# Patient Record
Sex: FEMALE | Race: WHITE | NOT HISPANIC OR LATINO | Employment: FULL TIME | URBAN - METROPOLITAN AREA
[De-identification: names, ages, dates, MRNs, and addresses within clinical notes are randomized per-mention and may not be internally consistent; named-entity substitution may affect disease eponyms.]

---

## 2024-01-19 ENCOUNTER — TELEPHONE (OUTPATIENT)
Dept: NEUROSURGERY | Facility: CLINIC | Age: 54
End: 2024-01-19
Payer: COMMERCIAL

## 2024-01-25 ENCOUNTER — TELEPHONE (OUTPATIENT)
Dept: SPINE | Facility: CLINIC | Age: 54
End: 2024-01-25
Payer: COMMERCIAL

## 2024-01-26 ENCOUNTER — TELEPHONE (OUTPATIENT)
Dept: NEUROSURGERY | Facility: CLINIC | Age: 54
End: 2024-01-26
Payer: COMMERCIAL

## 2024-01-26 ENCOUNTER — PATIENT MESSAGE (OUTPATIENT)
Dept: NEUROSURGERY | Facility: CLINIC | Age: 54
End: 2024-01-26
Payer: COMMERCIAL

## 2024-01-26 DIAGNOSIS — M54.9 DORSALGIA: Primary | ICD-10-CM

## 2024-01-26 RX ORDER — ALPRAZOLAM 0.5 MG/1
0.5 TABLET ORAL 2 TIMES DAILY PRN
COMMUNITY

## 2024-01-26 RX ORDER — ESCITALOPRAM OXALATE 20 MG/1
20 TABLET ORAL DAILY
COMMUNITY

## 2024-01-26 RX ORDER — IBUPROFEN 800 MG/1
800 TABLET ORAL EVERY 8 HOURS
Status: ON HOLD | COMMUNITY
Start: 2023-11-01 | End: 2024-03-22 | Stop reason: HOSPADM

## 2024-01-26 RX ORDER — OXYCODONE HYDROCHLORIDE 5 MG/1
5 TABLET ORAL EVERY 4 HOURS PRN
Status: ON HOLD | COMMUNITY
Start: 2023-11-01 | End: 2024-03-22 | Stop reason: HOSPADM

## 2024-01-26 RX ORDER — AMLODIPINE BESYLATE 2.5 MG/1
TABLET ORAL
COMMUNITY
Start: 2024-01-08

## 2024-01-26 RX ORDER — NALOXONE HYDROCHLORIDE 4 MG/.1ML
4 SPRAY NASAL DAILY PRN
COMMUNITY
Start: 2023-08-15 | End: 2024-08-14

## 2024-01-26 RX ORDER — GABAPENTIN 400 MG/1
400 CAPSULE ORAL 3 TIMES DAILY
COMMUNITY

## 2024-01-26 RX ORDER — ACETAMINOPHEN 500 MG
1000 TABLET ORAL EVERY 8 HOURS PRN
COMMUNITY

## 2024-01-26 RX ORDER — TIRZEPATIDE 2.5 MG/.5ML
2.5 INJECTION, SOLUTION SUBCUTANEOUS WEEKLY
COMMUNITY
Start: 2024-01-12

## 2024-01-26 NOTE — TELEPHONE ENCOUNTER
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 : Yes   Are you on workers comp?No   Are you involved in any ligation at this time? No   Do you have HSA insurance? No                Have support to help with care and appointments: Yes   Travel Caregiver:          What is your chief complaint? lower back and pain down right front  left leg pain- down front all the way down to toes- toes feel like "burning"  right leg- pain in front- down past knee    up walking and moving around- will have sharp pain "shoot down back of both legs" to calf        How long has it been going on? 2020    Have you had pain like this before?No not even when I had original back surgeon    Have you sought treatment for this condition in the past?Yes   If yes, what treatments did you then?  If yes, when did those stop working?    Where is the pain the worst?     Does the pain radiate? down both legs    Where else do you hurt?  headaches    Is the pain constant or does it go away to a 0/10 at times even if the pain comes right back? 5-6- depends on what I'm doing and position    What makes the pain worse? standing or walking    What makes the pain better or decreases the pain (which position is least uncomfortable)? sitting in recliner or lean forward and arch spine    Any other symptoms? numbness and tingling to legs down to feet  weakness to both legs- least 6 months to a year    Any bowel or bladder incontinence? (baseline- constipation)- urgency to urinate- 6-8 months- gotten worse (wear pad daily)  Or changes? yes    What treatments have you tried for your current pain, and did they help?   Physical therapy? yes in 2021- didn't do any good- was irritating-    Chiropractor?   Injections? What kinds - NOT for spine   (has had injections in knees and hips)   Prior back surgery? 2008 lumbar " "fusion    Medication? muscle relaxers, gabapentin       BMI:   Ht: 5'4"  Weight:224 pounds    AIC:    Social Hx:    Tobacco Use: Medium Risk (1/26/2024)    Patient History     Smoking Tobacco Use: Former     Smokeless Tobacco Use: Unknown     Passive Exposure: Not on file     quit smoking on 1/24/24                 Allergies:   Review of patient's allergies indicates:   Allergen Reactions    Adhesive     Adhesive tape-silicones          Medication list:   Current Outpatient Medications on File Prior to Visit   Medication Sig Dispense Refill    acetaminophen (TYLENOL) 500 MG tablet Take 1,000 mg by mouth every 8 (eight) hours as needed for Pain.      ALPRAZolam (XANAX) 0.5 MG tablet Take 0.5 mg by mouth 2 (two) times daily as needed.      amLODIPine (NORVASC) 2.5 MG tablet TAKE ONE TABLET BY MOUTH DAILY - MAY TAKE 2 TABLETS IF BLOOD PRESSURE IS OVER 160      EScitalopram oxalate (LEXAPRO) 20 MG tablet Take 20 mg by mouth.      gabapentin (NEURONTIN) 400 MG capsule Take 400 mg by mouth 3 (three) times daily.      ibuprofen (ADVIL,MOTRIN) 800 MG tablet Take 800 mg by mouth every 8 (eight) hours.      naloxone (NARCAN) 4 mg/actuation Spry 4 mg by Nasal route daily as needed.      oxyCODONE (ROXICODONE) 5 MG immediate release tablet Take 5 mg by mouth every 4 (four) hours as needed. only when in severe pain      tirzepatide (MOUNJARO) 2.5 mg/0.5 mL PnIj Inject 2.5 mg into the skin once a week.       No current facility-administered medications on file prior to visit.         Covid-19:   - vaccine: yes   - diagnosed with: yes      Health Hx:  Past Medical History:   Diagnosis Date    Headache     Hydrocephalus     Neuropathy     Unspecified osteoarthritis, unspecified site          Surgical Hx:   Past Surgical History:   Procedure Laterality Date    APPENDECTOMY      LAMINECTOMY AND MICRODISCECTOMY LUMBAR SPINE  2021    LUMBAR FUSION  2008    TOTAL HIP ARTHROPLASTY Left 10/31/2023    TOTAL KNEE ARTHROPLASTY Bilateral     " VENTRICULOPERITONEAL SHUNT           Can you take one flight of stairs: yes     RCRI:   - Coronary Artery Disease: no   - Congestive Heart Failure: no   - Cerebrovascular Disease: no   - Diabetes Mellitus on insulin: no        STOPBANG   - S  snore loudly yes   - T  tired during the day yes   - O  stop breathing in sleep no   - P  BP/HTN yes   - B  BMI    - A  over 50 Yes   - N  neck size   - G  male gender No    Images Received:  MRI Yes    Type:  Xray No   Type:  EMG  No       Surgery recommended: Yes with Dr. Nicky Hubbard L3-4 lami resection of R. L3-4 facet cyst and extension fusion to L3    PCP Information:   Flavia Redd NP  ph [phone]  fax [phone]      Spoke with pt, reviewed history, discussed timeframe and expectation regarding the [facility] program, discussed logging in to the [facility] portal and to expect link to complete online seminar, confirmed PCP. Explained I would be sending imaging, chart review and spine screen assessment to spine surgeon, then to Dr. Juan to see if any additional orders are needed other than CXR and non bundled labs and I would reach back out when this information if obtained.  Explained that initial trip is an evaluation visit and if surgery needed and determined a surgery date would be determined with surgeon and patient. From there, I would send preop orders to PCP for them to complete at home.       Discussed surgery requiremernts of BMi less than 40, AIC less than 8 and smoking cessation 6 weeks prior to surgery with a required cotine test 2 weeks prior to surgery. Patient verbalized understanding.      Patient verbalized understanding of the above and instructed to reach out with any questions or concerns. Direct phone # given and explained the use of patient portal communication.    discussed initial evaluation dates- patient would like 2/18-2/21/24. Will submit POC to [employer].    Ashley Araiza, RN, CMSRN  RN Navigator  [facility]   [phone]  Fax [phone]

## 2024-02-16 NOTE — PROGRESS NOTES
Subjective:     Patient ID: Ina Blair is a 53 y.o. female.    Chief Complaint: Low-back Pain    Ms Blair is a 52 yo female [facility] patient here for evaluation of low back pain.  She has had the pain for the past couple of years.  She has pain across the back and down the backs of both legs.  She cannot pick which one is worse.  The pain is constant.  The pain is worse with walking and standing.  The pain is better sitting reclined.  She has had 3 back surgeries, 2008, 2020, and 2021.  The pain improved for period after fusion in 2008, and less after surgery 2020, and 2021.  The pain is all words.  The pain is  down the buttock and down the back of the leg to the knee.  She feels like left toes burn.  She has pain in front of thighs with sitting, but most of the pain is down the back of the leg.  Pain is 6/10 now, worst 10/10 toward the end of the day and moving a lot at home, best 1/10 with tylenol and lying down or reclining.  She has tried PT in the past in 2021 and feels like it made it worse.  She has not had any injections, because they told her she would not be able to get surgery.  She then had surgery scheduled and came here.  She has been going to chiropractor and massage with some relief.      MRI lumbar 10/2023  Tubes, catheters and devices: The patient is post L4  laminectomy, discectomy with interbody spacer device at  L4-L5 and posterior fixation via bilateral pedicle screws  with ipsilateral screws joined by vertical bars. Surgical  clips are noted in the posterior subcutaneous tissues.  Indwelling metallic surgical hardware causes local field  distortion and susceptibility artifacts.  Bones/joints: Lumbar spondylosis is noted with disc  bulging, facet arthropathy and ligamentous thickening,  worst at L3-L4.  Spinal cord: Visualized cord, conus medullaris and cauda  equina are unremarkable without compression.  L1-L2: No significant disc bulge or herniation. No severe  spinal canal  stenosis. No significant neural foraminal  narrowing.  L2-L3: At L2-L3 there is a broad-based disc bulge with  facet arthropathy and ligamentous thickening causing mild  canal narrowing and moderate neural foraminal stenosis.  L3-L4: At L3-L4 there is a 3 mm grade 1 anterior  spondylolisthesis and broad-based disc bulge with facet  arthropathy and ligamentous thickening and a right 12 x 8 x  6 mm cyst or collection in the right side of the canal  likely representing a synovial cyst. Findings conspire to  cause severe canal narrowing with near closure of the  thecal sac pinching the nerve roots. There is severe neural  foraminal narrowing.  L4-L5: At L4-L5 there is no significant canal narrowing and  moderate right neural foraminal stenosis. The left neural  foramen is obscured.  L5-51: At L5-S1 there is no significant canal narrowing and  mild neural foraminal stenosis.  X-ray hip 8/2023  No fracture or dislocation. Normal alignment. Significant osteoarthritis of the left hip. Mild osteoarthritis of the right hip. Partially visualized lumbar fusion hardware. Soft tissues are unremarkable.      Impression:    Osteoarthritis of the hips, left greater than right.     X-ray lumbar 2/19/2023  Prior posterior and interbody fusion L4-5.  Hardware is intact.     Lumbar vertebral body heights are maintained.     Disc space narrowing and endplate changes L3-4.  Facet arthropathy at this level.     Grade 1 anterolisthesis L3-4 with no significant change with flexion or extension.     Impression:     Degenerative change L3-4 with grade 1 anterolisthesis.        Past Medical History:  No date: Headache  No date: Hydrocephalus  No date: Neuropathy  No date: Unspecified osteoarthritis, unspecified site    Past Surgical History:  No date: APPENDECTOMY  2021: LAMINECTOMY AND MICRODISCECTOMY LUMBAR SPINE  2008: LUMBAR FUSION  10/31/2023: TOTAL HIP ARTHROPLASTY; Left  No date: TOTAL KNEE ARTHROPLASTY; Bilateral  No date:  VENTRICULOPERITONEAL SHUNT    No family history on file.      Social History    Socioeconomic History      Marital status:     Tobacco Use      Smoking status: Former        Types: Cigarettes      Current Outpatient Medications:  acetaminophen (TYLENOL) 500 MG tablet, Take 1,000 mg by mouth every 8 (eight) hours as needed for Pain., Disp: , Rfl:   ALPRAZolam (XANAX) 0.5 MG tablet, Take 0.5 mg by mouth 2 (two) times daily as needed., Disp: , Rfl:   amLODIPine (NORVASC) 2.5 MG tablet, TAKE ONE TABLET BY MOUTH DAILY - MAY TAKE 2 TABLETS IF BLOOD PRESSURE IS OVER 160, Disp: , Rfl:   EScitalopram oxalate (LEXAPRO) 20 MG tablet, Take 20 mg by mouth., Disp: , Rfl:   gabapentin (NEURONTIN) 400 MG capsule, Take 400 mg by mouth 3 (three) times daily., Disp: , Rfl:   ibuprofen (ADVIL,MOTRIN) 800 MG tablet, Take 800 mg by mouth every 8 (eight) hours., Disp: , Rfl:   losartan-hydrochlorothiazide 50-12.5 mg (HYZAAR) 50-12.5 mg per tablet, Take 1 tablet by mouth once daily., Disp: , Rfl:   naloxone (NARCAN) 4 mg/actuation Spry, 4 mg by Nasal route daily as needed., Disp: , Rfl:   oxyCODONE (ROXICODONE) 5 MG immediate release tablet, Take 5 mg by mouth every 4 (four) hours as needed. only when in severe pain, Disp: , Rfl:   tirzepatide (MOUNJARO) 2.5 mg/0.5 mL PnIj, Inject 2.5 mg into the skin once a week., Disp: , Rfl:     No current facility-administered medications for this visit.      Review of patient's allergies indicates:   -- Adhesive    -- Adhesive tape-silicones         Review of Systems   Constitutional: Negative for weight gain and weight loss.   Cardiovascular:  Positive for chest pain.   Respiratory:  Negative for shortness of breath.    Musculoskeletal:  Positive for back pain and neck pain. Negative for joint pain and joint swelling.   Gastrointestinal:  Negative for abdominal pain, bowel incontinence, nausea and vomiting.   Genitourinary:  Negative for bladder incontinence.   Neurological:  Positive for  headaches, numbness (front of thighs) and paresthesias (hands).        Objective:     General: Ina is well-developed, well-nourished, appears stated age, in no acute distress, alert and oriented to time, place and person.     General    Vitals reviewed.  Constitutional: She is oriented to person, place, and time. She appears well-developed and well-nourished.   HENT:   Head: Normocephalic and atraumatic.   Pulmonary/Chest: Effort normal.   Neurological: She is alert and oriented to person, place, and time.   Psychiatric: She has a normal mood and affect. Her behavior is normal. Judgment and thought content normal.     General Musculoskeletal Exam   Gait: abnormal     Right Ankle/Foot Exam     Tests   Heel Walk: able to perform  Tiptoe Walk: able to perform    Left Ankle/Foot Exam     Tests   Heel Walk: able to perform  Tiptoe Walk: able to perform  Back (L-Spine & T-Spine) / Neck (C-Spine) Exam     Tenderness Right paramedian tenderness of the Upper L-Spine. Left paramedian tenderness of the Upper L-Spine.     Back (L-Spine & T-Spine) Range of Motion   Extension:  10   Flexion:  80   Lateral bend right:  20   Lateral bend left:  20   Rotation right:  30   Rotation left:  30     Spinal Sensation   Right Side Sensation  C-Spine Level: normal   L-Spine Level: normal  S-Spine Level: normal  Left Side Sensation  C-Spine Level: normal  L-Spine Level: normal  S-Spine Level: normal    Back (L-Spine & T-Spine) Tests   Right Side Tests  Straight leg raise:        Sitting SLR: > 70 degrees    Left Side Tests  Straight leg raise:       Sitting SLR: > 70 degrees      Other   She has no scoliosis .  Spinal Kyphosis:  Absent      Muscle Strength   Right Upper Extremity   Biceps: 5/5   Deltoid:  5/5  Triceps:  5/5  Wrist extension: 5/5   Finger Flexors:  5/5  Left Upper Extremity  Biceps: 5/5   Deltoid:  5/5  Triceps:  5/5  Wrist extension: 5/5   Finger Flexors:  5/5  Right Lower Extremity   Hip Flexion: 5/5   Quadriceps:  5/5    Anterior tibial:  5/5   EHL:  5/5  Left Lower Extremity   Hip Flexion: 5/5   Quadriceps:  5/5   Anterior tibial:  5/5   EHL:  5/5    Reflexes     Left Side  Biceps:  2+  Triceps:  2+  Brachioradialis:  2+  Achilles:  2+  Left Manning's Sign:  Absent  Babinski Sign:  absent  Quadriceps:  2+    Right Side   Biceps:  2+  Triceps:  2+  Brachioradialis:  2+  Achilles:  3+  Right Manning's Sign:  present  Babinski Sign:  present  Ankle Clonus:  present  Quadriceps:  3+    Vascular Exam     Right Pulses        Carotid:                  2+    Left Pulses        Carotid:                  2+          Assessment:     1. Osteoarthritis of spine with radiculopathy, lumbar region         Plan:     Orders Placed This Encounter    Ambulatory referral/consult to [facility]     We discussed back pain and the nature of back pain.  We discussed that it is not one thing that causes the pain but an accumulation of multiple things that we do.  She does have a synovial cyst, but her pain is bilateral and not right side dominant.  It does seem more facet mediated.  The pain does not go past the knees.  MRI lumbar was reviewed  She does have manning and increased reflexes on right and clonus.  We discussed might need to look at neck. X-ray of cervical spine was reviewed.  She does have history of  shunt  We discussed posture sitting and the importance of trying to sit better.  We discussed posture sitting.  She feels best reclined.  This might be more due to hips  We discussed the benefits of therapy and exercise and continuing to move.  We discussed PT for back and core strengthening, and treat your own back by Rex Mayer  Healthy back pattern 2        Follow-up: No follow-ups on file. If there are any questions prior to this, the patient was instructed to contact the office.

## 2024-02-19 ENCOUNTER — HOSPITAL ENCOUNTER (OUTPATIENT)
Dept: RADIOLOGY | Facility: OTHER | Age: 54
Discharge: HOME | End: 2024-02-19
Attending: NEUROLOGICAL SURGERY
Payer: COMMERCIAL

## 2024-02-19 ENCOUNTER — TELEPHONE (OUTPATIENT)
Dept: SPINE | Facility: CLINIC | Age: 54
End: 2024-02-19

## 2024-02-19 ENCOUNTER — TELEPHONE (OUTPATIENT)
Dept: INTERNAL MEDICINE | Facility: CLINIC | Age: 54
End: 2024-02-19
Payer: COMMERCIAL

## 2024-02-19 ENCOUNTER — HOSPITAL ENCOUNTER (OUTPATIENT)
Dept: RADIOLOGY | Facility: OTHER | Age: 54
Discharge: HOME | End: 2024-02-19
Attending: STUDENT IN AN ORGANIZED HEALTH CARE EDUCATION/TRAINING PROGRAM
Payer: COMMERCIAL

## 2024-02-19 ENCOUNTER — OFFICE VISIT (OUTPATIENT)
Dept: SPINE | Facility: CLINIC | Age: 54
End: 2024-02-19
Payer: COMMERCIAL

## 2024-02-19 ENCOUNTER — OFFICE VISIT (OUTPATIENT)
Dept: SPINE | Facility: CLINIC | Age: 54
End: 2024-02-19
Attending: PHYSICAL MEDICINE & REHABILITATION
Payer: COMMERCIAL

## 2024-02-19 VITALS
DIASTOLIC BLOOD PRESSURE: 60 MMHG | WEIGHT: 224 LBS | HEART RATE: 60 BPM | TEMPERATURE: 98 F | WEIGHT: 224 LBS | RESPIRATION RATE: 20 BRPM | BODY MASS INDEX: 38.24 KG/M2 | DIASTOLIC BLOOD PRESSURE: 60 MMHG | SYSTOLIC BLOOD PRESSURE: 111 MMHG | DIASTOLIC BLOOD PRESSURE: 60 MMHG | BODY MASS INDEX: 38.24 KG/M2 | HEIGHT: 64 IN | SYSTOLIC BLOOD PRESSURE: 111 MMHG | SYSTOLIC BLOOD PRESSURE: 111 MMHG | HEART RATE: 60 BPM | HEIGHT: 64 IN

## 2024-02-19 DIAGNOSIS — M43.26 FUSION OF SPINE, LUMBAR REGION: ICD-10-CM

## 2024-02-19 DIAGNOSIS — Q01.9 HYDROCEPHALOCELE: ICD-10-CM

## 2024-02-19 DIAGNOSIS — M54.9 DORSALGIA: ICD-10-CM

## 2024-02-19 DIAGNOSIS — M54.16 LUMBAR RADICULOPATHY: Primary | ICD-10-CM

## 2024-02-19 DIAGNOSIS — G95.9 CERVICAL MYELOPATHY: ICD-10-CM

## 2024-02-19 DIAGNOSIS — M54.16 LUMBAR RADICULOPATHY: ICD-10-CM

## 2024-02-19 DIAGNOSIS — G95.9 CERVICAL MYELOPATHY: Primary | ICD-10-CM

## 2024-02-19 DIAGNOSIS — M47.26 OSTEOARTHRITIS OF SPINE WITH RADICULOPATHY, LUMBAR REGION: Primary | ICD-10-CM

## 2024-02-19 PROBLEM — I10 PRIMARY HYPERTENSION: Status: ACTIVE | Noted: 2023-10-26

## 2024-02-19 PROBLEM — M17.12 OSTEOARTHRITIS OF LEFT KNEE: Status: ACTIVE | Noted: 2024-02-12

## 2024-02-19 PROBLEM — M16.12 OSTEOARTHRITIS OF LEFT HIP: Status: ACTIVE | Noted: 2020-09-04

## 2024-02-19 PROCEDURE — 72141 MRI NECK SPINE W/O DYE: CPT | Mod: 26,COE,, | Performed by: RADIOLOGY

## 2024-02-19 PROCEDURE — 72082 X-RAY EXAM ENTIRE SPI 2/3 VW: CPT | Mod: 26,COE,, | Performed by: RADIOLOGY

## 2024-02-19 PROCEDURE — 99999 PR PBB SHADOW E&M-EST. PATIENT-LVL III: CPT | Mod: PBBFAC,COE,, | Performed by: PHYSICAL MEDICINE & REHABILITATION

## 2024-02-19 PROCEDURE — 99499 UNLISTED E&M SERVICE: CPT | Mod: S$GLB,COE,, | Performed by: ANESTHESIOLOGY

## 2024-02-19 PROCEDURE — 70260 X-RAY EXAM OF SKULL: CPT | Mod: 26,COE,, | Performed by: RADIOLOGY

## 2024-02-19 PROCEDURE — 72141 MRI NECK SPINE W/O DYE: CPT | Mod: TC

## 2024-02-19 PROCEDURE — 72114 X-RAY EXAM L-S SPINE BENDING: CPT | Mod: TC,FY

## 2024-02-19 PROCEDURE — 72050 X-RAY EXAM NECK SPINE 4/5VWS: CPT | Mod: TC,FY

## 2024-02-19 PROCEDURE — 70260 X-RAY EXAM OF SKULL: CPT | Mod: TC,FY

## 2024-02-19 PROCEDURE — 99203 OFFICE O/P NEW LOW 30 MIN: CPT | Mod: S$GLB,COE,, | Performed by: NEUROLOGICAL SURGERY

## 2024-02-19 PROCEDURE — 99999 PR PBB SHADOW E&M-EST. PATIENT-LVL IV: CPT | Mod: PBBFAC,COE,, | Performed by: NEUROLOGICAL SURGERY

## 2024-02-19 PROCEDURE — 72082 X-RAY EXAM ENTIRE SPI 2/3 VW: CPT | Mod: TC,FY

## 2024-02-19 PROCEDURE — 72050 X-RAY EXAM NECK SPINE 4/5VWS: CPT | Mod: 26,COE,, | Performed by: RADIOLOGY

## 2024-02-19 PROCEDURE — 72131 CT LUMBAR SPINE W/O DYE: CPT | Mod: 26,COE,, | Performed by: RADIOLOGY

## 2024-02-19 PROCEDURE — 99204 OFFICE O/P NEW MOD 45 MIN: CPT | Mod: S$GLB,COE,, | Performed by: PHYSICAL MEDICINE & REHABILITATION

## 2024-02-19 PROCEDURE — 72114 X-RAY EXAM L-S SPINE BENDING: CPT | Mod: 26,COE,, | Performed by: RADIOLOGY

## 2024-02-19 PROCEDURE — 72131 CT LUMBAR SPINE W/O DYE: CPT | Mod: TC

## 2024-02-19 RX ORDER — LOSARTAN POTASSIUM AND HYDROCHLOROTHIAZIDE 12.5; 5 MG/1; MG/1
1 TABLET ORAL DAILY
COMMUNITY

## 2024-02-19 NOTE — PROGRESS NOTES
Dear Quentin Kimble MD,    Thank you for referring this patient to my clinic. The full details of my evaluation will also be forthcoming to you by letter.    CHIEF COMPLAINT:  Low back pain      HPI:  Ina Blair is a 53 y.o.  female with history of hydrocephalus status post shunt and prior L4-5 TLIF who presented with low back pain as well as bilateral radicular thigh pain which progressively getting worse.  Back pain is aggravated with standing sitting or walking.  Pain got better when she sat down and rested.  She can not walk more than 1 block without pain.  Leaning forward help with pain.  Pain radiates to the anterior aspect of both thighs.  At the knees.  She also reports some pain that radiates to the buttock to the posterior aspect of her thighs especially when she walks for long distances.  She reports some urinary incontinence.  Upon further questioning she also reports some difficulty ambulating lately with unsteady gait as well as she has been dropping some objects.  She failed conservative management including multimodal pain medications, injections, and physical therapy.      Review of patient's allergies indicates:   Allergen Reactions    Adhesive     Adhesive tape-silicones        Past Medical History:   Diagnosis Date    Headache     Hydrocephalus     Neuropathy     Unspecified osteoarthritis, unspecified site      Past Surgical History:   Procedure Laterality Date    APPENDECTOMY      LAMINECTOMY AND MICRODISCECTOMY LUMBAR SPINE  2021    LUMBAR FUSION  2008    TOTAL HIP ARTHROPLASTY Left 10/31/2023    TOTAL KNEE ARTHROPLASTY Bilateral     VENTRICULOPERITONEAL SHUNT       No family history on file.  Social History     Tobacco Use    Smoking status: Former     Types: Cigarettes        Review of Systems    OBJECTIVE:     Vital Signs:  Pulse: 60 (02/19/24 0940)  BP: 111/60 (02/19/24 0940)    Physical Exam:    Vital signs: All nursing notes and vital signs reviewed -- afebrile, vital  signs stable.  Constitutional: Patient sitting comfortably in chair. Appears well developed and well nourished.  Skin: Exposed areas are intact without abnormal markings, rashes or other lesions.  HEENT: Normocephalic. Normal conjunctivae.  Cardiovascular: Normal rate and regular rhythm.  Respiratory: Chest wall rises and falls symmetrically, without signs of respiratory distress.  Abdomen: Soft and non-tender.  Extremities: Warm and without edema. Calves supple, non-tender.  Psych/Behavior: Normal affect.    Neurological:    Mental status: Alert and oriented. Conversational and appropriate.       Cranial Nerves: VFF to confrontation. PERRL. EOMI without nystagmus. Facial STLT normal and symmetric. Strong, symmetric muscles of mastication. Facial strength full and symmetric. Hearing equal bilaterally to finger rub. Palate and uvula rise and fall normally in midline. Shoulder shrug 5/5 strength. Tongue midline.     Motor:    Upper:  Deltoids Triceps Biceps WE WF     R 5/5 5/5 5/5 5/5 4/5 4/5    L 5/5 5/5 5/5 5/5 4/5 4/5      Lower:  HF KE KF DF PF EHL    R 4/5 5/5 5/5 5/5 5/5 5/5    L 4/5 5/5 5/5 5/5 5/5 5/5     Sensory: Intact sensation to light touch in all extremities. Romberg negative.    Reflexes:          DTR: 2+ symmetrically throughout.     Abdominal reflexes: Normal, symmetric.     Manning's:  Positive.     Babinski's: Negative.     Clonus: Negative.    Cerebellar: Finger-to-nose and rapid alternating movements normal. Gait stable, fluid.    Spine:    Posture:  Unable to perform tandem gait    Bending: Full ROM with forward, back and lateral bending. No rib prominence with forward bend.    Cervical:      ROM: Full with flexion, extension, lateral rotation and ear-to-shoulder bend.      Midline TTP: Negative.     Spurling's test: Negative.     Lhermitte's: Negative.    Thoracic:     Midline TTP: Negative    Lumbar:     Midline TTP: Negative     Straight Leg Test: Negative     Crossed Straight Leg Test:  Negative     Sciatic notch tenderness: Negative.    Other:     SI joint TTP: Negative.     Greater trochanter TTP: Negative.     Tenderness with external/internal hip rotation: Negative.    Diagnostic Results:  All imaging was independently reviewed by me.      MRI lumbar spine , dated 10/17/2023  1. L3-4 adjacent level disease with moderate to severe stenosis, right-sided synovial cyst and facet arthropathy with facet and signal changes    CT lumbar spine, dated 01/10/2024:  1. L4-5 hardware in position with good posterolateral and interbody fusion      Flex/Ex X-ray lumbar spine, dated 02/19/2024:  1. Negative for abnormal movement    Scoliosis standing AP and Lateral X-ray, dated 02/19/2024:  1. Sagittally and coronally balanced      ASSESSMENT/PLAN:     Ina Blair is a 53-year-old female with hydrocephalus status post  shunt and lumbar stenosis with lumbar radiculopathy status post L4-5 TLIF who presented with low back pain and bilateral L3 radiculopathy right-sided more than left.  Imaging shows adjacent level disease at L3-4.  Clinically she is found to be myelopathic on exam.  No cervical imaging to review.  She has Codman Hakim set at 100.    The patient understands and agrees with the plan of care. All questions were answered.     1. Cervical MRI for further workup  2. Skull x-ray to assess shunt setting after MRI  3. Under consideration L3-L5 fusion pending C-spine MRI  4. Of cervical MRI concerning for cervical pathology we will address her neck before addressing her lumbar spine  5. Final dispo pending C-spine MRI          Quentin Meek M.D.  Department of Neurosurgery  [facility]      .   [place] [place]

## 2024-02-19 NOTE — TELEPHONE ENCOUNTER
Patient met with Dr. knox, lumbar fusion revision discussed, patient signed consents and surgery date of 3/20/24 chosen for patient. Will obtain cervical MRI, xrays and Lumbar CT as ordered for future planning. Updated POC sent to [facility].

## 2024-02-19 NOTE — PROGRESS NOTES
Follow up note:    C-spine MRI obtained which shows subaxial degenerative disc disease C3 down to C6 with cervical stenosis and myelomalacia.  Shunt x-ray with stable setting.          Plan:  She would benefit from C3-C6 posterior cervical decompression and fusion   We will obtain C-spine flexion-extension x-ray  We will see her again in clinic in 2 weeks after surgery   We will address lumbar spine and under consideration L3-L5 revision extension fusion after she recovered from her cervical spine      Xavier Melo MD  NSGY

## 2024-02-19 NOTE — H&P (VIEW-ONLY)
Dear Quentin Kimble MD,    Thank you for referring this patient to my clinic. The full details of my evaluation will also be forthcoming to you by letter.    CHIEF COMPLAINT:  Low back pain      HPI:  Ina Blair is a 53 y.o.  female with history of hydrocephalus status post shunt and prior L4-5 TLIF who presented with low back pain as well as bilateral radicular thigh pain which progressively getting worse.  Back pain is aggravated with standing sitting or walking.  Pain got better when she sat down and rested.  She can not walk more than 1 block without pain.  Leaning forward help with pain.  Pain radiates to the anterior aspect of both thighs.  At the knees.  She also reports some pain that radiates to the buttock to the posterior aspect of her thighs especially when she walks for long distances.  She reports some urinary incontinence.  Upon further questioning she also reports some difficulty ambulating lately with unsteady gait as well as she has been dropping some objects.  She failed conservative management including multimodal pain medications, injections, and physical therapy.      Review of patient's allergies indicates:   Allergen Reactions    Adhesive     Adhesive tape-silicones        Past Medical History:   Diagnosis Date    Headache     Hydrocephalus     Neuropathy     Unspecified osteoarthritis, unspecified site      Past Surgical History:   Procedure Laterality Date    APPENDECTOMY      LAMINECTOMY AND MICRODISCECTOMY LUMBAR SPINE  2021    LUMBAR FUSION  2008    TOTAL HIP ARTHROPLASTY Left 10/31/2023    TOTAL KNEE ARTHROPLASTY Bilateral     VENTRICULOPERITONEAL SHUNT       No family history on file.  Social History     Tobacco Use    Smoking status: Former     Types: Cigarettes        Review of Systems    OBJECTIVE:     Vital Signs:  Pulse: 60 (02/19/24 0940)  BP: 111/60 (02/19/24 0940)    Physical Exam:    Vital signs: All nursing notes and vital signs reviewed -- afebrile, vital  signs stable.  Constitutional: Patient sitting comfortably in chair. Appears well developed and well nourished.  Skin: Exposed areas are intact without abnormal markings, rashes or other lesions.  HEENT: Normocephalic. Normal conjunctivae.  Cardiovascular: Normal rate and regular rhythm.  Respiratory: Chest wall rises and falls symmetrically, without signs of respiratory distress.  Abdomen: Soft and non-tender.  Extremities: Warm and without edema. Calves supple, non-tender.  Psych/Behavior: Normal affect.    Neurological:    Mental status: Alert and oriented. Conversational and appropriate.       Cranial Nerves: VFF to confrontation. PERRL. EOMI without nystagmus. Facial STLT normal and symmetric. Strong, symmetric muscles of mastication. Facial strength full and symmetric. Hearing equal bilaterally to finger rub. Palate and uvula rise and fall normally in midline. Shoulder shrug 5/5 strength. Tongue midline.     Motor:    Upper:  Deltoids Triceps Biceps WE WF     R 5/5 5/5 5/5 5/5 4/5 4/5    L 5/5 5/5 5/5 5/5 4/5 4/5      Lower:  HF KE KF DF PF EHL    R 4/5 5/5 5/5 5/5 5/5 5/5    L 4/5 5/5 5/5 5/5 5/5 5/5     Sensory: Intact sensation to light touch in all extremities. Romberg negative.    Reflexes:          DTR: 2+ symmetrically throughout.     Abdominal reflexes: Normal, symmetric.     Manning's:  Positive.     Babinski's: Negative.     Clonus: Negative.    Cerebellar: Finger-to-nose and rapid alternating movements normal. Gait stable, fluid.    Spine:    Posture:  Unable to perform tandem gait    Bending: Full ROM with forward, back and lateral bending. No rib prominence with forward bend.    Cervical:      ROM: Full with flexion, extension, lateral rotation and ear-to-shoulder bend.      Midline TTP: Negative.     Spurling's test: Negative.     Lhermitte's: Negative.    Thoracic:     Midline TTP: Negative    Lumbar:     Midline TTP: Negative     Straight Leg Test: Negative     Crossed Straight Leg Test:  Negative     Sciatic notch tenderness: Negative.    Other:     SI joint TTP: Negative.     Greater trochanter TTP: Negative.     Tenderness with external/internal hip rotation: Negative.    Diagnostic Results:  All imaging was independently reviewed by me.      MRI lumbar spine , dated 10/17/2023  1. L3-4 adjacent level disease with moderate to severe stenosis, right-sided synovial cyst and facet arthropathy with facet and signal changes    CT lumbar spine, dated 01/10/2024:  1. L4-5 hardware in position with good posterolateral and interbody fusion      Flex/Ex X-ray lumbar spine, dated 02/19/2024:  1. Negative for abnormal movement    Scoliosis standing AP and Lateral X-ray, dated 02/19/2024:  1. Sagittally and coronally balanced      ASSESSMENT/PLAN:     Ina Blair is a 53-year-old female with hydrocephalus status post  shunt and lumbar stenosis with lumbar radiculopathy status post L4-5 TLIF who presented with low back pain and bilateral L3 radiculopathy right-sided more than left.  Imaging shows adjacent level disease at L3-4.  Clinically she is found to be myelopathic on exam.  No cervical imaging to review.  She has Codman Hakim set at 100.    The patient understands and agrees with the plan of care. All questions were answered.     1. Cervical MRI for further workup  2. Skull x-ray to assess shunt setting after MRI  3. Under consideration L3-L5 fusion pending C-spine MRI  4. Of cervical MRI concerning for cervical pathology we will address her neck before addressing her lumbar spine  5. Final dispo pending C-spine MRI          Quentin Meek M.D.  Department of Neurosurgery  [facility]      .   [place]

## 2024-02-20 ENCOUNTER — HOSPITAL ENCOUNTER (OUTPATIENT)
Dept: CARDIOLOGY | Facility: CLINIC | Age: 54
Discharge: HOME | End: 2024-02-20
Payer: COMMERCIAL

## 2024-02-20 ENCOUNTER — HOSPITAL ENCOUNTER (OUTPATIENT)
Dept: RADIOLOGY | Facility: HOSPITAL | Age: 54
Discharge: HOME | End: 2024-02-20
Attending: STUDENT IN AN ORGANIZED HEALTH CARE EDUCATION/TRAINING PROGRAM
Payer: COMMERCIAL

## 2024-02-20 ENCOUNTER — TELEPHONE (OUTPATIENT)
Dept: NEUROSURGERY | Facility: CLINIC | Age: 54
End: 2024-02-20
Payer: COMMERCIAL

## 2024-02-20 ENCOUNTER — OFFICE VISIT (OUTPATIENT)
Dept: INTERNAL MEDICINE | Facility: CLINIC | Age: 54
End: 2024-02-20
Payer: COMMERCIAL

## 2024-02-20 VITALS
WEIGHT: 224.88 LBS | SYSTOLIC BLOOD PRESSURE: 123 MMHG | HEIGHT: 64 IN | OXYGEN SATURATION: 96 % | BODY MASS INDEX: 38.39 KG/M2 | HEART RATE: 47 BPM | TEMPERATURE: 98 F | DIASTOLIC BLOOD PRESSURE: 69 MMHG

## 2024-02-20 DIAGNOSIS — R00.1 BRADYCARDIA: ICD-10-CM

## 2024-02-20 DIAGNOSIS — I10 PRIMARY HYPERTENSION: ICD-10-CM

## 2024-02-20 DIAGNOSIS — M43.26 FUSION OF SPINE, LUMBAR REGION: ICD-10-CM

## 2024-02-20 DIAGNOSIS — M16.12 OSTEOARTHRITIS OF LEFT HIP, UNSPECIFIED OSTEOARTHRITIS TYPE: ICD-10-CM

## 2024-02-20 DIAGNOSIS — F43.9 STRESS AT HOME: ICD-10-CM

## 2024-02-20 DIAGNOSIS — G95.9 CERVICAL MYELOPATHY: ICD-10-CM

## 2024-02-20 DIAGNOSIS — E04.9 ENLARGED THYROID: ICD-10-CM

## 2024-02-20 DIAGNOSIS — I10 PRIMARY HYPERTENSION: Primary | ICD-10-CM

## 2024-02-20 DIAGNOSIS — E66.9 OBESITY, UNSPECIFIED CLASSIFICATION, UNSPECIFIED OBESITY TYPE, UNSPECIFIED WHETHER SERIOUS COMORBIDITY PRESENT: ICD-10-CM

## 2024-02-20 PROBLEM — Z96.651 PRESENCE OF RIGHT ARTIFICIAL KNEE JOINT: Status: ACTIVE | Noted: 2020-12-30

## 2024-02-20 PROBLEM — M54.50 LOW BACK PAIN: Status: ACTIVE | Noted: 2024-02-20

## 2024-02-20 PROBLEM — Z79.1 LONG TERM (CURRENT) USE OF NON-STEROIDAL ANTI-INFLAMMATORIES (NSAID): Status: ACTIVE | Noted: 2020-06-16

## 2024-02-20 LAB
OHS QRS DURATION: 100 MS
OHS QTC CALCULATION: 441 MS

## 2024-02-20 PROCEDURE — 99999 PR PBB SHADOW E&M-EST. PATIENT-LVL IV: CPT | Mod: PBBFAC,COE,, | Performed by: NURSE PRACTITIONER

## 2024-02-20 PROCEDURE — 99205 OFFICE O/P NEW HI 60 MIN: CPT | Mod: S$GLB,COE,, | Performed by: NURSE PRACTITIONER

## 2024-02-20 PROCEDURE — 72040 X-RAY EXAM NECK SPINE 2-3 VW: CPT | Mod: 26,COE,, | Performed by: RADIOLOGY

## 2024-02-20 PROCEDURE — 93005 ELECTROCARDIOGRAM TRACING: CPT | Mod: S$GLB,COE,, | Performed by: NURSE PRACTITIONER

## 2024-02-20 PROCEDURE — 72040 X-RAY EXAM NECK SPINE 2-3 VW: CPT | Mod: TC

## 2024-02-20 PROCEDURE — 93010 ELECTROCARDIOGRAM REPORT: CPT | Mod: S$GLB,COE,, | Performed by: INTERNAL MEDICINE

## 2024-02-20 RX ORDER — AMOXICILLIN AND CLAVULANATE POTASSIUM 875; 125 MG/1; MG/1
1 TABLET, FILM COATED ORAL
COMMUNITY
Start: 2024-02-12 | End: 2024-02-22

## 2024-02-20 NOTE — OUTPATIENT SUBJECTIVE & OBJECTIVE
Outpatient Subjective & Objective      Chief Complaint: Preoperative evaulation, perioperative medical management, and complication reduction plan.     Functional Capacity:  Able to climb a flight of stairs without CP SOB or Syncope.  Able to meet 4 METs      Anesthesia issues: None    Difficulty mouth opening: n    Steroid use in the last 12 months:  n    Dental Issues:missing    Family anesthesia difficulty: None     Family Hx of Thrombosis  mother with childdbirth    Past Medical History:   Diagnosis Date    Enlarged thyroid 2/20/2024    Headache     Hydrocephalus     Hypertension     Neuropathy     Unspecified osteoarthritis, unspecified site      Past Surgical History:   Procedure Laterality Date    APPENDECTOMY      bladder stretch      CARPAL TUNNEL RELEASE Bilateral     LAMINECTOMY AND MICRODISCECTOMY LUMBAR SPINE  2021    LUMBAR FUSION  2008    TOTAL HIP ARTHROPLASTY Left 10/31/2023    TOTAL KNEE ARTHROPLASTY Bilateral     VENTRICULOPERITONEAL SHUNT         Review of Systems   Constitutional:  Positive for unexpected weight change. Negative for chills, fatigue and fever.   HENT:  Positive for trouble swallowing. Negative for voice change.    Eyes:  Negative for photophobia and visual disturbance.        No acute visual changes   Respiratory:  Negative for apnea, cough, chest tightness, shortness of breath and wheezing.         STOP bang  Score  4    High risk MERA   Cardiovascular:  Negative for chest pain, palpitations and leg swelling.        Low HR   Gastrointestinal:  Positive for constipation. Negative for abdominal distention, abdominal pain, blood in stool, diarrhea, nausea and vomiting.        NO FLD, hepatitis, cirrhosis  No BRB or black tarry stool     Genitourinary:  Negative for difficulty urinating, dysuria, flank pain, frequency, hematuria and urgency.        Stress incontinence   Musculoskeletal:  Positive for back pain. Negative for arthralgias, gait problem, joint swelling, myalgias, neck  "pain and neck stiffness.   Neurological:  Positive for tremors, numbness and headaches. Negative for dizziness, seizures, syncope, weakness and light-headedness.   Psychiatric/Behavioral:  Negative for suicidal ideas.       VITALS  Visit Vitals  /69 (BP Location: Left arm, Patient Position: Sitting)   Pulse (!) 47   Temp 98.1 °F (36.7 °C) (Oral)   Ht 5' 4" (1.626 m)   Wt 102 kg (224 lb 13.9 oz)   SpO2 96%   BMI 38.60 kg/m²          Physical Exam  Constitutional:       General: She is not in acute distress.     Appearance: Normal appearance. She is well-developed. She is not diaphoretic.   HENT:      Head: Normocephalic.      Right Ear: Hearing normal.      Left Ear: Hearing normal.      Nose: Nose normal.      Mouth/Throat:      Lips: Pink.      Mouth: Mucous membranes are moist.      Pharynx: No oropharyngeal exudate.   Eyes:      General: Lids are normal.         Right eye: No discharge.         Left eye: No discharge.      Conjunctiva/sclera: Conjunctivae normal.      Pupils: Pupils are equal, round, and reactive to light.   Neck:      Thyroid: No thyromegaly.      Vascular: No carotid bruit or JVD.      Trachea: Trachea and phonation normal. No tracheal deviation.   Cardiovascular:      Rate and Rhythm: Normal rate and regular rhythm.      Pulses: Normal pulses.           Carotid pulses are 2+ on the right side and 2+ on the left side.       Radial pulses are 2+ on the right side and 2+ on the left side.        Posterior tibial pulses are 2+ on the right side and 2+ on the left side.      Heart sounds: Normal heart sounds. No murmur heard.     No friction rub. No gallop.   Pulmonary:      Effort: Pulmonary effort is normal. No respiratory distress.      Breath sounds: Normal breath sounds. No stridor. No wheezing or rales.      Comments: Clear Anterior and Posterior BBS  Abdominal:      General: Abdomen is protuberant. Bowel sounds are normal. There is no distension.      Palpations: Abdomen is soft.      " Tenderness: There is no abdominal tenderness. There is no guarding.   Musculoskeletal:         General: No tenderness or deformity. Normal range of motion.      Cervical back: Normal range of motion and neck supple. No rigidity.      Right lower leg: No edema.      Left lower leg: No edema.   Lymphadenopathy:      Head:      Right side of head: No submental, submandibular, tonsillar, preauricular, posterior auricular or occipital adenopathy.      Left side of head: No submental, submandibular, tonsillar, preauricular, posterior auricular or occipital adenopathy.      Cervical: No cervical adenopathy.      Right cervical: No superficial cervical adenopathy.     Left cervical: No superficial cervical adenopathy.   Skin:     General: Skin is warm and dry.      Capillary Refill: Capillary refill takes 2 to 3 seconds.      Coloration: Skin is not pale.      Findings: No erythema or rash.   Neurological:      Mental Status: She is alert and oriented to person, place, and time. Mental status is at baseline.      GCS: GCS eye subscore is 4. GCS verbal subscore is 5. GCS motor subscore is 6.      Motor: No abnormal muscle tone.      Coordination: Coordination normal.   Psychiatric:         Attention and Perception: Attention and perception normal.         Mood and Affect: Mood and affect normal.         Speech: Speech normal.         Behavior: Behavior normal. Behavior is cooperative.         Thought Content: Thought content normal.         Cognition and Memory: Cognition and memory normal.         Judgment: Judgment normal.          Significant Labs:  Lab Results   Component Value Date    WBC 7.65 02/20/2024    HGB 14.0 02/20/2024    HCT 41.7 02/20/2024     02/20/2024    ALT 17 02/20/2024    AST 16 02/20/2024     02/20/2024    K 3.8 02/20/2024     02/20/2024    CREATININE 0.7 02/20/2024    BUN 14 02/20/2024    CO2 28 02/20/2024    TSH 0.966 02/20/2024    INR 0.9 02/20/2024    HGBA1C 5.7 (H) 02/20/2024        Diagnostic Studies: No relevant studies.    EKG:   Results for orders placed or performed during the hospital encounter of 02/20/24   EKG 12-lead    Collection Time: 02/20/24  9:46 AM   Result Value Ref Range    QRS Duration 100 ms    [facility] QTC Calculation 441 ms    Narrative    Test Reason : I10,M43.26,M16.12,    Vent. Rate : 045 BPM     Atrial Rate : 045 BPM     P-R Int : 174 ms          QRS Dur : 100 ms      QT Int : 510 ms       P-R-T Axes : 052 026 042 degrees     QTc Int : 441 ms    Marked sinus bradycardia  Abnormal ECG  No previous ECGs available  Confirmed by ROSS SHEARER MD ([id number]) on 2/20/2024 12:29:08 PM    Referred By: CARLOS GOINS           Confirmed By:ROSS SHEARER MD       2D ECHO:  TTE:  No results found for this or any previous visit.    ISA:  No results found for this or any previous visit.     Imaging   Active Cardiac Conditions: None      Revised Cardiac Risk Index   High -Risk Surgery  Intraperitoneal; Intrathoracic; suprainguinal vascular Yes- + 1 No- 0   History of Ischemic Heart Disease   (Hx of MI/positive exercise test/current chest pain due to ischemia/use of nitrate therapy/EKG with pathological Q waves) Yes- + 1 No- 0   History of CHF  (Pulmonary edema/bilateral rales or S3 gallop/PND/CXR showing pulmonary vascular redistribution) Yes- + 1 No- 0   History of CVA   (Prior stroke or TIA) Yes- + 1 No- 0   Pre-operative treatment with insulin Yes- + 1 No- 0   Pre-operative creatinine > 2mg/dl Yes- + 1 No- 0   Total:      Risk Status:  Estimated risk of cardiac complications after non-cardiac surgery using the Revised Cardiac Risk Index for Preoperative risk is 3.9 %      ARISCAT (Canet) risk index: Low: 1.6% risk of post-op pulmonary complications.    American Society of Anesthesiologists Physical Status classification (ASA): 3         No further cardiac workup needed prior to surgery.    Outpatient Subjective & Objective

## 2024-02-20 NOTE — PROGRESS NOTES
[facility]  Progress Note    Patient Name: Ina Blair  MRN: [id number]  Date of Evaluation- 03/15/2024  PCP- No, Primary Doctor        HPI:  This is a 53 y.o. female  who presents today for a preoperative evaluation in preparation for spine surgery- DECOMPRESSION, SPINE, CERVICAL, POSTERIOR APPROACH. Patient is new to me.    The history has been obtained by speaking with the patient and reviewing the electronic medical record and/or outside health information. Significant health conditions for the perioperative period are discussed below in assessment and plan.     Patient reports current health status to be Good.  Denies any new symptoms before surgery.       Subjective/ Objective:     Chief Complaint: Preoperative evaulation, perioperative medical management, and complication reduction plan.     Functional Capacity:  Able to climb a flight of stairs without CP SOB or Syncope.  Able to meet 4 METs      Anesthesia issues: None    Difficulty mouth opening: n    Steroid use in the last 12 months:  n    Dental Issues:missing    Family anesthesia difficulty: None     Family Hx of Thrombosis  mother with childdbirth    Past Medical History:   Diagnosis Date    Enlarged thyroid 2/20/2024    Headache     Hydrocephalus     Hypertension     Neuropathy     Unspecified osteoarthritis, unspecified site      Past Surgical History:   Procedure Laterality Date    APPENDECTOMY      bladder stretch      CARPAL TUNNEL RELEASE Bilateral     LAMINECTOMY AND MICRODISCECTOMY LUMBAR SPINE  2021    LUMBAR FUSION  2008    TOTAL HIP ARTHROPLASTY Left 10/31/2023    TOTAL KNEE ARTHROPLASTY Bilateral     VENTRICULOPERITONEAL SHUNT         Review of Systems   Constitutional:  Positive for unexpected weight change. Negative for chills, fatigue and fever.   HENT:  Positive for trouble swallowing. Negative for voice change.    Eyes:  Negative for photophobia and visual disturbance.        No acute visual changes  "  Respiratory:  Negative for apnea, cough, chest tightness, shortness of breath and wheezing.         STOP bang  Score  4    High risk MERA   Cardiovascular:  Negative for chest pain, palpitations and leg swelling.        Low HR   Gastrointestinal:  Positive for constipation. Negative for abdominal distention, abdominal pain, blood in stool, diarrhea, nausea and vomiting.        NO FLD, hepatitis, cirrhosis  No BRB or black tarry stool     Genitourinary:  Negative for difficulty urinating, dysuria, flank pain, frequency, hematuria and urgency.        Stress incontinence   Musculoskeletal:  Positive for back pain. Negative for arthralgias, gait problem, joint swelling, myalgias, neck pain and neck stiffness.   Neurological:  Positive for tremors, numbness and headaches. Negative for dizziness, seizures, syncope, weakness and light-headedness.   Psychiatric/Behavioral:  Negative for suicidal ideas.       VITALS  Visit Vitals  /69 (BP Location: Left arm, Patient Position: Sitting)   Pulse (!) 47   Temp 98.1 °F (36.7 °C) (Oral)   Ht 5' 4" (1.626 m)   Wt 102 kg (224 lb 13.9 oz)   SpO2 96%   BMI 38.60 kg/m²          Physical Exam  Constitutional:       General: She is not in acute distress.     Appearance: Normal appearance. She is well-developed. She is not diaphoretic.   HENT:      Head: Normocephalic.      Right Ear: Hearing normal.      Left Ear: Hearing normal.      Nose: Nose normal.      Mouth/Throat:      Lips: Pink.      Mouth: Mucous membranes are moist.      Pharynx: No oropharyngeal exudate.   Eyes:      General: Lids are normal.         Right eye: No discharge.         Left eye: No discharge.      Conjunctiva/sclera: Conjunctivae normal.      Pupils: Pupils are equal, round, and reactive to light.   Neck:      Thyroid: No thyromegaly.      Vascular: No carotid bruit or JVD.      Trachea: Trachea and phonation normal. No tracheal deviation.   Cardiovascular:      Rate and Rhythm: Normal rate and regular " rhythm.      Pulses: Normal pulses.           Carotid pulses are 2+ on the right side and 2+ on the left side.       Radial pulses are 2+ on the right side and 2+ on the left side.        Posterior tibial pulses are 2+ on the right side and 2+ on the left side.      Heart sounds: Normal heart sounds. No murmur heard.     No friction rub. No gallop.   Pulmonary:      Effort: Pulmonary effort is normal. No respiratory distress.      Breath sounds: Normal breath sounds. No stridor. No wheezing or rales.      Comments: Clear Anterior and Posterior BBS  Abdominal:      General: Abdomen is protuberant. Bowel sounds are normal. There is no distension.      Palpations: Abdomen is soft.      Tenderness: There is no abdominal tenderness. There is no guarding.   Musculoskeletal:         General: No tenderness or deformity. Normal range of motion.      Cervical back: Normal range of motion and neck supple. No rigidity.      Right lower leg: No edema.      Left lower leg: No edema.   Lymphadenopathy:      Head:      Right side of head: No submental, submandibular, tonsillar, preauricular, posterior auricular or occipital adenopathy.      Left side of head: No submental, submandibular, tonsillar, preauricular, posterior auricular or occipital adenopathy.      Cervical: No cervical adenopathy.      Right cervical: No superficial cervical adenopathy.     Left cervical: No superficial cervical adenopathy.   Skin:     General: Skin is warm and dry.      Capillary Refill: Capillary refill takes 2 to 3 seconds.      Coloration: Skin is not pale.      Findings: No erythema or rash.   Neurological:      Mental Status: She is alert and oriented to person, place, and time. Mental status is at baseline.      GCS: GCS eye subscore is 4. GCS verbal subscore is 5. GCS motor subscore is 6.      Motor: No abnormal muscle tone.      Coordination: Coordination normal.   Psychiatric:         Attention and Perception: Attention and perception  normal.         Mood and Affect: Mood and affect normal.         Speech: Speech normal.         Behavior: Behavior normal. Behavior is cooperative.         Thought Content: Thought content normal.         Cognition and Memory: Cognition and memory normal.         Judgment: Judgment normal.          Significant Labs:  Lab Results   Component Value Date    WBC 7.65 02/20/2024    HGB 14.0 02/20/2024    HCT 41.7 02/20/2024     02/20/2024    ALT 17 02/20/2024    AST 16 02/20/2024     02/20/2024    K 3.8 02/20/2024     02/20/2024    CREATININE 0.7 02/20/2024    BUN 14 02/20/2024    CO2 28 02/20/2024    TSH 0.966 02/20/2024    INR 0.9 02/20/2024    HGBA1C 5.7 (H) 02/20/2024       Diagnostic Studies: No relevant studies.    EKG:   Results for orders placed or performed during the hospital encounter of 02/20/24   EKG 12-lead    Collection Time: 02/20/24  9:46 AM   Result Value Ref Range    QRS Duration 100 ms    [facility] QTC Calculation 441 ms    Narrative    Test Reason : I10,M43.26,M16.12,    Vent. Rate : 045 BPM     Atrial Rate : 045 BPM     P-R Int : 174 ms          QRS Dur : 100 ms      QT Int : 510 ms       P-R-T Axes : 052 026 042 degrees     QTc Int : 441 ms    Marked sinus bradycardia  Abnormal ECG  No previous ECGs available  Confirmed by ROSS SHEARER MD ([id number]) on 2/20/2024 12:29:08 PM    Referred By: CARLOS GOINS           Confirmed By:ROSS SHEARER MD       2D ECHO:  TTE:  No results found for this or any previous visit.    ISA:  No results found for this or any previous visit.     Imaging   Active Cardiac Conditions: None      Revised Cardiac Risk Index   High -Risk Surgery  Intraperitoneal; Intrathoracic; suprainguinal vascular Yes- + 1 No- 0   History of Ischemic Heart Disease   (Hx of MI/positive exercise test/current chest pain due to ischemia/use of nitrate therapy/EKG with pathological Q waves) Yes- + 1 No- 0   History of CHF  (Pulmonary edema/bilateral rales or S3 gallop/PND/CXR showing  pulmonary vascular redistribution) Yes- + 1 No- 0   History of CVA   (Prior stroke or TIA) Yes- + 1 No- 0   Pre-operative treatment with insulin Yes- + 1 No- 0   Pre-operative creatinine > 2mg/dl Yes- + 1 No- 0   Total:      Risk Status:  Estimated risk of cardiac complications after non-cardiac surgery using the Revised Cardiac Risk Index for Preoperative risk is 3.9 %      ARISCAT (Canet) risk index: Low: 1.6% risk of post-op pulmonary complications.    American Society of Anesthesiologists Physical Status classification (ASA): 3         No further cardiac workup needed prior to surgery.        Preoperative cardiac risk assessment-  The patient does not have any active cardiac conditions . Revised cardiac risk index predictors- ---.Functional capacity is more than 4 Mets. She will be undergoing a Spine procedure that carries a Moderate Risk risk     The estimated risk of the rate of adverse cardiac outcomes  3.9    No further cardiac work up is indicated prior to proceeding with the surgery     Orders Placed This Encounter    CBC Auto Differential    Comprehensive Metabolic Panel    Protime-INR    APTT    TSH    Hemoglobin A1C    EKG 12-lead       American Society of Anesthesiologists Physical status classification ( ASA ) class: 3     Postoperative pulmonary complication risk assessment: 1.6     ARISCAT ( Canet) risk index- risk class -  Low, if duration of surgery is under 3 hours, intermediate, if duration of surgery is over 3 hours       Assessment/Plan:     Primary hypertension  Current BP  123/69 today.    Taking:Novasc Losartan   Lifestyle changes to reduce systolic BP:  exercise 30 minutes per day,  5 days per week or 150 minutes weekly; sodium reduction and avoidance of high salt foods such as processed meats, frozen meals and  fast foods.   Keeping a healthy weight/BMI can help with better BP control    BP acceptable for surgery. I recommend monitoring BP during perioperative period as uncontrolled pain  can elevate blood pressure.         Obesity  BMI 38.6    Enlarged thyroid  Enlarged thyroid noted incidentally on scan of neck.   TSH- nl.  Pt will need to f/u with thyroid US at home- results in media      Bradycardia  Holter Monitor performed  Average HR 59- see results below            Narrative  Performed by: BRIAN  Test Reason : I10,M43.26,M16.12,     Vent. Rate : 045 BPM     Atrial Rate : 045 BPM      P-R Int : 174 ms          QRS Dur : 100 ms       QT Int : 510 ms       P-R-T Axes : 052 026 042 degrees      QTc Int : 441 ms     Marked sinus bradycardia   Abnormal ECG   No previous ECGs available   Confirmed by ROSS SHEARER MD ([id number]) on 2/20/2024 12:29:08 PM     Referred By: CARLOS GOINS           Confirmed By:ROSS SHEARER MD      Specimen Collected: 02/20/24 09:46 CST Last Resulted: 02/20/24 12:29 CST                    Preventive perioperative care    Thromboembolic prophylaxis:  Her risk factors for thrombosis include morbid obesity, surgical procedure, age, and reduced mobility.I suggest  thromboembolic prophylaxis ( mechanical/pharmacological, weighing the risk benefits of pharmacological agent use considering boyd procedural bleeding )  during the perioperative period.I suggested being active in the post operative period.      Postoperative pulmonary complication prophylaxis-Risk factors for post operative pulmonary complications include ASA class >2- I suggest incentive spirometry use, early ambulation, and pain control so as to avoid diaphragmatic splinting  Brush teeth twice per day, oral rinses, sleep with the head of the bed up 30 degrees     Renal complication prophylaxis-Risk factors for renal complications include age and hypertension . I suggest keeping her well hydrated and avoidance/ minimizing the use of  NSAID's,TARIQ 2 Inhibitors ,IV contrast if possible in the perioperative period.I suggested drinking 2 litre's of water a day      Surgical site Infection Prophylaxis-I  suggest  appropriate antibiotic for Prophylaxis against Surgical site infections Shower with Hibiclens in the night before surgery and the morning of surgery     In view of Spine procedure the patient  is at risk of postoperative urinary retention.  I suggest avoidance / minimizing the of  Benzodiazepines,Anticholinergic medication,antihistamines ( Benadryl) , if possible in the perioperative period. I suggest using the minimum possible use of opioids for the minimum period of time in the perioperative period. Benadryl avoidance suggested      This visit was focused on Preoperative evaluation, Perioperative Medical management, complication reduction plans. I suggest that the patient follows up with primary care or relevant sub specialists for ongoing health care.    I appreciate the opportunity to be involved in this patients care. Please feel free to contact me if there were any questions about this consultation.    Patient is optimized    I spent a total of 66 minutes on the day of the visit.This includes face to face time and non-face to face time preparing to see the patient (eg, review of tests), obtaining and/or reviewing separately obtained history, documenting clinical information in the electronic or other health record, independently interpreting results and communicating results to the patient/family/caregiver, or care coordinator.     Brian Brennan NP  Perioperative Medicine  [facility]   Pager [phone]

## 2024-02-20 NOTE — ASSESSMENT & PLAN NOTE
Holter Monitor performed  Average HR 59- see results below            Narrative  Performed by: GEMUSE  Test Reason : I10,M43.26,M16.12,     Vent. Rate : 045 BPM     Atrial Rate : 045 BPM      P-R Int : 174 ms          QRS Dur : 100 ms       QT Int : 510 ms       P-R-T Axes : 052 026 042 degrees      QTc Int : 441 ms     Marked sinus bradycardia   Abnormal ECG   No previous ECGs available   Confirmed by ROSS SHEARER MD ([id number]) on 2/20/2024 12:29:08 PM     Referred By: CARLOS GOINS           Confirmed By:ROSS SHEARER MD      Specimen Collected: 02/20/24 09:46 CST Last Resulted: 02/20/24 12:29 CST

## 2024-02-20 NOTE — Clinical Note
Ms. Blair needs the following items resolved/investigated prior to surgery:  F/U at home for severe bradycardia unknown origin Enlarged thyroid found of MRI- Thyroid US suggested by radiologist TSH was checked and was normal  Thank you, Brian

## 2024-02-20 NOTE — ASSESSMENT & PLAN NOTE
Current BP  123/69 today.    Taking:Novasc Losartan   Lifestyle changes to reduce systolic BP:  exercise 30 minutes per day,  5 days per week or 150 minutes weekly; sodium reduction and avoidance of high salt foods such as processed meats, frozen meals and  fast foods.   Keeping a healthy weight/BMI can help with better BP control    BP acceptable for surgery. I recommend monitoring BP during perioperative period as uncontrolled pain can elevate blood pressure.

## 2024-02-20 NOTE — HPI
This is a 53 y.o. female  who presents today for a preoperative evaluation in preparation for spine surgery- DECOMPRESSION, SPINE, CERVICAL, POSTERIOR APPROACH. Patient is new to me.    The history has been obtained by speaking with the patient and reviewing the electronic medical record and/or outside health information. Significant health conditions for the perioperative period are discussed below in assessment and plan.     Patient reports current health status to be Good.  Denies any new symptoms before surgery.

## 2024-02-20 NOTE — DISCHARGE INSTRUCTIONS
Your surgery has been scheduled for:______3/20/24____________________________________    You should report to:  ____[facility], located on the [place] side of the first floor of the           [facility] ([phone])  _X___[facility], located on the [facility] side of the            Second floor of the [facility] [phone])  ____3rd Floor [facility] located on the [facility] side of the [facility] [phone]  ____[facility]: located at [address] [facility] [place] [address]. [address].     Please Note   Tell your doctor if you take Aspirin, products containing Aspirin, herbal medications  or blood thinners, such as Coumadin, Ticlid, or Plavix.  (Consult your provider regarding holding or stopping before surgery).  Arrange for someone to drive you home following surgery.  You will not be allowed to leave the surgical facility alone or drive yourself home following sedation and anesthesia.    Before Surgery  Stop taking all herbal medications, vitamins, and supplements 7 days prior to surgery  No Motrin/Advil (Ibuprofen) 7 days before surgery  No Aleve (Naproxen) 7 days before surgery  Stop Taking Asprin, products containing Aspirin __7___days before surgery   No Goody's/BC Powder 7 days before surgery  Refrain from drinking alcoholic beverages for 24 hours before and after surgery  Stop or limit smoking at least 24 hours prior to surgery  You may take Tylenol for pain    Night before Surgery  Do not eat or drink after midnight  Take a shower or bath (shower is recommended).  Bathe with Hibiclens soap or an antibacterial soap from the neck down.  If not supplied by your surgeon, hibiclens soap will need to be purchased over the counter in pharmacy.  Rinse soap off thoroughly.  Shampoo your hair with your regular shampoo    The Day of Surgery  Take another bath or shower with hibiclens  or any antibacterial soap, to reduce the chance of infection.  Take heart and blood pressure medications with a small sip of water, as advised by the perioperative team.  Do not take fluid pills  You may brush your teeth and rinse your mouth, but do not swallow any additional water.   Do not apply perfumes, powder, body lotions or deodorant on the day of surgery.  Nail polish should be removed.  Do not wear makeup or moisturizer  Wear comfortable clothes, such as a button front shirt and loose fitting pants.  Leave all jewelry, including body piercings, and valuables at home.    Bring any devices you will neeed after surgery such as crutches or canes.  If you have sleep apnea, please bring your CPAP machine  In the event that your physical condition changes including the onset of a cold or respiratory illness, or if you have to delay or cancel your surgery, please notify your surgeon.

## 2024-02-20 NOTE — Clinical Note
Good day, I am following up on Ms. Solares's Cardiology consult for bradycardia?  Dr. Leopold from anesthesia was asking about the follow up and I can't find any information in media.  Thank you, Brian

## 2024-02-20 NOTE — ASSESSMENT & PLAN NOTE
Enlarged thyroid noted incidentally on scan of neck.   TSH- nl.  Pt will need to f/u with thyroid US at home- results in media

## 2024-02-22 ENCOUNTER — TELEPHONE (OUTPATIENT)
Dept: NEUROSURGERY | Facility: CLINIC | Age: 54
End: 2024-02-22
Payer: COMMERCIAL

## 2024-02-22 NOTE — TELEPHONE ENCOUNTER
[facility] patient spoke with Dr. Meek to discuss findings of MRI. Posterior cervical fusion C3-6 surgery discussed and patient agreeable. Surgery date will be 3/20/24. Will [facility] to [facility] for [facility]/18-3/28/24. faxed all clinical notes to home provider with recommendations for thyroid ultrasound, followup with EKG for bradycardia. Discussed all with patient, patient states she has a followup with her home provider tomorrow on 2/23/24 for followup.    Ashley Araiza, RN, CMSRN  RN Navigator  [facility]   [phone]  Fax [phone]

## 2024-02-22 NOTE — TELEPHONE ENCOUNTER
I spent 15 minutes in patient counseling by telephone to review her recent post visit imaging. Her MRI C spine shows severe cervical stenosis with myelomalacia. These findings confirm the myelopathic signs and symptoms found during our clinic visit on Monday. I recommned a C3-C6 PCF first before later pursuing lumbar surgery. R/B/A/I/M were reviewed in detail and she wishes to proceed.

## 2024-02-23 ENCOUNTER — TELEPHONE (OUTPATIENT)
Dept: NEUROSURGERY | Facility: CLINIC | Age: 54
End: 2024-02-23
Payer: COMMERCIAL

## 2024-02-23 DIAGNOSIS — M47.12 CERVICAL SPONDYLOSIS WITH MYELOPATHY: Primary | ICD-10-CM

## 2024-02-23 DIAGNOSIS — Z01.818 PREOP TESTING: ICD-10-CM

## 2024-02-27 ENCOUNTER — TELEPHONE (OUTPATIENT)
Dept: NEUROSURGERY | Facility: CLINIC | Age: 54
End: 2024-02-27
Payer: COMMERCIAL

## 2024-02-27 NOTE — TELEPHONE ENCOUNTER
Received disability paperwork from patient and forwarded to [email or web address]

## 2024-02-29 ENCOUNTER — PATIENT MESSAGE (OUTPATIENT)
Dept: ORTHOPEDICS | Facility: CLINIC | Age: 54
End: 2024-02-29
Payer: COMMERCIAL

## 2024-03-12 ENCOUNTER — PATIENT MESSAGE (OUTPATIENT)
Dept: SPINE | Facility: CLINIC | Age: 54
End: 2024-03-12
Payer: COMMERCIAL

## 2024-03-12 ENCOUNTER — PATIENT MESSAGE (OUTPATIENT)
Dept: ADMINISTRATIVE | Facility: OTHER | Age: 54
End: 2024-03-12
Payer: COMMERCIAL

## 2024-03-14 ENCOUNTER — ANESTHESIA EVENT (OUTPATIENT)
Dept: SURGERY | Facility: HOSPITAL | Age: 54
DRG: 472 | End: 2024-03-14
Payer: COMMERCIAL

## 2024-03-14 NOTE — ANESTHESIA PREPROCEDURE EVALUATION
03/14/2024  Ina Blair is a 53 y.o., female.    Cardiology evaluation pending    Thyroid U/S 2/28/24:      Pre-op Assessment    I have reviewed the Patient Summary Reports.          Review of Systems  Anesthesia Hx:  No problems with previous Anesthesia                Social:  Non-Smoker       Hematology/Oncology:  Hematology Normal   Oncology Normal                                   EENT/Dental:  EENT/Dental Normal           Cardiovascular:     Hypertension              ECG has been reviewed. Bradycardia                         Pulmonary:  Pulmonary Normal                       Renal/:  Renal/ Normal                 Hepatic/GI:  Hepatic/GI Normal                 Musculoskeletal:  Musculoskeletal Normal    Cervical spondylosis with myelopathy            Neurological:      Headaches                                 Endocrine:  Endocrine Normal    Enlarged thyroid      Obesity / BMI > 30  Dermatological:  Skin Normal    Psych:  Psychiatric Normal                  Physical Exam  General: Alert and Oriented    Airway:  Mallampati: II / II  Mouth Opening: Normal  TM Distance: Normal  Tongue: Normal  Neck ROM: Normal ROM    Dental:  Intact    Chest/Lungs:  Clear to auscultation, Normal Respiratory Rate    Heart:  Rate: Normal  Rhythm: Regular Rhythm  Sounds: Normal    Anesthesia Plan  Type of Anesthesia, risks & benefits discussed:    Anesthesia Type: Gen ETT  Intra-op Monitoring Plan: Standard ASA Monitors and Art Line  Post Op Pain Control Plan: multimodal analgesia  Airway Plan: Direct  Informed Consent: Informed consent signed with the Patient and all parties understand the risks and agree with anesthesia plan.  All questions answered.   ASA Score: 2    Ready For Surgery From Anesthesia Perspective.   .

## 2024-03-18 ENCOUNTER — PATIENT MESSAGE (OUTPATIENT)
Dept: ADMINISTRATIVE | Facility: OTHER | Age: 54
End: 2024-03-18
Payer: COMMERCIAL

## 2024-03-19 ENCOUNTER — OFFICE VISIT (OUTPATIENT)
Dept: NEUROSURGERY | Facility: CLINIC | Age: 54
End: 2024-03-19
Payer: COMMERCIAL

## 2024-03-19 VITALS
TEMPERATURE: 98 F | SYSTOLIC BLOOD PRESSURE: 115 MMHG | DIASTOLIC BLOOD PRESSURE: 69 MMHG | WEIGHT: 293 LBS | BODY MASS INDEX: 85.52 KG/M2

## 2024-03-19 DIAGNOSIS — M47.12 CERVICAL SPONDYLOSIS WITH MYELOPATHY: ICD-10-CM

## 2024-03-19 DIAGNOSIS — Z01.818 PREOP TESTING: Primary | ICD-10-CM

## 2024-03-19 PROCEDURE — 99024 POSTOP FOLLOW-UP VISIT: CPT | Mod: S$GLB,COE,, | Performed by: NURSE PRACTITIONER

## 2024-03-19 PROCEDURE — 99999 PR PBB SHADOW E&M-EST. PATIENT-LVL III: CPT | Mod: PBBFAC,COE,, | Performed by: NURSE PRACTITIONER

## 2024-03-19 RX ORDER — LISDEXAMFETAMINE DIMESYLATE CAPSULES 20 MG/1
20 CAPSULE ORAL EVERY MORNING
COMMUNITY

## 2024-03-19 NOTE — PROGRESS NOTES
Neurosurgery  Established Patient    SUBJECTIVE:     History of Present Illness: Ina Blair is a 53 y.o. female with history of hydrocephalus status post shunt and prior L4-5 TLIF who presented with low back pain as well as bilateral radicular thigh pain which progressively getting worse.  Back pain is aggravated with standing sitting or walking.  Pain got better when she sat down and rested.  She can not walk more than 1 block without pain.  Leaning forward help with pain.  Pain radiates to the anterior aspect of both thighs.  At the knees.  She also reports some pain that radiates to the buttock to the posterior aspect of her thighs especially when she walks for long distances.  She reports some urinary incontinence.  Upon further questioning she also reports some difficulty ambulating lately with unsteady gait as well as she has been dropping some objects.  She failed conservative management including multimodal pain medications, injections, and physical therapy.     She has Codman Hakim set at 100.     Interval Hx 3/19/24: After the last appointment with Dr. Meek, it was recommended that the patient first undergo cervical decompression PCF C3-6 due to severe stenosis and  myelomalacia at those levels seen on recent imaging contributing to her myelopathy. She is being seen in clinic today for her pre-op evaluation to answer additional questions or concerns she may have about surgery. Denies any changes to her symptoms since the last appointment.     Review of patient's allergies indicates:   Allergen Reactions    Adhesive     Adhesive tape-silicones        Current Outpatient Medications   Medication Sig Dispense Refill    acetaminophen (TYLENOL) 500 MG tablet Take 1,000 mg by mouth every 8 (eight) hours as needed for Pain.      ALPRAZolam (XANAX) 0.5 MG tablet Take 0.5 mg by mouth 2 (two) times daily as needed.      amLODIPine (NORVASC) 2.5 MG tablet TAKE ONE TABLET BY MOUTH DAILY - MAY TAKE 2  TABLETS IF BLOOD PRESSURE IS OVER 160      EScitalopram oxalate (LEXAPRO) 20 MG tablet Take 20 mg by mouth.      gabapentin (NEURONTIN) 400 MG capsule Take 400 mg by mouth 3 (three) times daily.      ibuprofen (ADVIL,MOTRIN) 800 MG tablet Take 800 mg by mouth every 8 (eight) hours.      lisdexamfetamine (VYVANSE) 20 MG capsule Take 20 mg by mouth every morning.      losartan-hydrochlorothiazide 50-12.5 mg (HYZAAR) 50-12.5 mg per tablet Take 1 tablet by mouth once daily.      naloxone (NARCAN) 4 mg/actuation Spry 4 mg by Nasal route daily as needed.      oxyCODONE (ROXICODONE) 5 MG immediate release tablet Take 5 mg by mouth every 4 (four) hours as needed. only when in severe pain      tirzepatide (MOUNJARO) 2.5 mg/0.5 mL PnIj Inject 2.5 mg into the skin once a week.       No current facility-administered medications for this visit.       Past Medical History:   Diagnosis Date    Enlarged thyroid 2/20/2024    Headache     Hydrocephalus     Hypertension     Neuropathy     Unspecified osteoarthritis, unspecified site      Past Surgical History:   Procedure Laterality Date    APPENDECTOMY      bladder stretch      CARPAL TUNNEL RELEASE Bilateral     LAMINECTOMY AND MICRODISCECTOMY LUMBAR SPINE  2021    LUMBAR FUSION  2008    TOTAL HIP ARTHROPLASTY Left 10/31/2023    TOTAL KNEE ARTHROPLASTY Bilateral     VENTRICULOPERITONEAL SHUNT       Family History       Problem Relation (Age of Onset)    Cancer Mother    Heart disease Mother    Hypertension Mother          Social History     Socioeconomic History    Marital status:      Spouse name: Tony    Number of children: 2   Tobacco Use    Smoking status: Former     Types: Cigarettes    Tobacco comments:     Smoked cigarettes 33 years 1/2 pack per day       Review of Systems    OBJECTIVE:     Vital Signs  Temp: 97.9 °F (36.6 °C)  Pulse: (P) 60  BP: 115/69  Pain Score:   4  Weight: (!) 226 kg (498 lb 3.8 oz)  Body mass index is 85.52 kg/m².    Neurosurgery Physical  Exam  General: well developed, well nourished, no distress.   Head: normocephalic, atraumatic  Neurologic: Alert and oriented. Thought content appropriate.  GCS: Motor: 6/Verbal: 5/Eyes: 4 GCS Total: 15  Mental Status: Awake, Alert, Oriented x 4  Language: No aphasia  Speech: No dysarthria  Cranial nerves: face symmetric, tongue midline, CN II-XII grossly intact.   Eyes: pupils equal, round, reactive to light with accomodation, EOMI.   Pulmonary: normal respirations, no signs of respiratory distress  Abdomen: soft, non-distended  Skin: Skin is warm, dry and intact.    Diagnostic Results:  There is no new imaging to review for this encounter.      ASSESSMENT/PLAN:     Ina Blair is a 53 y.o. female seen in clinic today for her pre-op evaluation to answer additional questions or concerns she may have about her PCF C3-6 due to severe stenosis and myelomalacia at those levels. R/B/A/I/M were reviewed in detail and she wishes to proceed.  I have encouraged her to contact the clinic with any questions, concerns, or adverse clinical changes. She verbalized understanding.      HERACLIO Larson  Neurosurgery  [facility].      Note dictated with voice recognition software, please excuse any grammatical errors.

## 2024-03-20 ENCOUNTER — ANESTHESIA (OUTPATIENT)
Dept: SURGERY | Facility: HOSPITAL | Age: 54
DRG: 472 | End: 2024-03-20
Payer: COMMERCIAL

## 2024-03-20 ENCOUNTER — HOSPITAL ENCOUNTER (INPATIENT)
Facility: HOSPITAL | Age: 54
LOS: 3 days | Discharge: HOME | DRG: 472 | End: 2024-03-23
Attending: NEUROLOGICAL SURGERY | Admitting: NEUROLOGICAL SURGERY
Payer: COMMERCIAL

## 2024-03-20 DIAGNOSIS — G95.9 CERVICAL MYELOPATHY: ICD-10-CM

## 2024-03-20 LAB
ABO + RH BLD: NORMAL
BLD GP AB SCN CELLS X3 SERPL QL: NORMAL
SPECIMEN OUTDATE: NORMAL

## 2024-03-20 PROCEDURE — 00NW0ZZ RELEASE CERVICAL SPINAL CORD, OPEN APPROACH: ICD-10-PCS | Performed by: NEUROLOGICAL SURGERY

## 2024-03-20 PROCEDURE — 63600175 PHARM REV CODE 636 W HCPCS: Performed by: NURSE ANESTHETIST, CERTIFIED REGISTERED

## 2024-03-20 PROCEDURE — 86901 BLOOD TYPING SEROLOGIC RH(D): CPT | Performed by: STUDENT IN AN ORGANIZED HEALTH CARE EDUCATION/TRAINING PROGRAM

## 2024-03-20 PROCEDURE — 25000003 PHARM REV CODE 250: Performed by: NURSE ANESTHETIST, CERTIFIED REGISTERED

## 2024-03-20 PROCEDURE — 22600 ARTHRD PST TQ 1NTRSPC CRV: CPT | Mod: 51,COE,, | Performed by: NEUROLOGICAL SURGERY

## 2024-03-20 PROCEDURE — 36000711: Performed by: NEUROLOGICAL SURGERY

## 2024-03-20 PROCEDURE — 71000016 HC POSTOP RECOV ADDL HR: Performed by: NEUROLOGICAL SURGERY

## 2024-03-20 PROCEDURE — 25000003 PHARM REV CODE 250: Performed by: STUDENT IN AN ORGANIZED HEALTH CARE EDUCATION/TRAINING PROGRAM

## 2024-03-20 PROCEDURE — 25000003 PHARM REV CODE 250: Performed by: NEUROLOGICAL SURGERY

## 2024-03-20 PROCEDURE — 37000009 HC ANESTHESIA EA ADD 15 MINS: Performed by: NEUROLOGICAL SURGERY

## 2024-03-20 PROCEDURE — 22614 ARTHRD PST TQ 1NTRSPC EA ADD: CPT | Mod: COE,,, | Performed by: NEUROLOGICAL SURGERY

## 2024-03-20 PROCEDURE — 63600175 PHARM REV CODE 636 W HCPCS: Performed by: ANESTHESIOLOGY

## 2024-03-20 PROCEDURE — 20936 SP BONE AGRFT LOCAL ADD-ON: CPT | Mod: COE,,, | Performed by: NEUROLOGICAL SURGERY

## 2024-03-20 PROCEDURE — 36415 COLL VENOUS BLD VENIPUNCTURE: CPT | Performed by: NEUROLOGICAL SURGERY

## 2024-03-20 PROCEDURE — 71000015 HC POSTOP RECOV 1ST HR: Performed by: NEUROLOGICAL SURGERY

## 2024-03-20 PROCEDURE — 22842 INSERT SPINE FIXATION DEVICE: CPT | Mod: COE,,, | Performed by: NEUROLOGICAL SURGERY

## 2024-03-20 PROCEDURE — D9220A PRA ANESTHESIA: Mod: CRNA,COE,, | Performed by: NURSE ANESTHETIST, CERTIFIED REGISTERED

## 2024-03-20 PROCEDURE — 27201423 OPTIME MED/SURG SUP & DEVICES STERILE SUPPLY: Performed by: NEUROLOGICAL SURGERY

## 2024-03-20 PROCEDURE — 36000710: Performed by: NEUROLOGICAL SURGERY

## 2024-03-20 PROCEDURE — 01N10ZZ RELEASE CERVICAL NERVE, OPEN APPROACH: ICD-10-PCS | Performed by: NEUROLOGICAL SURGERY

## 2024-03-20 PROCEDURE — 11000001 HC ACUTE MED/SURG PRIVATE ROOM

## 2024-03-20 PROCEDURE — 71000033 HC RECOVERY, INTIAL HOUR: Performed by: NEUROLOGICAL SURGERY

## 2024-03-20 PROCEDURE — 63015 REMOVE SPINE LAMINA >2 CRVCL: CPT | Mod: COE,,, | Performed by: NEUROLOGICAL SURGERY

## 2024-03-20 PROCEDURE — D9220A PRA ANESTHESIA: Mod: ANES,COE,, | Performed by: ANESTHESIOLOGY

## 2024-03-20 PROCEDURE — C1713 ANCHOR/SCREW BN/BN,TIS/BN: HCPCS | Performed by: NEUROLOGICAL SURGERY

## 2024-03-20 PROCEDURE — 37000008 HC ANESTHESIA 1ST 15 MINUTES: Performed by: NEUROLOGICAL SURGERY

## 2024-03-20 PROCEDURE — C1729 CATH, DRAINAGE: HCPCS | Performed by: NEUROLOGICAL SURGERY

## 2024-03-20 PROCEDURE — 76937 US GUIDE VASCULAR ACCESS: CPT | Mod: 26,COE,, | Performed by: ANESTHESIOLOGY

## 2024-03-20 PROCEDURE — 36620 INSERTION CATHETER ARTERY: CPT | Mod: 59,COE,, | Performed by: ANESTHESIOLOGY

## 2024-03-20 PROCEDURE — 0RG20K1 FUSION OF 2 OR MORE CERVICAL VERTEBRAL JOINTS WITH NONAUTOLOGOUS TISSUE SUBSTITUTE, POSTERIOR APPROACH, POSTERIOR COLUMN, OPEN APPROACH: ICD-10-PCS | Performed by: NEUROLOGICAL SURGERY

## 2024-03-20 PROCEDURE — 63600175 PHARM REV CODE 636 W HCPCS: Performed by: STUDENT IN AN ORGANIZED HEALTH CARE EDUCATION/TRAINING PROGRAM

## 2024-03-20 DEVICE — SCREW SYMPHONY PA 3.5X12MM: Type: IMPLANTABLE DEVICE | Site: POSTERIOR CERVICAL | Status: FUNCTIONAL

## 2024-03-20 DEVICE — SET SYMPHONY SCREW NS: Type: IMPLANTABLE DEVICE | Site: POSTERIOR CERVICAL | Status: FUNCTIONAL

## 2024-03-20 DEVICE — ROD SYMPHONY PRE-CUT 3.5X55MM: Type: IMPLANTABLE DEVICE | Site: POSTERIOR CERVICAL | Status: FUNCTIONAL

## 2024-03-20 RX ORDER — PHENYLEPHRINE HYDROCHLORIDE 10 MG/ML
INJECTION INTRAVENOUS CONTINUOUS PRN
Status: DISCONTINUED | OUTPATIENT
Start: 2024-03-20 | End: 2024-03-20

## 2024-03-20 RX ORDER — GABAPENTIN 400 MG/1
400 CAPSULE ORAL 3 TIMES DAILY
Status: DISCONTINUED | OUTPATIENT
Start: 2024-03-20 | End: 2024-03-23 | Stop reason: HOSPADM

## 2024-03-20 RX ORDER — METHOCARBAMOL 750 MG/1
750 TABLET, FILM COATED ORAL EVERY 8 HOURS PRN
Status: DISCONTINUED | OUTPATIENT
Start: 2024-03-20 | End: 2024-03-23 | Stop reason: HOSPADM

## 2024-03-20 RX ORDER — ONDANSETRON HYDROCHLORIDE 2 MG/ML
4 INJECTION, SOLUTION INTRAVENOUS EVERY 6 HOURS PRN
Status: DISCONTINUED | OUTPATIENT
Start: 2024-03-20 | End: 2024-03-23 | Stop reason: HOSPADM

## 2024-03-20 RX ORDER — OXYCODONE HYDROCHLORIDE 10 MG/1
10 TABLET ORAL EVERY 6 HOURS PRN
Status: DISCONTINUED | OUTPATIENT
Start: 2024-03-20 | End: 2024-03-23 | Stop reason: HOSPADM

## 2024-03-20 RX ORDER — IBUPROFEN 200 MG
24 TABLET ORAL
Status: DISCONTINUED | OUTPATIENT
Start: 2024-03-20 | End: 2024-03-23 | Stop reason: HOSPADM

## 2024-03-20 RX ORDER — AMLODIPINE BESYLATE 2.5 MG/1
2.5 TABLET ORAL DAILY
Status: DISCONTINUED | OUTPATIENT
Start: 2024-03-21 | End: 2024-03-23 | Stop reason: HOSPADM

## 2024-03-20 RX ORDER — PROPOFOL 10 MG/ML
VIAL (ML) INTRAVENOUS
Status: DISCONTINUED | OUTPATIENT
Start: 2024-03-20 | End: 2024-03-20

## 2024-03-20 RX ORDER — POLYETHYLENE GLYCOL 3350 17 G/17G
17 POWDER, FOR SOLUTION ORAL DAILY
Status: DISCONTINUED | OUTPATIENT
Start: 2024-03-21 | End: 2024-03-23 | Stop reason: HOSPADM

## 2024-03-20 RX ORDER — ESCITALOPRAM OXALATE 20 MG/1
20 TABLET ORAL DAILY
Status: DISCONTINUED | OUTPATIENT
Start: 2024-03-21 | End: 2024-03-23 | Stop reason: HOSPADM

## 2024-03-20 RX ORDER — IBUPROFEN 200 MG
16 TABLET ORAL
Status: DISCONTINUED | OUTPATIENT
Start: 2024-03-20 | End: 2024-03-23 | Stop reason: HOSPADM

## 2024-03-20 RX ORDER — CEFAZOLIN 2 G/1
INJECTION, POWDER, FOR SOLUTION INTRAMUSCULAR; INTRAVENOUS
Status: DISCONTINUED | OUTPATIENT
Start: 2024-03-20 | End: 2024-03-20

## 2024-03-20 RX ORDER — MIDAZOLAM HYDROCHLORIDE 1 MG/ML
INJECTION, SOLUTION INTRAMUSCULAR; INTRAVENOUS
Status: DISCONTINUED | OUTPATIENT
Start: 2024-03-20 | End: 2024-03-20

## 2024-03-20 RX ORDER — DIAZEPAM 5 MG/1
5 TABLET ORAL EVERY 8 HOURS
Status: DISCONTINUED | OUTPATIENT
Start: 2024-03-20 | End: 2024-03-23 | Stop reason: HOSPADM

## 2024-03-20 RX ORDER — HALOPERIDOL 5 MG/ML
0.5 INJECTION INTRAMUSCULAR EVERY 10 MIN PRN
Status: DISCONTINUED | OUTPATIENT
Start: 2024-03-20 | End: 2024-03-20 | Stop reason: HOSPADM

## 2024-03-20 RX ORDER — SODIUM CHLORIDE 9 MG/ML
INJECTION, SOLUTION INTRAVENOUS CONTINUOUS
Status: ACTIVE | OUTPATIENT
Start: 2024-03-20 | End: 2024-03-21

## 2024-03-20 RX ORDER — NICARDIPINE HYDROCHLORIDE 2.5 MG/ML
INJECTION INTRAVENOUS
Status: DISCONTINUED | OUTPATIENT
Start: 2024-03-20 | End: 2024-03-20

## 2024-03-20 RX ORDER — LIDOCAINE HYDROCHLORIDE AND EPINEPHRINE 10; 10 MG/ML; UG/ML
INJECTION, SOLUTION INFILTRATION; PERINEURAL
Status: DISCONTINUED | OUTPATIENT
Start: 2024-03-20 | End: 2024-03-20 | Stop reason: HOSPADM

## 2024-03-20 RX ORDER — HYDROMORPHONE HYDROCHLORIDE 1 MG/ML
0.2 INJECTION, SOLUTION INTRAMUSCULAR; INTRAVENOUS; SUBCUTANEOUS EVERY 5 MIN PRN
Status: DISCONTINUED | OUTPATIENT
Start: 2024-03-20 | End: 2024-03-20 | Stop reason: HOSPADM

## 2024-03-20 RX ORDER — ROCURONIUM BROMIDE 10 MG/ML
INJECTION, SOLUTION INTRAVENOUS
Status: DISCONTINUED | OUTPATIENT
Start: 2024-03-20 | End: 2024-03-20

## 2024-03-20 RX ORDER — TALC
6 POWDER (GRAM) TOPICAL NIGHTLY PRN
Status: DISCONTINUED | OUTPATIENT
Start: 2024-03-20 | End: 2024-03-23 | Stop reason: HOSPADM

## 2024-03-20 RX ORDER — SUCCINYLCHOLINE CHLORIDE 20 MG/ML
INJECTION INTRAMUSCULAR; INTRAVENOUS
Status: DISCONTINUED | OUTPATIENT
Start: 2024-03-20 | End: 2024-03-20

## 2024-03-20 RX ORDER — BISACODYL 10 MG/1
10 SUPPOSITORY RECTAL DAILY PRN
Status: DISCONTINUED | OUTPATIENT
Start: 2024-03-20 | End: 2024-03-23 | Stop reason: HOSPADM

## 2024-03-20 RX ORDER — LIDOCAINE HYDROCHLORIDE 20 MG/ML
INJECTION, SOLUTION EPIDURAL; INFILTRATION; INTRACAUDAL; PERINEURAL
Status: DISCONTINUED | OUTPATIENT
Start: 2024-03-20 | End: 2024-03-20

## 2024-03-20 RX ORDER — MUPIROCIN 20 MG/G
1 OINTMENT TOPICAL 2 TIMES DAILY
Status: DISCONTINUED | OUTPATIENT
Start: 2024-03-20 | End: 2024-03-20 | Stop reason: HOSPADM

## 2024-03-20 RX ORDER — ONDANSETRON HYDROCHLORIDE 2 MG/ML
INJECTION, SOLUTION INTRAVENOUS
Status: DISCONTINUED | OUTPATIENT
Start: 2024-03-20 | End: 2024-03-20

## 2024-03-20 RX ORDER — HYDROMORPHONE HYDROCHLORIDE 1 MG/ML
1 INJECTION, SOLUTION INTRAMUSCULAR; INTRAVENOUS; SUBCUTANEOUS
Status: DISCONTINUED | OUTPATIENT
Start: 2024-03-20 | End: 2024-03-23 | Stop reason: HOSPADM

## 2024-03-20 RX ORDER — FAMOTIDINE 10 MG/ML
INJECTION INTRAVENOUS
Status: DISCONTINUED | OUTPATIENT
Start: 2024-03-20 | End: 2024-03-20

## 2024-03-20 RX ORDER — DEXAMETHASONE SODIUM PHOSPHATE 4 MG/ML
INJECTION, SOLUTION INTRA-ARTICULAR; INTRALESIONAL; INTRAMUSCULAR; INTRAVENOUS; SOFT TISSUE
Status: DISCONTINUED | OUTPATIENT
Start: 2024-03-20 | End: 2024-03-20

## 2024-03-20 RX ORDER — OXYCODONE HYDROCHLORIDE 5 MG/1
5 TABLET ORAL EVERY 4 HOURS PRN
Status: DISCONTINUED | OUTPATIENT
Start: 2024-03-20 | End: 2024-03-23 | Stop reason: HOSPADM

## 2024-03-20 RX ORDER — LABETALOL HCL 20 MG/4 ML
10 SYRINGE (ML) INTRAVENOUS EVERY 10 MIN PRN
Status: DISCONTINUED | OUTPATIENT
Start: 2024-03-20 | End: 2024-03-23 | Stop reason: HOSPADM

## 2024-03-20 RX ORDER — FENTANYL CITRATE 50 UG/ML
INJECTION, SOLUTION INTRAMUSCULAR; INTRAVENOUS
Status: DISCONTINUED | OUTPATIENT
Start: 2024-03-20 | End: 2024-03-20

## 2024-03-20 RX ORDER — DEXMEDETOMIDINE HYDROCHLORIDE 100 UG/ML
INJECTION, SOLUTION INTRAVENOUS
Status: DISCONTINUED | OUTPATIENT
Start: 2024-03-20 | End: 2024-03-20

## 2024-03-20 RX ORDER — OXYCODONE HYDROCHLORIDE 5 MG/1
5 TABLET ORAL
Status: DISCONTINUED | OUTPATIENT
Start: 2024-03-20 | End: 2024-03-20 | Stop reason: HOSPADM

## 2024-03-20 RX ORDER — LOSARTAN POTASSIUM AND HYDROCHLOROTHIAZIDE 12.5; 5 MG/1; MG/1
1 TABLET ORAL DAILY
Status: DISCONTINUED | OUTPATIENT
Start: 2024-03-21 | End: 2024-03-23 | Stop reason: HOSPADM

## 2024-03-20 RX ORDER — MUPIROCIN 20 MG/G
OINTMENT TOPICAL
Status: DISCONTINUED | OUTPATIENT
Start: 2024-03-20 | End: 2024-03-20 | Stop reason: HOSPADM

## 2024-03-20 RX ORDER — ACETAMINOPHEN 325 MG/1
650 TABLET ORAL EVERY 4 HOURS PRN
Status: DISCONTINUED | OUTPATIENT
Start: 2024-03-20 | End: 2024-03-23 | Stop reason: HOSPADM

## 2024-03-20 RX ADMIN — HALOPERIDOL LACTATE 0.5 MG: 5 INJECTION, SOLUTION INTRAMUSCULAR at 09:03

## 2024-03-20 RX ADMIN — NICARDIPINE HYDROCHLORIDE 0.4 MG: 25 INJECTION, SOLUTION INTRAVENOUS at 08:03

## 2024-03-20 RX ADMIN — FENTANYL CITRATE 75 MCG: 50 INJECTION INTRAMUSCULAR; INTRAVENOUS at 06:03

## 2024-03-20 RX ADMIN — PROPOFOL 150 MCG/KG/MIN: 10 INJECTION, EMULSION INTRAVENOUS at 06:03

## 2024-03-20 RX ADMIN — SODIUM CHLORIDE 0.2 MCG/KG/MIN: 9 INJECTION, SOLUTION INTRAVENOUS at 06:03

## 2024-03-20 RX ADMIN — DIAZEPAM 5 MG: 5 TABLET ORAL at 09:03

## 2024-03-20 RX ADMIN — NICARDIPINE HYDROCHLORIDE 0.2 MG: 25 INJECTION, SOLUTION INTRAVENOUS at 08:03

## 2024-03-20 RX ADMIN — HYDROMORPHONE HYDROCHLORIDE 0.2 MG: 1 INJECTION, SOLUTION INTRAMUSCULAR; INTRAVENOUS; SUBCUTANEOUS at 08:03

## 2024-03-20 RX ADMIN — GABAPENTIN 400 MG: 300 CAPSULE ORAL at 08:03

## 2024-03-20 RX ADMIN — PROPOFOL 60 MG: 10 INJECTION, EMULSION INTRAVENOUS at 06:03

## 2024-03-20 RX ADMIN — ROCURONIUM BROMIDE 10 MG: 10 INJECTION, SOLUTION INTRAVENOUS at 06:03

## 2024-03-20 RX ADMIN — MUPIROCIN 1 G: 20 OINTMENT TOPICAL at 08:03

## 2024-03-20 RX ADMIN — ONDANSETRON 4 MG: 2 INJECTION INTRAMUSCULAR; INTRAVENOUS at 08:03

## 2024-03-20 RX ADMIN — GLYCOPYRROLATE 0.2 MG: 0.2 INJECTION INTRAMUSCULAR; INTRAVENOUS at 05:03

## 2024-03-20 RX ADMIN — SODIUM CHLORIDE, SODIUM GLUCONATE, SODIUM ACETATE, POTASSIUM CHLORIDE, MAGNESIUM CHLORIDE, SODIUM PHOSPHATE, DIBASIC, AND POTASSIUM PHOSPHATE: .53; .5; .37; .037; .03; .012; .00082 INJECTION, SOLUTION INTRAVENOUS at 07:03

## 2024-03-20 RX ADMIN — SUCCINYLCHOLINE CHLORIDE 200 MG: 20 INJECTION, SOLUTION INTRAMUSCULAR; INTRAVENOUS; PARENTERAL at 06:03

## 2024-03-20 RX ADMIN — CEFAZOLIN 2 G: 2 INJECTION, POWDER, FOR SOLUTION INTRAMUSCULAR; INTRAVENOUS at 06:03

## 2024-03-20 RX ADMIN — SODIUM CHLORIDE: 9 INJECTION, SOLUTION INTRAVENOUS at 11:03

## 2024-03-20 RX ADMIN — SUGAMMADEX 200 MG: 100 INJECTION, SOLUTION INTRAVENOUS at 08:03

## 2024-03-20 RX ADMIN — DEXTROSE MONOHYDRATE 1 G: 2.5 INJECTION INTRAVENOUS at 11:03

## 2024-03-20 RX ADMIN — SODIUM CHLORIDE: 9 INJECTION, SOLUTION INTRAVENOUS at 03:03

## 2024-03-20 RX ADMIN — MIDAZOLAM 2 MG: 1 INJECTION INTRAMUSCULAR; INTRAVENOUS at 05:03

## 2024-03-20 RX ADMIN — DEXAMETHASONE SODIUM PHOSPHATE 8 MG: 4 INJECTION INTRA-ARTICULAR; INTRALESIONAL; INTRAMUSCULAR; INTRAVENOUS; SOFT TISSUE at 06:03

## 2024-03-20 RX ADMIN — DEXMEDETOMIDINE 12 MCG: 100 INJECTION, SOLUTION, CONCENTRATE INTRAVENOUS at 08:03

## 2024-03-20 RX ADMIN — HYDROMORPHONE HYDROCHLORIDE 0.2 MG: 1 INJECTION, SOLUTION INTRAMUSCULAR; INTRAVENOUS; SUBCUTANEOUS at 09:03

## 2024-03-20 RX ADMIN — FENTANYL CITRATE 50 MCG: 50 INJECTION INTRAMUSCULAR; INTRAVENOUS at 08:03

## 2024-03-20 RX ADMIN — DEXMEDETOMIDINE 8 MCG: 100 INJECTION, SOLUTION, CONCENTRATE INTRAVENOUS at 08:03

## 2024-03-20 RX ADMIN — PHENYLEPHRINE HYDROCHLORIDE 0.1 MCG/KG/MIN: 10 INJECTION INTRAVENOUS at 06:03

## 2024-03-20 RX ADMIN — METHOCARBAMOL 750 MG: 750 TABLET ORAL at 08:03

## 2024-03-20 RX ADMIN — OXYCODONE HYDROCHLORIDE 10 MG: 10 TABLET ORAL at 08:03

## 2024-03-20 RX ADMIN — LIDOCAINE HYDROCHLORIDE 80 MG: 20 INJECTION, SOLUTION EPIDURAL; INFILTRATION; INTRACAUDAL; PERINEURAL at 06:03

## 2024-03-20 RX ADMIN — PROPOFOL 140 MG: 10 INJECTION, EMULSION INTRAVENOUS at 06:03

## 2024-03-20 RX ADMIN — ROCURONIUM BROMIDE 40 MG: 10 INJECTION, SOLUTION INTRAVENOUS at 06:03

## 2024-03-20 RX ADMIN — SODIUM CHLORIDE: 0.9 INJECTION, SOLUTION INTRAVENOUS at 03:03

## 2024-03-20 RX ADMIN — MUPIROCIN: 20 OINTMENT TOPICAL at 03:03

## 2024-03-20 RX ADMIN — FAMOTIDINE 20 MG: 10 INJECTION, SOLUTION INTRAVENOUS at 05:03

## 2024-03-20 NOTE — ANESTHESIA PROCEDURE NOTES
Arterial    Diagnosis: Cervical myelopathy    Patient location during procedure: done in OR  Timeout: 3/20/2024 6:09 PM  Procedure end time: 3/20/2024 6:11 PM    Staffing  Authorizing Provider: Kota Riggs MD  Performing Provider: Kota Riggs MD    Staffing  Performed by: Kota Riggs MD  Authorized by: Kota Riggs MD    Anesthesiologist was present at the time of the procedure.    Preanesthetic Checklist  Completed: patient identified, IV checked, site marked, risks and benefits discussed, surgical consent, monitors and equipment checked, pre-op evaluation, timeout performed and anesthesia consent givenArterial  Skin Prep: chlorhexidine gluconate  Local Infiltration: none  Orientation: left  Location: radial    Catheter Size: 20 G  Catheter placement by Ultrasound guidance. Heme positive aspiration all ports.   Vessel Caliber: small, medium, patent, compressibility normal  Vascular Doppler:  not done  Needle advanced into vessel with real time Ultrasound guidance.  Guidewire confirmed in vessel.  Sterile sheath used.  Image recorded and saved.Insertion Attempts: 1  Assessment  Dressing: secured with tape and tegaderm  Patient: Tolerated well

## 2024-03-21 ENCOUNTER — TELEPHONE (OUTPATIENT)
Dept: NEUROSURGERY | Facility: CLINIC | Age: 54
End: 2024-03-21
Payer: COMMERCIAL

## 2024-03-21 PROBLEM — E66.09 CLASS 2 OBESITY DUE TO EXCESS CALORIES WITH BODY MASS INDEX (BMI) OF 38.0 TO 38.9 IN ADULT: Status: ACTIVE | Noted: 2024-02-20

## 2024-03-21 PROBLEM — M47.12 CERVICAL SPONDYLOSIS WITH MYELOPATHY: Status: ACTIVE | Noted: 2024-03-21

## 2024-03-21 PROBLEM — E66.812 CLASS 2 OBESITY DUE TO EXCESS CALORIES WITH BODY MASS INDEX (BMI) OF 38.0 TO 38.9 IN ADULT: Status: ACTIVE | Noted: 2024-02-20

## 2024-03-21 LAB
ANION GAP SERPL CALC-SCNC: 6 MMOL/L (ref 8–16)
BASOPHILS # BLD AUTO: 0.01 K/UL (ref 0–0.2)
BASOPHILS NFR BLD: 0.1 % (ref 0–1.9)
BUN SERPL-MCNC: 9 MG/DL (ref 6–20)
CALCIUM SERPL-MCNC: 8.6 MG/DL (ref 8.7–10.5)
CHLORIDE SERPL-SCNC: 108 MMOL/L (ref 95–110)
CO2 SERPL-SCNC: 23 MMOL/L (ref 23–29)
CREAT SERPL-MCNC: 0.6 MG/DL (ref 0.5–1.4)
DIFFERENTIAL METHOD BLD: ABNORMAL
EOSINOPHIL # BLD AUTO: 0 K/UL (ref 0–0.5)
EOSINOPHIL NFR BLD: 0 % (ref 0–8)
ERYTHROCYTE [DISTWIDTH] IN BLOOD BY AUTOMATED COUNT: 13.3 % (ref 11.5–14.5)
EST. GFR  (NO RACE VARIABLE): >60 ML/MIN/1.73 M^2
GLUCOSE SERPL-MCNC: 132 MG/DL (ref 70–110)
HCT VFR BLD AUTO: 39.9 % (ref 37–48.5)
HGB BLD-MCNC: 13.3 G/DL (ref 12–16)
IMM GRANULOCYTES # BLD AUTO: 0.05 K/UL (ref 0–0.04)
IMM GRANULOCYTES NFR BLD AUTO: 0.6 % (ref 0–0.5)
LYMPHOCYTES # BLD AUTO: 0.8 K/UL (ref 1–4.8)
LYMPHOCYTES NFR BLD: 9 % (ref 18–48)
MCH RBC QN AUTO: 30 PG (ref 27–31)
MCHC RBC AUTO-ENTMCNC: 33.3 G/DL (ref 32–36)
MCV RBC AUTO: 90 FL (ref 82–98)
MONOCYTES # BLD AUTO: 0.2 K/UL (ref 0.3–1)
MONOCYTES NFR BLD: 2.4 % (ref 4–15)
NEUTROPHILS # BLD AUTO: 8 K/UL (ref 1.8–7.7)
NEUTROPHILS NFR BLD: 87.9 % (ref 38–73)
NRBC BLD-RTO: 0 /100 WBC
PLATELET # BLD AUTO: 359 K/UL (ref 150–450)
PMV BLD AUTO: 9.1 FL (ref 9.2–12.9)
POTASSIUM SERPL-SCNC: 4.5 MMOL/L (ref 3.5–5.1)
RBC # BLD AUTO: 4.43 M/UL (ref 4–5.4)
SODIUM SERPL-SCNC: 137 MMOL/L (ref 136–145)
WBC # BLD AUTO: 9.09 K/UL (ref 3.9–12.7)

## 2024-03-21 PROCEDURE — 63600175 PHARM REV CODE 636 W HCPCS: Performed by: PHYSICIAN ASSISTANT

## 2024-03-21 PROCEDURE — 97161 PT EVAL LOW COMPLEX 20 MIN: CPT

## 2024-03-21 PROCEDURE — 11000001 HC ACUTE MED/SURG PRIVATE ROOM

## 2024-03-21 PROCEDURE — 97530 THERAPEUTIC ACTIVITIES: CPT

## 2024-03-21 PROCEDURE — 25000003 PHARM REV CODE 250: Performed by: STUDENT IN AN ORGANIZED HEALTH CARE EDUCATION/TRAINING PROGRAM

## 2024-03-21 PROCEDURE — 85025 COMPLETE CBC W/AUTO DIFF WBC: CPT | Performed by: STUDENT IN AN ORGANIZED HEALTH CARE EDUCATION/TRAINING PROGRAM

## 2024-03-21 PROCEDURE — 36415 COLL VENOUS BLD VENIPUNCTURE: CPT | Performed by: STUDENT IN AN ORGANIZED HEALTH CARE EDUCATION/TRAINING PROGRAM

## 2024-03-21 PROCEDURE — 97116 GAIT TRAINING THERAPY: CPT

## 2024-03-21 PROCEDURE — 97165 OT EVAL LOW COMPLEX 30 MIN: CPT

## 2024-03-21 PROCEDURE — 80048 BASIC METABOLIC PNL TOTAL CA: CPT | Performed by: STUDENT IN AN ORGANIZED HEALTH CARE EDUCATION/TRAINING PROGRAM

## 2024-03-21 PROCEDURE — 99024 POSTOP FOLLOW-UP VISIT: CPT | Mod: COE,,, | Performed by: PHYSICIAN ASSISTANT

## 2024-03-21 PROCEDURE — 63600175 PHARM REV CODE 636 W HCPCS: Performed by: STUDENT IN AN ORGANIZED HEALTH CARE EDUCATION/TRAINING PROGRAM

## 2024-03-21 RX ORDER — ENOXAPARIN SODIUM 100 MG/ML
40 INJECTION SUBCUTANEOUS EVERY 24 HOURS
Status: DISCONTINUED | OUTPATIENT
Start: 2024-03-21 | End: 2024-03-23 | Stop reason: HOSPADM

## 2024-03-21 RX ADMIN — METHOCARBAMOL 750 MG: 750 TABLET ORAL at 12:03

## 2024-03-21 RX ADMIN — DIAZEPAM 5 MG: 5 TABLET ORAL at 03:03

## 2024-03-21 RX ADMIN — DEXTROSE MONOHYDRATE 1 G: 2.5 INJECTION INTRAVENOUS at 03:03

## 2024-03-21 RX ADMIN — AMLODIPINE BESYLATE 2.5 MG: 2.5 TABLET ORAL at 10:03

## 2024-03-21 RX ADMIN — ACETAMINOPHEN 650 MG: 325 TABLET ORAL at 12:03

## 2024-03-21 RX ADMIN — ENOXAPARIN SODIUM 40 MG: 40 INJECTION SUBCUTANEOUS at 03:03

## 2024-03-21 RX ADMIN — GABAPENTIN 400 MG: 300 CAPSULE ORAL at 08:03

## 2024-03-21 RX ADMIN — LOSARTAN POTASSIUM AND HYDROCHLOROTHIAZIDE 1 TABLET: 50; 12.5 TABLET, FILM COATED ORAL at 10:03

## 2024-03-21 RX ADMIN — DEXTROSE MONOHYDRATE 1 G: 2.5 INJECTION INTRAVENOUS at 11:03

## 2024-03-21 RX ADMIN — OXYCODONE HYDROCHLORIDE 10 MG: 10 TABLET ORAL at 06:03

## 2024-03-21 RX ADMIN — POLYETHYLENE GLYCOL 3350 17 G: 17 POWDER, FOR SOLUTION ORAL at 10:03

## 2024-03-21 RX ADMIN — OXYCODONE 5 MG: 5 TABLET ORAL at 05:03

## 2024-03-21 RX ADMIN — DIAZEPAM 5 MG: 5 TABLET ORAL at 05:03

## 2024-03-21 RX ADMIN — GABAPENTIN 400 MG: 300 CAPSULE ORAL at 03:03

## 2024-03-21 RX ADMIN — ESCITALOPRAM OXALATE 20 MG: 20 TABLET ORAL at 10:03

## 2024-03-21 RX ADMIN — DIAZEPAM 5 MG: 5 TABLET ORAL at 09:03

## 2024-03-21 RX ADMIN — DEXTROSE MONOHYDRATE 1 G: 2.5 INJECTION INTRAVENOUS at 09:03

## 2024-03-21 RX ADMIN — OXYCODONE HYDROCHLORIDE 10 MG: 10 TABLET ORAL at 08:03

## 2024-03-21 RX ADMIN — GABAPENTIN 400 MG: 300 CAPSULE ORAL at 10:03

## 2024-03-21 NOTE — PROGRESS NOTES
[facility] - Surgery  Neurosurgery  Progress Note    Subjective:     History of Present Illness: Ina Blair is a 53 y.o.  female with history of hydrocephalus status post shunt and prior L4-5 TLIF who presented with low back pain as well as bilateral radicular thigh pain which progressively getting worse.  Back pain is aggravated with standing sitting or walking.  Pain got better when she sat down and rested.  She can not walk more than 1 block without pain.  Leaning forward help with pain.  Pain radiates to the anterior aspect of both thighs.  At the knees.  She also reports some pain that radiates to the buttock to the posterior aspect of her thighs especially when she walks for long distances.  She reports some urinary incontinence.  Upon further questioning she also reports some difficulty ambulating lately with unsteady gait as well as she has been dropping some objects.  She failed conservative management including multimodal pain medications, injections, and physical therapy.     Post-Op Info:  Procedure(s) (LRB):  DECOMPRESSION, SPINE, CERVICAL, POSTERIOR APPROACH (N/A)   1 Day Post-Op   Interval History:  NAEON. Afvss. C/o neck pain. Ambulated with therapy. Voiding spontaneously. Exam stable. Continue HV drain.    Medications:  Continuous Infusions:  Scheduled Meds:   amLODIPine  2.5 mg Oral Daily    ceFAZolin (Ancef) IV (PEDS and ADULTS)  1 g Intravenous Q8H    diazePAM  5 mg Oral Q8H    enoxparin  40 mg Subcutaneous Q24H (prophylaxis, 1700)    EScitalopram oxalate  20 mg Oral Daily    gabapentin  400 mg Oral TID    losartan-hydrochlorothiazide 50-12.5 mg  1 tablet Oral Daily    polyethylene glycol  17 g Oral Daily     PRN Meds:acetaminophen, bisacodyL, dextrose 10%, dextrose 10%, glucose, glucose, HYDROmorphone, labetalol, melatonin, methocarbamoL, ondansetron, oxyCODONE, oxyCODONE     Review of Systems  Objective:     Weight: 102.6 kg (226 lb 3.1 oz)  Body mass index is 38.81 kg/m².  Vital  Signs (Most Recent):  Temp: 97.7 °F (36.5 °C) (03/21/24 1614)  Pulse: 62 (03/21/24 1614)  Resp: 18 (03/21/24 1614)  BP: (!) 152/75 (03/21/24 1614)  SpO2: 95 % (03/21/24 1614) Vital Signs (24h Range):  Temp:  [96 °F (35.6 °C)-98.5 °F (36.9 °C)] 97.7 °F (36.5 °C)  Pulse:  [57-80] 62  Resp:  [13-20] 18  SpO2:  [92 %-99 %] 95 %  BP: (117-171)/(56-87) 152/75     Date 03/21/24 0700 - 03/22/24 0659   Shift 4168-2698 9965-9201 6455-9166 24 Hour Total   INTAKE   P.O. 380   380   Shift Total(mL/kg) 380(3.7)   380(3.7)   OUTPUT   Shift Total(mL/kg)       Weight (kg) 102.6 102.6 102.6 102.6                            Closed/Suction Drain 03/20/24 1932 Tube - 1 Posterior Neck Accordion 10 Fr. (Active)   Site Description Healing 03/20/24 2245   Dressing Type Other (Comment) 03/20/24 2245   Dressing Status Clean;Dry;Intact 03/20/24 2245   Dressing Intervention First dressing 03/20/24 2245   Drainage Sanguineous 03/20/24 2245   Status Other (Comment) 03/20/24 2245   Output (mL) 100 mL 03/21/24 0515          Physical Exam         Neurosurgery Physical Exam    General: well developed, well nourished, no distress.   Head: normocephalic, atraumatic  Neurologic: Alert and oriented. Thought content appropriate.  GCS: Motor: 6/Verbal: 5/Eyes: 4 GCS Total: 15  Mental Status: Awake, Alert, Oriented x 4  Language: No aphasia  Speech: No dysarthria  Cranial nerves: face symmetric, tongue midline, CN II-XII grossly intact.   Eyes: pupils equal, round, reactive to light with accomodation, EOMI.   Pulmonary: normal respirations, no signs of respiratory distress  Abdomen: soft, non-distended, not tender to palpation  Sensory: intact to light touch throughout  Motor Strength: Moves all extremities spontaneously with good tone.  Full strength upper and lower extremities. No abnormal movements seen.     Strength  Deltoids Triceps Biceps Wrist Extension Wrist Flexion Hand    Upper: R 5/5 5/5 5/5 5/5 5/5 5/5    L 5/5 5/5 5/5 5/5 5/5 5/5      "Iliopsoas Quadriceps Knee  Flexion Tibialis  anterior Gastro- cnemius EHL   Lower: R 5/5 5/5 5/5 5/5 5/5 5/5    L 5/5 5/5 5/5 5/5 5/5 5/5     Skin: Skin is warm, dry and intact.  Incision covered with dressing, drain intact.       Significant Labs:  Recent Labs   Lab 03/21/24  0515   *      K 4.5      CO2 23   BUN 9   CREATININE 0.6   CALCIUM 8.6*     Recent Labs   Lab 03/21/24  0515   WBC 9.09   HGB 13.3   HCT 39.9        No results for input(s): "LABPT", "INR", "APTT" in the last 48 hours.  Microbiology Results (last 7 days)       ** No results found for the last 168 hours. **          Recent Lab Results         03/21/24  0515        Anion Gap 6       Baso # 0.01       Basophil % 0.1       BUN 9       Calcium 8.6       Chloride 108       CO2 23       Creatinine 0.6       Differential Method Automated       eGFR >60.0       Eos # 0.0       Eos % 0.0       Glucose 132       Gran # (ANC) 8.0       Gran % 87.9       Hematocrit 39.9       Hemoglobin 13.3       Immature Grans (Abs) 0.05  Comment: Mild elevation in immature granulocytes is non specific and   can be seen in a variety of conditions including stress response,   acute inflammation, trauma and pregnancy. Correlation with other   laboratory and clinical findings is essential.         Immature Granulocytes 0.6       Lymph # 0.8       Lymph % 9.0       MCH 30.0       MCHC 33.3       MCV 90       Mono # 0.2       Mono % 2.4       MPV 9.1       nRBC 0       Platelet Count 359       Potassium 4.5       RBC 4.43       RDW 13.3       Sodium 137       WBC 9.09             All pertinent labs from the last 24 hours have been reviewed.    Significant Diagnostics:  I have reviewed all pertinent imaging results/findings within the past 24 hours.  Assessment/Plan:     * Cervical spondylosis with myelopathy  Ina Blair is a 53-year-old female with hydrocephalus status post  shunt (Codman Hakim set at 100) and lumbar stenosis with " lumbar radiculopathy status post L4-5 TLIF who presented with low back pain and bilateral L3 radiculopathy right-sided more than left.  Imaging shows adjacent level disease at L3-4. She also has worsening myelopathy. Now s/p C3-6 PCF.    - Neurologically stable  - Pain control: continue current regimen   - Continue dressing to incision. Discussed proper wound care.   - Drains: continue HV drain.   - Post op imaging pending.   - Brace: collar when upright/OOB  - Continue PT/OT/OOB. OOB > 6hrs/day  - GI: Diet as tolerated. Continue bowel regimen.   - Voiding spontaneously  - DVT ppx: LVX, Compression stockings, SCDs  - Atelectasis ppx: IS at bedside. Encourage use every hour while awake.  - Discussed with Dr. Meek    - Dispo: Pending drain removal      Primary hypertension  Chronic, controlled. Latest blood pressure and vitals reviewed-     Temp:  [96 °F (35.6 °C)-98.5 °F (36.9 °C)]   Pulse:  [57-80]   Resp:  [13-20]   BP: (117-171)/(56-87)   SpO2:  [92 %-99 %] .   Home meds for hypertension were reviewed and noted below.   Hypertension Medications               amLODIPine (NORVASC) 2.5 MG tablet TAKE ONE TABLET BY MOUTH DAILY - MAY TAKE 2 TABLETS IF BLOOD PRESSURE IS OVER 160    losartan-hydrochlorothiazide 50-12.5 mg (HYZAAR) 50-12.5 mg per tablet Take 1 tablet by mouth once daily.            While in the hospital, will manage blood pressure as follows; Continue home antihypertensive regimen    Will utilize p.r.n. blood pressure medication only if patient's blood pressure greater than 180/110 and she develops symptoms such as worsening chest pain or shortness of breath.        Jossy Tracy PA-C  Neurosurgery  [facility] - Surgery

## 2024-03-21 NOTE — NURSING
Nurses Note -- 4 Eyes      3/20/2024   10:35 PM      Skin assessed during: Admit      [x] No Altered Skin Integrity Present    []Prevention Measures Documented      [] Yes- Altered Skin Integrity Present or Discovered   [] LDA Added if Not in Epic (Describe Wound)   [] New Altered Skin Integrity was Present on Admit and Documented in LDA   [] Wound Image Taken    Wound Care Consulted? No    Attending Nurse: Tess LUTZ.    Second RN/Staff Member: Rabia LUTZ.    Received patient on admission coming from PACU, cervical incision dressing clean, dry and intact, Hemovac drain with sanguineous drainage. Provided meal, family member at the bedside.

## 2024-03-21 NOTE — ASSESSMENT & PLAN NOTE
Chronic, controlled. Latest blood pressure and vitals reviewed-     Temp:  [96 °F (35.6 °C)-98.5 °F (36.9 °C)]   Pulse:  [57-80]   Resp:  [13-20]   BP: (117-171)/(56-87)   SpO2:  [92 %-99 %] .   Home meds for hypertension were reviewed and noted below.   Hypertension Medications               amLODIPine (NORVASC) 2.5 MG tablet TAKE ONE TABLET BY MOUTH DAILY - MAY TAKE 2 TABLETS IF BLOOD PRESSURE IS OVER 160    losartan-hydrochlorothiazide 50-12.5 mg (HYZAAR) 50-12.5 mg per tablet Take 1 tablet by mouth once daily.            While in the hospital, will manage blood pressure as follows; Continue home antihypertensive regimen    Will utilize p.r.n. blood pressure medication only if patient's blood pressure greater than 180/110 and she develops symptoms such as worsening chest pain or shortness of breath.

## 2024-03-21 NOTE — HPI
Ina Blair is a 53 y.o.  female with history of hydrocephalus status post shunt and prior L4-5 TLIF who presented with low back pain as well as bilateral radicular thigh pain which progressively getting worse.  Back pain is aggravated with standing sitting or walking.  Pain got better when she sat down and rested.  She can not walk more than 1 block without pain.  Leaning forward help with pain.  Pain radiates to the anterior aspect of both thighs.  At the knees.  She also reports some pain that radiates to the buttock to the posterior aspect of her thighs especially when she walks for long distances.  She reports some urinary incontinence.  Upon further questioning she also reports some difficulty ambulating lately with unsteady gait as well as she has been dropping some objects.  She failed conservative management including multimodal pain medications, injections, and physical therapy.

## 2024-03-21 NOTE — NURSING TRANSFER
Nursing Transfer Note      3/20/2024   10:14 PM    Nurse giving handoff:BOLIVAR Foote  Nurse receiving handoff:BOLIVAR Gusman    Reason patient is being transferred: s/p cervical decompression    Transfer To: 525    Transfer via bed    Transfer with     Transported by RN    Transfer Vital Signs:SEE FLOWSHEET  Order for Tele Monitor? No    Any special needs or follow-up needed: routine    Chart send with patient: Yes    Notified: spouse    Patient reassessed at: 3/20/24 @ 6402

## 2024-03-21 NOTE — TRANSFER OF CARE
"Anesthesia Transfer of Care Note    Patient: Ina Blair    Procedure(s) Performed: Procedure(s) (LRB):  DECOMPRESSION, SPINE, CERVICAL, POSTERIOR APPROACH (N/A)    Patient location: PACU    Anesthesia Type: general    Transport from OR: Transported from OR on 6-10 L/min O2 by face mask with adequate spontaneous ventilation    Post pain: pain needs to be addressed    Post assessment: no apparent anesthetic complications    Post vital signs: stable    Level of consciousness: awake and alert    Nausea/Vomiting: no nausea/vomiting    Complications: none    Transfer of care protocol was followed      Last vitals: Visit Vitals  /65 (BP Location: Right arm, Patient Position: Lying)   Pulse (!) 55   Temp 36.6 °C (97.8 °F) (Oral)   Resp 18   Ht 5' 4" (1.626 m)   Wt 102.1 kg (225 lb)   SpO2 97%   Breastfeeding No   BMI 38.62 kg/m²     "

## 2024-03-21 NOTE — SUBJECTIVE & OBJECTIVE
Interval History:  NAEON. Afvss. C/o neck pain. Ambulated with therapy. Voiding spontaneously. Exam stable. Continue HV drain.    Medications:  Continuous Infusions:  Scheduled Meds:   amLODIPine  2.5 mg Oral Daily    ceFAZolin (Ancef) IV (PEDS and ADULTS)  1 g Intravenous Q8H    diazePAM  5 mg Oral Q8H    enoxparin  40 mg Subcutaneous Q24H (prophylaxis, 1700)    EScitalopram oxalate  20 mg Oral Daily    gabapentin  400 mg Oral TID    losartan-hydrochlorothiazide 50-12.5 mg  1 tablet Oral Daily    polyethylene glycol  17 g Oral Daily     PRN Meds:acetaminophen, bisacodyL, dextrose 10%, dextrose 10%, glucose, glucose, HYDROmorphone, labetalol, melatonin, methocarbamoL, ondansetron, oxyCODONE, oxyCODONE     Review of Systems  Objective:     Weight: 102.6 kg (226 lb 3.1 oz)  Body mass index is 38.81 kg/m².  Vital Signs (Most Recent):  Temp: 97.7 °F (36.5 °C) (03/21/24 1614)  Pulse: 62 (03/21/24 1614)  Resp: 18 (03/21/24 1614)  BP: (!) 152/75 (03/21/24 1614)  SpO2: 95 % (03/21/24 1614) Vital Signs (24h Range):  Temp:  [96 °F (35.6 °C)-98.5 °F (36.9 °C)] 97.7 °F (36.5 °C)  Pulse:  [57-80] 62  Resp:  [13-20] 18  SpO2:  [92 %-99 %] 95 %  BP: (117-171)/(56-87) 152/75     Date 03/21/24 0700 - 03/22/24 0659   Shift 8296-2509 1139-8256 0525-8664 24 Hour Total   INTAKE   P.O. 380   380   Shift Total(mL/kg) 380(3.7)   380(3.7)   OUTPUT   Shift Total(mL/kg)       Weight (kg) 102.6 102.6 102.6 102.6                            Closed/Suction Drain 03/20/24 1932 Tube - 1 Posterior Neck Accordion 10 Fr. (Active)   Site Description Healing 03/20/24 2245   Dressing Type Other (Comment) 03/20/24 2245   Dressing Status Clean;Dry;Intact 03/20/24 2245   Dressing Intervention First dressing 03/20/24 2245   Drainage Sanguineous 03/20/24 2245   Status Other (Comment) 03/20/24 2245   Output (mL) 100 mL 03/21/24 0515          Physical Exam         Neurosurgery Physical Exam    General: well developed, well nourished, no distress.   Head:  Postpartum Vaginal Delivery Instructions       Schedule appointment in 6 weeks with your OB.    Activity       Ask family and friends for help when you need it.    Do not place anything in your vagina for 6 weeks.    You are not restricted on other activities, but take it easy for a few weeks to allow your body to recover from delivery.  You are able to do any activities you feel up to that point.    No driving until you have stopped taking your pain medications (usually two weeks after delivery).     Call your health care provider if you have any of these symptoms:       Increased pain, swelling, redness, or fluid around your stiches from an episiotomy or perineal tear.    A fever above 100.4 F (38 C) with or without chills when placing a thermometer under your tongue.    You soak a sanitary pad with blood within 1 hour, or you see blood clots larger than a golf ball.    Bleeding that lasts more than 6 weeks.    Vaginal discharge that smells bad.    Severe pain, cramping or tenderness in your lower belly area.    A need to urinate more frequently (use the toilet more often), more urgently (use the toilet very quickly), or it burns when you urinate.    Nausea and vomiting.    Redness, swelling or pain around a vein in your leg.    Problems breastfeeding or a red or painful area on your breast.    Chest pain and cough or are gasping for air.    Problems coping with sadness, anxiety, or depression.  If you have any concerns about hurting yourself or the baby, call your provider immediately.     You have questions or concerns after you return home.     Keep your hands clean:  Always wash your hands before touching your perineal area and stitches.  This helps reduce your risk of infection.  If your hands aren't dirty, you may use an alcohol hand-rub to clean your hands. Keep your nails clean and short.       "normocephalic, atraumatic  Neurologic: Alert and oriented. Thought content appropriate.  GCS: Motor: 6/Verbal: 5/Eyes: 4 GCS Total: 15  Mental Status: Awake, Alert, Oriented x 4  Language: No aphasia  Speech: No dysarthria  Cranial nerves: face symmetric, tongue midline, CN II-XII grossly intact.   Eyes: pupils equal, round, reactive to light with accomodation, EOMI.   Pulmonary: normal respirations, no signs of respiratory distress  Abdomen: soft, non-distended, not tender to palpation  Sensory: intact to light touch throughout  Motor Strength: Moves all extremities spontaneously with good tone.  Full strength upper and lower extremities. No abnormal movements seen.     Strength  Deltoids Triceps Biceps Wrist Extension Wrist Flexion Hand    Upper: R 5/5 5/5 5/5 5/5 5/5 5/5    L 5/5 5/5 5/5 5/5 5/5 5/5     Iliopsoas Quadriceps Knee  Flexion Tibialis  anterior Gastro- cnemius EHL   Lower: R 5/5 5/5 5/5 5/5 5/5 5/5    L 5/5 5/5 5/5 5/5 5/5 5/5     Skin: Skin is warm, dry and intact.  Incision covered with dressing, drain intact.       Significant Labs:  Recent Labs   Lab 03/21/24  0515   *      K 4.5      CO2 23   BUN 9   CREATININE 0.6   CALCIUM 8.6*     Recent Labs   Lab 03/21/24  0515   WBC 9.09   HGB 13.3   HCT 39.9        No results for input(s): "LABPT", "INR", "APTT" in the last 48 hours.  Microbiology Results (last 7 days)       ** No results found for the last 168 hours. **          Recent Lab Results         03/21/24  0515        Anion Gap 6       Baso # 0.01       Basophil % 0.1       BUN 9       Calcium 8.6       Chloride 108       CO2 23       Creatinine 0.6       Differential Method Automated       eGFR >60.0       Eos # 0.0       Eos % 0.0       Glucose 132       Gran # (ANC) 8.0       Gran % 87.9       Hematocrit 39.9       Hemoglobin 13.3       Immature Grans (Abs) 0.05  Comment: Mild elevation in immature granulocytes is non specific and   can be seen in a variety of " conditions including stress response,   acute inflammation, trauma and pregnancy. Correlation with other   laboratory and clinical findings is essential.         Immature Granulocytes 0.6       Lymph # 0.8       Lymph % 9.0       MCH 30.0       MCHC 33.3       MCV 90       Mono # 0.2       Mono % 2.4       MPV 9.1       nRBC 0       Platelet Count 359       Potassium 4.5       RBC 4.43       RDW 13.3       Sodium 137       WBC 9.09             All pertinent labs from the last 24 hours have been reviewed.    Significant Diagnostics:  I have reviewed all pertinent imaging results/findings within the past 24 hours.

## 2024-03-21 NOTE — HOSPITAL COURSE
The patient [place] [facility] [place] for C3-C6 PCF with Dr. Meek. The patient tolerated the procedure well and there were no intra-operative complications. After surgery, the patient was extubated and taken to recovery in stable condition.  After observation in recovery, the patient was transferred to the neurosurgical floor.  Post-op X-rays showed good placement of the hardware. PT/OT were consulted, the patient progressed, and was cleared to go home.  Drain was removed on 3/23/24 without complication.  On 3/23/24 she was discharged home with pain medication and follow up appointments. The patient was tolerating a regular diet and voiding without difficulty. Activity as tolerated. At the time of discharge, the patient was neurologically stable, was afebrile, and vital signs were stable. Discharge instructions were given verbally/written to the patient and their family, including wound care and follow-up appointments, and all of their questions were answered. The patient was also given a discharge instruction sheet explaining the aforementioned discussion.  The patient verbalized understanding of instructions and agreed to the plan. They were encouraged to call the clinic with any questions they might have prior to the follow up appointments.     Hospital Course:  3/21: NAEON. Afvss. C/o neck pain. Ambulated with therapy. Voiding spontaneously. Exam stable. Continue HV drain.  3/22: NAEON. AFVSS. C/o neck pain and headache. Exam non-focal. Voiding. Ambulated in halls yesterday. Continue HV drain. Anticipate dc home tomorrow.  3/23: NAEON. AFVSS. Neurologically stable. HV drain removed. Tolerating PO diet and voiding spontaneously. Medically stable to discharge home. Follow up in clinic in 2 weeks. Discharge instructions given verbally to patient. All of her questions were answered. She was encouraged to call the clinic with any questions or concerns prior to follow up appt.     Physical Exam 3/23/24:  General:  well developed, well nourished, no distress.   Head: normocephalic, atraumatic  Neurologic: Alert and oriented. Thought content appropriate.  GCS: Motor: 6/Verbal: 5/Eyes: 4 GCS Total: 15  Mental Status: Awake, Alert, Oriented x 4  Language: No aphasia  Speech: No dysarthria  Cranial nerves: face symmetric, tongue midline, CN II-XII grossly intact.   Eyes: pupils equal, round, reactive to light with accomodation, EOMI.   Pulmonary: normal respirations, no signs of respiratory distress  Sensory: intact to light touch throughout  Motor Strength: Moves all extremities spontaneously with good tone.  Full strength upper and lower extremities. No abnormal movements seen.   Strength   Deltoids Triceps Biceps Wrist Extension Wrist Flexion Hand    Upper: R 5/5 5/5 5/5 5/5 5/5 5/5     L 5/5 5/5 5/5 5/5 5/5 5/5       Iliopsoas Quadriceps Knee  Flexion Tibialis  anterior Gastro- cnemius EHL   Lower: R 5/5 5/5 5/5 5/5 5/5 5/5     L 5/5 5/5 5/5 5/5 5/5 5/5     Skin: Skin is warm, dry and intact.  Incision: Clean, dry, staples and nylon sutures intact. Skin edges well approximated. No surrounding erythema or edema. No drainage or TTP.

## 2024-03-21 NOTE — ASSESSMENT & PLAN NOTE
Ina Blair is a 53-year-old female with hydrocephalus status post  shunt (Codman Hakim set at 100) and lumbar stenosis with lumbar radiculopathy status post L4-5 TLIF who presented with low back pain and bilateral L3 radiculopathy right-sided more than left.  Imaging shows adjacent level disease at L3-4. She also has worsening myelopathy. Now s/p C3-6 PCF.    - Neurologically stable  - Pain control: continue current regimen   - Continue dressing to incision. Discussed proper wound care.   - Drains: continue HV drain.   - Post op imaging pending.   - Brace: collar when upright/OOB  - Continue PT/OT/OOB. OOB > 6hrs/day  - GI: Diet as tolerated. Continue bowel regimen.   - Voiding spontaneously  - DVT ppx: LVX, Compression stockings, SCDs  - Atelectasis ppx: IS at bedside. Encourage use every hour while awake.  - Discussed with Dr. Meek    - Dispo: Pending drain removal

## 2024-03-21 NOTE — BRIEF OP NOTE
[facility] - Surgery ([facility])  Brief Operative Note    SUMMARY     Surgery Date: 3/20/2024     Surgeon(s) and Role:     * Quentin Meek MD - Primary     * Hipolito Carrasco MD - Resident - Assisting        Pre-op Diagnosis:  Cervical spondylosis with myelopathy [M47.12]    Post-op Diagnosis:  Post-Op Diagnosis Codes:     * Cervical spondylosis with myelopathy [M47.12]    Procedure(s) (LRB):  DECOMPRESSION, SPINE, CERVICAL, POSTERIOR APPROACH (N/A)    Anesthesia: General    Implants:  Implant Name Type Inv. Item Serial No.  Lot No. LRB No. Used Action   SCREW SYMPHONY PA 3.5X12MM - [id number]  SCREW SYMPHONY PA 3.5X12MM  SY[employer]THES  N/A 8 Implanted   SET SYMPHONY SCREW NS - [id number]  SET SYMPHONY SCREW NS  SYNTHES  N/A 8 Implanted   KENDRICK SYMPHONY PRE-CUT 3.5X55MM - [id number]  KENDRICK SYMPHONY PRE-CUT 3.5X55MM  SYNTHES  N/A 2 Implanted       Operative Findings:   C3-C6 posterior cervical decompression and fusion  HV drain subfascial    Estimated Blood Loss: 100cc    Estimated Blood Loss has been documented.         Specimens:   Specimen (24h ago, onward)      None            [id number]

## 2024-03-21 NOTE — CARE UPDATE
I have reviewed the chart of Ina Blair and collaborated with Quentin Meek MD in the care of the patient who is hospitalized for the following:    Active Hospital Problems    Diagnosis    *Cervical spondylosis with myelopathy    Class 2 obesity due to excess calories with body mass index (BMI) of 38.0 to 38.9 in adult    Primary hypertension          I have reviewed Ina Blair with the multidisciplinary team during discharge huddle.       Mei Mendoza PA-C  [facility]

## 2024-03-21 NOTE — PT/OT/SLP EVAL
Occupational Therapy   Co-Evaluation & Co-Treatment  Co-evaluation/treatment performed due to patient's multiple deficits requiring two skilled therapists to appropriately and safely assess patient's strength and endurance while facilitating functional tasks in addition to accommodating for patient's activity tolerance.      Name: Ina Blair  MRN: [id number]  Admitting Diagnosis: Cervical spondylosis with myelopathy  Recent Surgery: Procedure(s) (LRB):  DECOMPRESSION, SPINE, CERVICAL, POSTERIOR APPROACH (N/A) 1 Day Post-Op    Recommendations:     Discharge Recommendations: Low Intensity Therapy  Discharge Equipment Recommendations:  grab bar  Barriers to discharge:  None    Assessment:     Ina Blair is a 53 y.o. female with a medical diagnosis of Cervical spondylosis with myelopathy.  She presents with performance deficits affecting function: weakness, impaired balance, decreased safety awareness, pain, impaired endurance, impaired self care skills, impaired functional mobility, gait instability, orthopedic precautions, decreased ROM.  Pt tolerated tx and eval well. Able to walk to the BR to use toilet with SBA and RW. Limited to ROM/MMT 2/2 neck pain. Pt will continue to benefit from skilled OT services to address impairments listed above to maximize independence with ADLs and functional mobility to ensure safe return to PLOF.     Rehab Prognosis: Good; patient would benefit from acute skilled OT services to address these deficits and reach maximum level of function.       Plan:     Patient to be seen 3 x/week to address the above listed problems via self-care/home management, therapeutic activities, therapeutic exercises  Plan of Care Expires: 04/20/24  Plan of Care Reviewed with: patient, spouse    Subjective     Chief Complaint: pain  Patient/Family Comments/goals: get better    Occupational Profile:  Living Environment: Lives with spouse in 2SH, B&B on 1st floor, 2STE, 0HR  Bathroom- t/s  combo  Previous level of function: Ind  Roles and Routines: was working and driving  Equipment Used at Home: walker, rolling  Assistance upon Discharge: spouse who works nights     Pain/Comfort:  Pain Rating 1: 9/10  Location - Side 1: Bilateral  Location - Orientation 1: generalized  Location 1: neck  Pain Addressed 1: Reposition, Distraction  Pain Rating Post-Intervention 1: 10/10    Patients cultural, spiritual, [employer]alism conflicts given the current situation: no    Objective:     Communicated with: RN prior to session.  Patient found HOB elevated with peripheral IV, oxygen, hemovac upon OT entry to room.    General Precautions: Standard, fall  Orthopedic Precautions: spinal precautions  Braces: Cervical collar  Respiratory Status: Nasal cannula    Occupational Performance:    Bed Mobility:    Patient completed Rolling/Turning to Right with stand by assistance  Patient completed Scooting/Bridging with contact guard assistance  Patient completed Supine to Sit with contact guard assistance    Functional Mobility/Transfers:  Patient completed Sit <> Stand Transfer with stand by assistance  with  rolling walker   Patient completed Bed <> Chair Transfer using Step Transfer technique with stand by assistance with rolling walker and verbal cues for hand placement  Patient completed Toilet Transfer Step Transfer technique with stand by assistance with  rolling walker, grab bars, and verbal cues for hand placement  Functional Mobility: Pt ambulated from bed>BR and BR>chair with SBA and RW to simulate home distances and maximize functional endurance required for community mobility.      Activities of Daily Living:  Grooming: stand by assistance oral hygiene UIC, verbal cues to not bend neck during spitting  Upper Body Dressing: minimum assistance donning gown  Lower Body Dressing: minimum assistance pt able to don slippers, Min A needed to adjust L shoe when donned  Toileting: stand by assistance wiping anterior boyd area  seated on toilet    Cognitive/Visual Perceptual:  Cognitive/Psychosocial Skills:     -       Oriented to: Person, Place, Time, and Situation   -       Follows Commands/attention:Follows multistep  commands  -       Safety awareness/insight to disability: intact   -       Mood/Affect/Coping skills/emotional control: Appropriate to situation    Physical Exam:  Dominant hand: -       R  Upper Extremity Range of Motion:     -       Right Upper Extremity: WFL except shld flex~60*, shld ABD~45*, passively at 90* for both, limited 2/2 pain   -       Left Upper Extremity: same as R  Upper Extremity Strength:    -       Right Upper Extremity: NT 2/2 pain and cervical precautions  -       Left Upper Extremity: same as R   Strength:    -       Right Upper Extremity: WFL  -       Left Upper Extremity: WFL  Fine Motor Coordination:    -       Intact  Left hand thumb/finger opposition skills and Right hand thumb/finger opposition skills    AMPAC 6 Click ADL:  AMPAC Total Score: 20    Treatment & Education:  Pt educated on   Role of OT and OT POC  Safe transfer techniques and proper body mechanics for fall prevention and improved independence with functional transfers  Importance of OOB activities to increase endurance and tolerance for increased participation in daily ADLs    Utilizing the call bell to request for assistance with all functional mobility to ensure safety during hospital stay.    Pt verbalized understanding and all questions were addressed within the scope of OT.     Patient left up in chair with all lines intact, call button in reach, RN notified, and  present    GOALS:   Multidisciplinary Problems       Occupational Therapy Goals          Problem: Occupational Therapy    Goal Priority Disciplines Outcome Interventions   Occupational Therapy Goal     OT, PT/OT Ongoing, Progressing    Description: Goals to be met by: 3/28/24     Patient will increase functional independence with ADLs by performing:    UE  Dressing with Set-up Assistance.  LE Dressing with Modified [place].  Grooming while standing at sink with Modified [place].  Step transfer with Modified [place]  Toilet transfer to toilet with Modified [place].                         History:     Past Medical History:   Diagnosis Date    Enlarged thyroid 2/20/2024    Headache     Hydrocephalus     Hypertension     Neuropathy     Unspecified osteoarthritis, unspecified site          Past Surgical History:   Procedure Laterality Date    APPENDECTOMY      bladder stretch      CARPAL TUNNEL RELEASE Bilateral     DECOMPRESSION OF CERVICAL SPINE BY POSTERIOR APPROACH N/A 3/20/2024    Procedure: DECOMPRESSION, SPINE, CERVICAL, POSTERIOR APPROACH;  Surgeon: Quentin Meek MD;  [facility]: [facility] OR [address] [place] [address];  Service: Neurosurgery;  Laterality: N/A;  [facility] patient  C3-6 PCF  Anesthesia: General  NM: EMg, SEP, MNEP  Rad: C-arm  Brace: [place] J  Bed: [facility] slider w gel rolls  Bed position: head to core  [facility]    LAMINECTOMY AND MICRODISCECTOMY LUMBAR SPINE  2021    LUMBAR FUSION  2008    TOTAL HIP ARTHROPLASTY Left 10/31/2023    TOTAL KNEE ARTHROPLASTY Bilateral     VENTRICULOPERITONEAL SHUNT         Time Tracking:     OT Date of Treatment: 03/21/24  OT Start Time: 0853  OT Stop Time: 0919  OT Total Time (min): 26 min    Billable Minutes:Evaluation 10  Therapeutic Activity 16    3/21/2024

## 2024-03-21 NOTE — TELEPHONE ENCOUNTER
[facility] patient    Rounded on patient in hospital earlier. Patient was ambulating from bathroom to wheelchair on her way to radiology. Patient has cervical collar intact and drains noted.  at bedside. Worked with PT/OT today. Will assess if patient needs home health closer to discharge.     Discharge to [facility] when medically stable. Followup Clear to travel appt as below.    Future Appointments   Date Time Provider Department Center   3/27/2024 11:30 AM Anastacia Ritchie, NP [facility] NEUROS8 [facility]     Ashley Araiza, RN, CMSRN  RN Navigator  [facility]   [phone]  Fax [phone]

## 2024-03-21 NOTE — PLAN OF CARE
[facility] - Surgery  Initial Discharge Assessment       Primary Care Provider: Kym, Primary Doctor   PCP DR. DEVIKA PEÑA     Admission Diagnosis: Cervical spondylosis with myelopathy [M47.12]  Cervical myelopathy [G95.9]    Admission Date: 3/20/2024  Expected Discharge Date: 3/22/2024    Transition of Care Barriers: None    Payor: AETNA / Plan: AETNA CHOICE POS / Product Type: Commercial /     Extended Emergency Contact Information  Primary Emergency Contact: GilbertChandan mcgee  Mobile Phone: [phone]  Relation: Spouse  Preferred language: English    Discharge Plan A: Home with family  Discharge Plan B: Home    No Pharmacies Listed    Initial Assessment (most recent)       Adult Discharge Assessment - 03/21/24 0947          Discharge Assessment    Assessment Type Discharge Planning Assessment     Confirmed/corrected address, phone number and insurance Yes     Confirmed Demographics Correct on Facesheet     Source of Information patient     Does patient/caregiver understand observation status Yes     Communicated DEYANIRA with patient/caregiver Yes     Reason For Admission Cervical spondylosis with myelopathy     People in Home spouse     Do you expect to return to your current living situation? Yes     Do you have help at home or someone to help you manage your care at home? Yes     Prior to hospitilization cognitive status: Alert/Oriented     Current cognitive status: Alert/Oriented     Walking or Climbing Stairs Difficulty no     Dressing/Bathing Difficulty no     Equipment Currently Used at Home walker, rolling     Readmission within 30 days? No     Patient currently being followed by outpatient case management? No     Do you currently have service(s) that help you manage your care at home? No     Do you take prescription medications? Yes     Do you have prescription coverage? Yes     Coverage Payor: AETNA - AETNA CHOICE POS -     Do you have any problems affording any of your prescribed medications? No     Is  the patient taking medications as prescribed? yes     How do you get to doctors appointments? car, drives self;family or friend will provide     Are you on dialysis? No     Do you take coumadin? No     Discharge Plan A Home with family     Discharge Plan B Home     DME Needed Upon Discharge  none     Discharge Plan discussed with: Patient     Transition of Care Barriers None        Physical Activity    On average, how many days per week do you engage in moderate to strenuous exercise (like a brisk walk)? 0 days     On average, how many minutes do you engage in exercise at this level? 0 min        Financial Resource Strain    How hard is it for you to pay for the very basics like food, housing, medical care, and heating? Not very hard        Housing Stability    In the last 12 months, was there a time when you were not able to pay the mortgage or rent on time? No     In the last 12 months, how many places have you lived? 1     In the last 12 months, was there a time when you did not have a steady place to sleep or slept in a shelter (including now)? No        Transportation Needs    In the past 12 months, has lack of transportation kept you from medical appointments or from getting medications? No     In the past 12 months, has lack of transportation kept you from meetings, work, or from getting things needed for daily living? No        Food Insecurity    Within the past 12 months, you worried that your food would run out before you got the money to buy more. Never true     Within the past 12 months, the food you bought just didn't last and you didn't have money to get more. Never true        Stress    Do you feel stress - tense, restless, nervous, or anxious, or unable to sleep at night because your mind is troubled all the time - these days? To some extent        Social Connections    In a typical week, how many times do you talk on the phone with family, friends, or neighbors? Three times a week     How often do  you get together with friends or relatives? Three times a week     Do you belong to any clubs or organizations such as Sikhism groups, unions, fraternal or athletic groups, or school groups? No     How often do you attend meetings of the clubs or organizations you belong to? Never     Are you , , , , never , or living with a partner?         Alcohol Use    Q1: How often do you have a drink containing alcohol? Never     Q2: How many drinks containing alcohol do you have on a typical day when you are drinking? Patient does not drink     Q3: How often do you have six or more drinks on one occasion? Never                   Spoke to pt. Pt lives at home with her . Post hospital  stay  will be pt support person and pt. has transportation at d/c with her . There have been no hospitalizations within the last 30 days per pt. Verified pt PCP and preferred pharmacy. Pt stated not on Coumadin and is not receiving dialysis. All questions answered regarding case management/ discharge planning , pt verbalized understanding. Discharge booklet with SW contact information given to pt.     Discharge Plan A and Plan B have been determined by review of patient's clinical status, future medical and therapeutic needs, and coverage/benefits for post-acute care in coordination with multidisciplinary team members.      ANILA Reagan  [facility] [place] [facility]  [phone]

## 2024-03-21 NOTE — PLAN OF CARE
PT eval completed- see note for details, goals and POC established.     Problem: Physical Therapy  Goal: Physical Therapy Goal  Description: Goals to be met by: 24     Patient will increase functional independence with mobility by performin. Supine to sit with Set-up [place]  2. Rolling to Left and Right with Set-up Assistance.  3. Sit to stand transfer with Modified [place]  4. Bed to chair transfer with Modified [place] using Rolling Walker  5. Gait  x 150 feet with Supervision using Rolling Walker.   6. Ascend/descend 2 stair with no Handrails Contact Guard Assistance using No Assistive Device.     Outcome: Ongoing, Progressing   3/21/2024

## 2024-03-21 NOTE — PT/OT/SLP EVAL
Physical Therapy Co-Evaluation and Treatment    OT present for coeval due to pt's multiple medical comorbidities and functional/cognition deficits requiring two skilled therapists to appropriately progress pt's musculoskeletal strength, neuromuscular control, and endurance while taking into consideration medical acuity and pt safety.    Patient Name: Ina Blair   MRN: [id number]  Recent Surgery: Procedure(s) (LRB):  DECOMPRESSION, SPINE, CERVICAL, POSTERIOR APPROACH (N/A) 1 Day Post-Op    Recommendations:     Discharge Recommendations: Low Intensity Therapy   Discharge Equipment Recommendations: none   Barriers to discharge: None    Assessment:     Ina Blair is a 53 y.o. female admitted with a medical diagnosis of Cervical spondylosis with myelopathy. She presents with the following impairments/functional limitations: weakness, impaired endurance, impaired self care skills, impaired functional mobility, gait instability, impaired balance, pain, decreased ROM, orthopedic precautions.     Pt receptive and tolerated PT co-eval with OT well. Pt educated on spinal precautions prior to start of treatment with pt verbalizing understanding. Pt amb ~20 ft in the room this session with RW and SBA. Patient currently demonstrates a need for low intensity therapy on a scheduled basis secondary to a decline in functional status due to surgical procedure    Rehab Prognosis: Good; patient would benefit from acute PT services to address these deficits and reach maximum level of function.    Plan:     During this hospitalization, patient to be seen 4 x/week to address the above listed problems via gait training, therapeutic activities, therapeutic exercises, neuromuscular re-education    Plan of Care Expires: 04/20/24    Subjective     Chief Complaint: neck pain  Patient Comments/Goals: to go home  Pain/Comfort:  Pain Rating 1: 4/10  Location - Side 1: Bilateral  Location - Orientation 1: generalized  Location  1: neck  Pain Addressed 1: Reposition, Distraction, Nurse notified  Pain Rating Post-Intervention 1: 9/10    Patient History:     Living Environment: Pt lives with spouse in 2SH with 2 ADEN with no HR. Pt's Bed/bath located on 1st floor. Bathroom: tub/shower combo  Prior Level of Function: IND  DME owned: RW  Caregiver Assistance: spouse    Objective:     Communicated with RN prior to session. Patient found HOB elevated with peripheral IV, SCD, hemovac upon PT entry to room.    General Precautions: Standard, fall   Orthopedic Precautions: spinal precautions   Braces: Aspen collar    Respiratory Status: Room air    Exams:  RLE ROM: WFL  RLE Strength: WFL  LLE ROM: WFL  LLE Strength: WFL  Cognitive: Patient is oriented to Person, Place, Time, Situation  Sensation:    -       Intact    Functional Mobility:  Bed Mobility  Rolling Right: contact guard assistance  Scooting: contact guard assistance  Supine to Sit: contact guard assistance for log rolling technique    Transfers  Sit to Stand: stand by assistance with rolling walker and with cues for hand placement  Toilet Transfer: contact guard assistance with rolling walker using Step Transfer    Gait  Patient ambulated 10 ft to bathroom then 10 ft to bedside chair with rolling walker and stand by assistance. Patient required cues for position in walker to increase independence and safety.     Balance  Sitting: FAIR+: Maintains balance through MINIMAL excursions of active trunk motion  Standing: FAIR+: Needs CLOSE SUPERVISION during gait and is able to right self with minor LOB      Therapeutic Activities and Exercises:  Patient educated on role of acute care PT and PT POC, safety while in hospital including calling nurse for mobility, and call light usage.  Educated on spinal precautions including avoidance of bending, lifting, and twisting. Patient expressed understanding. Educated on log roll technique, performed to right.  Educated about importance of OOB mobility and  remaining up in chair most of the day.  All questions answered within the scope of PT.  White board updated accordingly.      AM-PAC 6 CLICK MOBILITY  Total Score:18    Patient left up in chair with all lines intact, call button in reach, and spouse present.    GOALS:   Multidisciplinary Problems       Physical Therapy Goals          Problem: Physical Therapy    Goal Priority Disciplines Outcome Goal Variances Interventions   Physical Therapy Goal     PT, PT/OT Ongoing, Progressing     Description: Goals to be met by: 24     Patient will increase functional independence with mobility by performin. Supine to sit with Set-up [place]  2. Rolling to Left and Right with Set-up Assistance.  3. Sit to stand transfer with Modified [place]  4. Bed to chair transfer with Modified [place] using Rolling Walker  5. Gait  x 150 feet with Supervision using Rolling Walker.   6. Ascend/descend 2 stair with no Handrails Contact Guard Assistance using No Assistive Device.                          History:     Past Medical History:   Diagnosis Date    Enlarged thyroid 2024    Headache     Hydrocephalus     Hypertension     Neuropathy     Unspecified osteoarthritis, unspecified site        Past Surgical History:   Procedure Laterality Date    APPENDECTOMY      bladder stretch      CARPAL TUNNEL RELEASE Bilateral     DECOMPRESSION OF CERVICAL SPINE BY POSTERIOR APPROACH N/A 3/20/2024    Procedure: DECOMPRESSION, SPINE, CERVICAL, POSTERIOR APPROACH;  Surgeon: Quentin Meek MD;  Location: [facility] OR [address] [place] [address];  Service: Neurosurgery;  Laterality: N/A;  CHAD patient  C3-6 PCF  Anesthesia: General  NM: EMg, SEP, MNEP  Rad: C-arm  Brace: [place] MONIQUE  Bed: REg slider w gel rolls  Bed position: head to core  [facility]    LAMINECTOMY AND MICRODISCECTOMY LUMBAR SPINE      LUMBAR FUSION  2008    TOTAL HIP ARTHROPLASTY Left 10/31/2023    TOTAL KNEE ARTHROPLASTY Bilateral     VENTRICULOPERITONEAL SHUNT          Time Tracking:     PT Received On: 03/21/24  PT Start Time: 0853  PT Stop Time: 0919  PT Total Time (min): 26 min     Billable Minutes: Evaluation 10 and Gait Training 16    3/21/2024

## 2024-03-21 NOTE — ANESTHESIA PROCEDURE NOTES
Intubation    Date/Time: 3/20/2024 6:07 PM    Performed by: Paz Rasheed CRNA  Authorized by: HARINI Hidalgo MD    Intubation:     Induction:  Intravenous    Intubated:  Postinduction    Mask Ventilation:  Easy mask    Attempts:  1    Attempted By:  CRNA    Method of Intubation:  Video laryngoscopy    Blade:  Nolan 3    Laryngeal View Grade: Grade I - full view of cords      Difficult Airway Encountered?: No      Complications:  None    Airway Device:  Oral endotracheal tube    Airway Device Size:  7.5    Style/Cuff Inflation:  Cuffed (inflated to minimal occlusive pressure)    Tube secured:  22    Secured at:  The teeth    Placement Verified By:  Capnometry    Complicating Factors:  None    Findings Post-Intubation:  BS equal bilateral and atraumatic/condition of teeth unchanged

## 2024-03-21 NOTE — PLAN OF CARE
Problem: Occupational Therapy  Goal: Occupational Therapy Goal  Description: Goals to be met by: 3/28/24     Patient will increase functional independence with ADLs by performing:    UE Dressing with Set-up Assistance.  LE Dressing with Modified [place].  Grooming while standing at sink with Modified [place].  Step transfer with Modified [place]  Toilet transfer to toilet with Modified [place].    Outcome: Ongoing, Progressing

## 2024-03-22 ENCOUNTER — TELEPHONE (OUTPATIENT)
Dept: NEUROSURGERY | Facility: CLINIC | Age: 54
End: 2024-03-22
Payer: COMMERCIAL

## 2024-03-22 LAB
ANION GAP SERPL CALC-SCNC: 7 MMOL/L (ref 8–16)
BASOPHILS # BLD AUTO: 0.02 K/UL (ref 0–0.2)
BASOPHILS NFR BLD: 0.2 % (ref 0–1.9)
BUN SERPL-MCNC: 7 MG/DL (ref 6–20)
CALCIUM SERPL-MCNC: 8.7 MG/DL (ref 8.7–10.5)
CHLORIDE SERPL-SCNC: 106 MMOL/L (ref 95–110)
CO2 SERPL-SCNC: 28 MMOL/L (ref 23–29)
CREAT SERPL-MCNC: 0.6 MG/DL (ref 0.5–1.4)
DIFFERENTIAL METHOD BLD: NORMAL
EOSINOPHIL # BLD AUTO: 0 K/UL (ref 0–0.5)
EOSINOPHIL NFR BLD: 0.3 % (ref 0–8)
ERYTHROCYTE [DISTWIDTH] IN BLOOD BY AUTOMATED COUNT: 13.6 % (ref 11.5–14.5)
EST. GFR  (NO RACE VARIABLE): >60 ML/MIN/1.73 M^2
GLUCOSE SERPL-MCNC: 92 MG/DL (ref 70–110)
HCT VFR BLD AUTO: 37.6 % (ref 37–48.5)
HGB BLD-MCNC: 12.5 G/DL (ref 12–16)
IMM GRANULOCYTES # BLD AUTO: 0.03 K/UL (ref 0–0.04)
IMM GRANULOCYTES NFR BLD AUTO: 0.3 % (ref 0–0.5)
LYMPHOCYTES # BLD AUTO: 3.7 K/UL (ref 1–4.8)
LYMPHOCYTES NFR BLD: 30.6 % (ref 18–48)
MCH RBC QN AUTO: 30 PG (ref 27–31)
MCHC RBC AUTO-ENTMCNC: 33.2 G/DL (ref 32–36)
MCV RBC AUTO: 90 FL (ref 82–98)
MONOCYTES # BLD AUTO: 0.9 K/UL (ref 0.3–1)
MONOCYTES NFR BLD: 7.6 % (ref 4–15)
NEUTROPHILS # BLD AUTO: 7.3 K/UL (ref 1.8–7.7)
NEUTROPHILS NFR BLD: 61 % (ref 38–73)
NRBC BLD-RTO: 0 /100 WBC
PLATELET # BLD AUTO: 349 K/UL (ref 150–450)
PMV BLD AUTO: 9.5 FL (ref 9.2–12.9)
POTASSIUM SERPL-SCNC: 3.8 MMOL/L (ref 3.5–5.1)
RBC # BLD AUTO: 4.16 M/UL (ref 4–5.4)
SODIUM SERPL-SCNC: 141 MMOL/L (ref 136–145)
WBC # BLD AUTO: 11.93 K/UL (ref 3.9–12.7)

## 2024-03-22 PROCEDURE — 36415 COLL VENOUS BLD VENIPUNCTURE: CPT | Performed by: STUDENT IN AN ORGANIZED HEALTH CARE EDUCATION/TRAINING PROGRAM

## 2024-03-22 PROCEDURE — 99024 POSTOP FOLLOW-UP VISIT: CPT | Mod: COE,,, | Performed by: PHYSICIAN ASSISTANT

## 2024-03-22 PROCEDURE — 94761 N-INVAS EAR/PLS OXIMETRY MLT: CPT

## 2024-03-22 PROCEDURE — 11000001 HC ACUTE MED/SURG PRIVATE ROOM

## 2024-03-22 PROCEDURE — 97116 GAIT TRAINING THERAPY: CPT

## 2024-03-22 PROCEDURE — 63600175 PHARM REV CODE 636 W HCPCS: Performed by: PHYSICIAN ASSISTANT

## 2024-03-22 PROCEDURE — 85025 COMPLETE CBC W/AUTO DIFF WBC: CPT | Performed by: STUDENT IN AN ORGANIZED HEALTH CARE EDUCATION/TRAINING PROGRAM

## 2024-03-22 PROCEDURE — 25000003 PHARM REV CODE 250: Performed by: STUDENT IN AN ORGANIZED HEALTH CARE EDUCATION/TRAINING PROGRAM

## 2024-03-22 PROCEDURE — 63600175 PHARM REV CODE 636 W HCPCS: Performed by: STUDENT IN AN ORGANIZED HEALTH CARE EDUCATION/TRAINING PROGRAM

## 2024-03-22 PROCEDURE — 94799 UNLISTED PULMONARY SVC/PX: CPT

## 2024-03-22 PROCEDURE — 80048 BASIC METABOLIC PNL TOTAL CA: CPT | Performed by: STUDENT IN AN ORGANIZED HEALTH CARE EDUCATION/TRAINING PROGRAM

## 2024-03-22 RX ORDER — METHOCARBAMOL 750 MG/1
750 TABLET, FILM COATED ORAL EVERY 6 HOURS PRN
Qty: 56 TABLET | Refills: 0 | Status: SHIPPED | OUTPATIENT
Start: 2024-03-22 | End: 2024-04-06

## 2024-03-22 RX ORDER — OXYCODONE HYDROCHLORIDE 10 MG/1
10 TABLET ORAL EVERY 6 HOURS PRN
Qty: 56 TABLET | Refills: 0 | Status: SHIPPED | OUTPATIENT
Start: 2024-03-22 | End: 2024-03-27

## 2024-03-22 RX ADMIN — AMLODIPINE BESYLATE 2.5 MG: 2.5 TABLET ORAL at 09:03

## 2024-03-22 RX ADMIN — DEXTROSE MONOHYDRATE 1 G: 2.5 INJECTION INTRAVENOUS at 02:03

## 2024-03-22 RX ADMIN — OXYCODONE HYDROCHLORIDE 10 MG: 10 TABLET ORAL at 08:03

## 2024-03-22 RX ADMIN — DIAZEPAM 5 MG: 5 TABLET ORAL at 08:03

## 2024-03-22 RX ADMIN — METHOCARBAMOL 750 MG: 750 TABLET ORAL at 01:03

## 2024-03-22 RX ADMIN — METHOCARBAMOL 750 MG: 750 TABLET ORAL at 08:03

## 2024-03-22 RX ADMIN — ESCITALOPRAM OXALATE 20 MG: 20 TABLET ORAL at 09:03

## 2024-03-22 RX ADMIN — ENOXAPARIN SODIUM 40 MG: 40 INJECTION SUBCUTANEOUS at 04:03

## 2024-03-22 RX ADMIN — POLYETHYLENE GLYCOL 3350 17 G: 17 POWDER, FOR SOLUTION ORAL at 09:03

## 2024-03-22 RX ADMIN — GABAPENTIN 400 MG: 300 CAPSULE ORAL at 08:03

## 2024-03-22 RX ADMIN — OXYCODONE 5 MG: 5 TABLET ORAL at 02:03

## 2024-03-22 RX ADMIN — Medication 6 MG: at 11:03

## 2024-03-22 RX ADMIN — OXYCODONE HYDROCHLORIDE 10 MG: 10 TABLET ORAL at 11:03

## 2024-03-22 RX ADMIN — METHOCARBAMOL 750 MG: 750 TABLET ORAL at 10:03

## 2024-03-22 RX ADMIN — DEXTROSE MONOHYDRATE 1 G: 2.5 INJECTION INTRAVENOUS at 11:03

## 2024-03-22 RX ADMIN — ACETAMINOPHEN 650 MG: 325 TABLET ORAL at 02:03

## 2024-03-22 RX ADMIN — OXYCODONE 5 MG: 5 TABLET ORAL at 08:03

## 2024-03-22 RX ADMIN — OXYCODONE 5 MG: 5 TABLET ORAL at 01:03

## 2024-03-22 RX ADMIN — DIAZEPAM 5 MG: 5 TABLET ORAL at 05:03

## 2024-03-22 RX ADMIN — GABAPENTIN 400 MG: 300 CAPSULE ORAL at 02:03

## 2024-03-22 RX ADMIN — DIAZEPAM 5 MG: 5 TABLET ORAL at 02:03

## 2024-03-22 RX ADMIN — DEXTROSE MONOHYDRATE 1 G: 2.5 INJECTION INTRAVENOUS at 09:03

## 2024-03-22 RX ADMIN — GABAPENTIN 400 MG: 300 CAPSULE ORAL at 09:03

## 2024-03-22 RX ADMIN — LOSARTAN POTASSIUM AND HYDROCHLOROTHIAZIDE 1 TABLET: 50; 12.5 TABLET, FILM COATED ORAL at 09:03

## 2024-03-22 NOTE — SUBJECTIVE & OBJECTIVE
Interval History:  NAEON. AFVSS. C/o neck pain and headache. Exam non-focal. Voiding. Ambulated in halls yesterday. Continue HV drain. Anticipate dc home tomorrow.    Medications:  Continuous Infusions:  Scheduled Meds:   amLODIPine  2.5 mg Oral Daily    ceFAZolin (Ancef) IV (PEDS and ADULTS)  1 g Intravenous Q8H    diazePAM  5 mg Oral Q8H    enoxparin  40 mg Subcutaneous Q24H (prophylaxis, 1700)    EScitalopram oxalate  20 mg Oral Daily    gabapentin  400 mg Oral TID    losartan-hydrochlorothiazide 50-12.5 mg  1 tablet Oral Daily    polyethylene glycol  17 g Oral Daily     PRN Meds:acetaminophen, bisacodyL, dextrose 10%, dextrose 10%, glucose, glucose, HYDROmorphone, labetalol, melatonin, methocarbamoL, ondansetron, oxyCODONE, oxyCODONE     Review of Systems  Objective:     Weight: 102.6 kg (226 lb 3.1 oz)  Body mass index is 38.81 kg/m².  Vital Signs (Most Recent):  Temp: 98.3 °F (36.8 °C) (03/22/24 0829)  Pulse: (!) 58 (03/22/24 0829)  Resp: 17 (03/22/24 0832)  BP: (!) 185/77 (03/22/24 0829)  SpO2: (!) 94 % (03/22/24 0900) Vital Signs (24h Range):  Temp:  [97.3 °F (36.3 °C)-98.3 °F (36.8 °C)] 98.3 °F (36.8 °C)  Pulse:  [57-67] 58  Resp:  [16-18] 17  SpO2:  [92 %-97 %] 94 %  BP: (138-185)/(62-77) 185/77                                Closed/Suction Drain 03/20/24 1932 Tube - 1 Posterior Neck Accordion 10 Fr. (Active)   Site Description Healing 03/20/24 2245   Dressing Type Other (Comment) 03/20/24 2245   Dressing Status Clean;Dry;Intact 03/20/24 2245   Dressing Intervention First dressing 03/20/24 2245   Drainage Sanguineous 03/20/24 2245   Status Other (Comment) 03/20/24 2245   Output (mL) 100 mL 03/21/24 0515          Physical Exam         Neurosurgery Physical Exam    General: well developed, well nourished, no distress.   Head: normocephalic, atraumatic  Neurologic: Alert and oriented. Thought content appropriate.  GCS: Motor: 6/Verbal: 5/Eyes: 4 GCS Total: 15  Mental Status: Awake, Alert, Oriented x  "4  Language: No aphasia  Speech: No dysarthria  Cranial nerves: face symmetric, tongue midline, CN II-XII grossly intact.   Eyes: pupils equal, round, reactive to light with accomodation, EOMI.   Pulmonary: normal respirations, no signs of respiratory distress  Abdomen: soft, non-distended, not tender to palpation  Sensory: intact to light touch throughout  Motor Strength: Moves all extremities spontaneously with good tone.  Full strength upper and lower extremities. No abnormal movements seen.     Strength  Deltoids Triceps Biceps Wrist Extension Wrist Flexion Hand    Upper: R 5/5 5/5 5/5 5/5 5/5 5/5    L 5/5 5/5 5/5 5/5 5/5 5/5     Iliopsoas Quadriceps Knee  Flexion Tibialis  anterior Gastro- cnemius EHL   Lower: R 5/5 5/5 5/5 5/5 5/5 5/5    L 5/5 5/5 5/5 5/5 5/5 5/5     Skin: Skin is warm, dry and intact.  Incision covered with dressing, drain intact.   HV intact to suction.    Significant Labs:  Recent Labs   Lab 03/21/24  0515 03/22/24  0422   * 92    141   K 4.5 3.8    106   CO2 23 28   BUN 9 7   CREATININE 0.6 0.6   CALCIUM 8.6* 8.7       Recent Labs   Lab 03/21/24  0515 03/22/24  0422   WBC 9.09 11.93   HGB 13.3 12.5   HCT 39.9 37.6    349       No results for input(s): "LABPT", "INR", "APTT" in the last 48 hours.  Microbiology Results (last 7 days)       ** No results found for the last 168 hours. **          Recent Lab Results         03/22/24 0422        Anion Gap 7       Baso # 0.02       Basophil % 0.2       BUN 7       Calcium 8.7       Chloride 106       CO2 28       Creatinine 0.6       Differential Method Automated       eGFR >60.0       Eos # 0.0       Eos % 0.3       Glucose 92       Gran # (ANC) 7.3       Gran % 61.0       Hematocrit 37.6       Hemoglobin 12.5       Immature Grans (Abs) 0.03  Comment: Mild elevation in immature granulocytes is non specific and   can be seen in a variety of conditions including stress response,   acute inflammation, trauma and " pregnancy. Correlation with other   laboratory and clinical findings is essential.         Immature Granulocytes 0.3       Lymph # 3.7       Lymph % 30.6       MCH 30.0       MCHC 33.2       MCV 90       Mono # 0.9       Mono % 7.6       MPV 9.5       nRBC 0       Platelet Count 349       Potassium 3.8       RBC 4.16       RDW 13.6       Sodium 141       WBC 11.93             All pertinent labs from the last 24 hours have been reviewed.    Significant Diagnostics:  I have reviewed all pertinent imaging results/findings within the past 24 hours.

## 2024-03-22 NOTE — PT/OT/SLP PROGRESS
Physical Therapy Treatment    Patient Name:  Ina Blair   MRN:  [id number]    Recommendations:     Discharge Recommendations: Low Intensity Therapy  Discharge Equipment Recommendations: none  Barriers to discharge: None    Assessment:     Ina Blair is a 53 y.o. female admitted with a medical diagnosis of Cervical spondylosis with myelopathy.  She presents with the following impairments/functional limitations: weakness, impaired endurance, impaired functional mobility, gait instability, orthopedic precautions, decreased ROM     Pt agreeable and tolerated PT treatment well. Pt amb ~170 ft with RW and SBA this session.  Patient continues to demonstrate the need for low intensity therapy on a scheduled basis exhibited by decreased independence with functional mobility.    Rehab Prognosis: Good; patient would benefit from acute skilled PT services to address these deficits and reach maximum level of function.    Recent Surgery: Procedure(s) (LRB):  DECOMPRESSION, SPINE, CERVICAL, POSTERIOR APPROACH (N/A) 2 Days Post-Op    Plan:     During this hospitalization, patient to be seen 4 x/week to address the identified rehab impairments via gait training, therapeutic activities, therapeutic exercises, neuromuscular re-education and progress toward the following goals:    Plan of Care Expires:  04/20/24    Subjective     Chief Complaint: neck pain  Patient/Family Comments/goals: to go home  Pain/Comfort:  Pain Rating 1: 3/10  Location - Side 1: Bilateral  Location - Orientation 1: generalized  Location 1: neck  Pain Addressed 1: Reposition, Distraction  Pain Rating Post-Intervention 1: 3/10      Objective:     Communicated with RN prior to session.  Patient found HOB elevated with SCD, cervical collar upon PT entry to room.     General Precautions: Standard, fall  Orthopedic Precautions: spinal precautions  Braces: Aspen collar  Respiratory Status: Room air     Functional Mobility:    Bed Mobility:    Rolling: to L with stand by assistance  Supine > Sit: stand by assistance    Transfers:   Sit <> Stand Transfer: stand by assistance from EOB using rolling walker     Balance:   Sitting balance: FAIR+: Maintains balance through MINIMAL excursions of active trunk motion  Standing balance:   FAIR+: Takes MINIMAL challenges from all directions  FAIR+: Needs CLOSE SUPERVISION during gait and is able to right self with minor LOB                 Gait:  Distance: 170 ft   Assistive Device: RW  Assistance Level: stand by assistance  Gait Assessment: Pt amb with decreased deven and decreased step length      AM-PAC 6 CLICK MOBILITY  Turning over in bed (including adjusting bedclothes, sheets and blankets)?: 4  Sitting down on and standing up from a chair with arms (e.g., wheelchair, bedside commode, etc.): 4  Moving from lying on back to sitting on the side of the bed?: 4  Moving to and from a bed to a chair (including a wheelchair)?: 4  Need to walk in hospital room?: 4  Climbing 3-5 steps with a railing?: 3  Basic Mobility Total Score: 23       Treatment & Education:  Pt educated on tip to reduce fall risk and safety with mobility and using call button for assistance from nursing staff with OOB mobility.  Pt educated on sitting up in chair throughout most of day  Pt educated on amb 2-3x per day with assistance from staff and increased movement during hospital stay.  All questions answered within the scope of PT.  White board updated accordingly.      Patient left up in chair with all lines intact, call button in reach, and spouse present..    GOALS:   Multidisciplinary Problems       Physical Therapy Goals          Problem: Physical Therapy    Goal Priority Disciplines Outcome Goal Variances Interventions   Physical Therapy Goal     PT, PT/OT Ongoing, Progressing     Description: Goals to be met by: 24     Patient will increase functional independence with mobility by performin. Supine to sit with Set-up  [place]  2. Rolling to Left and Right with Set-up Assistance.  3. Sit to stand transfer with Modified [place]  4. Bed to chair transfer with Modified [place] using Rolling Walker  5. Gait  x 150 feet with Supervision using Rolling Walker.   6. Ascend/descend 2 stair with no Handrails Contact Guard Assistance using No Assistive Device.                          Time Tracking:     PT Received On: 03/22/24  PT Start Time: 1119     PT Stop Time: 1130  PT Total Time (min): 11 min     Billable Minutes: Gait Training 11    Treatment Type: Treatment  PT/PTA: PT           03/22/2024

## 2024-03-22 NOTE — NURSING
Nurses Note -- 4 Eyes      3/22/2024   12:46 AM      Skin assessed during: Q Shift Change      [x] No Altered Skin Integrity Present    []Prevention Measures Documented      [] Yes- Altered Skin Integrity Present or Discovered   [] LDA Added if Not in Epic (Describe Wound)   [] New Altered Skin Integrity was Present on Admit and Documented in LDA   [] Wound Image Taken    Wound Care Consulted? No    Attending Nurse:  Venice Blackwell RN/Staff Member:   BOLIVAR Pulido

## 2024-03-22 NOTE — PLAN OF CARE
Pt VS stable. Ambulation in room and to bathroom. C-collar in place. Pt states minimal pain, neuro intact. Hemovac in place to suction. Calll light within reach. Will continue plan of care    Problem: Adult Inpatient Plan of Care  Goal: Plan of Care Review  Outcome: Ongoing, Progressing  Goal: Patient-Specific Goal (Individualized)  Outcome: Ongoing, Progressing  Goal: Absence of Hospital-Acquired Illness or Injury  Outcome: Ongoing, Progressing  Goal: Optimal Comfort and Wellbeing  Outcome: Ongoing, Progressing  Goal: Readiness for Transition of Care  Outcome: Ongoing, Progressing     Problem: Bariatric Environmental Safety  Goal: Safety Maintained with Care  Outcome: Ongoing, Progressing

## 2024-03-22 NOTE — PLAN OF CARE
Problem: Adult Inpatient Plan of Care  Goal: Plan of Care Review  Outcome: Ongoing, Progressing  Goal: Patient-Specific Goal (Individualized)  Outcome: Ongoing, Progressing  Goal: Absence of Hospital-Acquired Illness or Injury  Outcome: Ongoing, Progressing  Goal: Optimal Comfort and Wellbeing  Outcome: Ongoing, Progressing  Goal: Readiness for Transition of Care  Outcome: Ongoing, Progressing     Problem: Bariatric Environmental Safety  Goal: Safety Maintained with Care  Outcome: Ongoing, Progressing       Pt tolerated meds and meals , pt up with PT in chair , family at bedside throughout the day , IV site wnl , hemovac with 50 ml sanginous drainage noted , vss , see mar for pain med admin

## 2024-03-22 NOTE — ASSESSMENT & PLAN NOTE
Ina Blair is a 53-year-old female with hydrocephalus status post  shunt (Codman Hakim set at 100) and lumbar stenosis with lumbar radiculopathy status post L4-5 TLIF who presented with low back pain and bilateral L3 radiculopathy right-sided more than left.  Imaging shows adjacent level disease at L3-4. She also has worsening myelopathy. Now s/p C3-6 PCF.    - Neurologically stable  - Pain control: continue current regimen   - Continue dressing to incision. Discussed proper wound care.   - Drains: continue HV drain.   - Post op imaging with satisfactory hardware placement.   - Brace: collar when upright/OOB  - Continue PT/OT/OOB. OOB > 6hrs/day  - GI: Diet as tolerated. Continue bowel regimen.   - Voiding spontaneously  - DVT ppx: LVX, Compression stockings, SCDs  - Atelectasis ppx: IS at bedside. Encourage use every hour while awake.  - Discussed with Dr. Meek    - Dispo: Pending drain removal

## 2024-03-22 NOTE — DISCHARGE INSTRUCTIONS
Post op Spine Patient Instructions    Activity Restrictions:  [x]  Return to work will be determined on an individual basis.  [x]  No lifting greater than 5-10 pounds.  [x]  Avoid bending and twisting the area of your surgery more than 45 degrees from neutral position in any direction.  [x]  No driving or operating machinery:  [x]  until cleared by your surgeon.  [x]  while taking narcotic pain medications or muscle relaxants.  [x]  Wear your brace at all times except when lying in bed, showering, eating.  [x]  Increase ambulation over the next 2 weeks. Walk on paved surfaces only. It is okay to walk up and down stairs while holding onto a side rail.    Discharge Medication/Follow-up:  [x]  Please refer to discharge medication reconciliation form.  [x]  Take Tylenol (acetaminophen) 650 mg every 6 hours as needed for additional pain control.  [x]  Do not take ANY Aspirin or Aspirin containing products for 2 weeks after surgery (unless otherwise directed in discharge medication list).   [x]  Do not take ANY herbal supplements for 2 weeks after surgery.    [x]  Do not take ANY non-steroidal anti-inflammatory drugs (NSAIDS), including the following: ibuprofen, naprosyn, Aleve, Advil, Indocin, Mobic, or Celebrex for 12 weeks after surgery.   [x]  Prescriptions for appropriate medication will be given upon discharge.  [x]  Take the pain medication as prescribed. We recommend to wean use of your pain medication after 1 week of taking as prescribed (Ex: After 1 week of taking every 4 hours as needed, wean down to taking your pain medication every 6 hours as needed).  [x]  Take docusate (Colace 100 mg): take one capsule a day as needed for constipation. You can get this over the counter.  [x]  Follow-up appointment:  [x]  14 days post-op for wound check/staple removal  [x]  6 weeks with XR cervical spine  [x]  12 weeks with CT cervical spine  Future Appointments   Date Time Provider Department Center   3/27/2024 11:30 AM  Anastacia Ritchie, NP [facility] NEUROS8 [facility]         Wound Care:  [x]  Remove the small dressings near your incision in 2 days.   [x]  No bandage required. Keep your incision open to the air.   [x]  You may shower on the 2nd day after your surgery. Keep the incision clean and dry at all times. Do not allow the force of water to hit the incision. If the incision gets damp, pat it dry. Do not rub or scrub the incision.  [x]  You cannot take a bath until 8 weeks after surgery.      Call your doctor or go to the Emergency Room for any signs of infection, including: increased redness, drainage, pain, or fever (temperature ?101). Call your doctor or go to the Emergency Room if there are any localized neurological changes; problems with speech, vision, numbness, tingling, weakness, or severe headache; inability to control urination or bowel movements; inability to urinate; or for other concerns.      Special Instructions:  [x]  No use of tobacco products.  [x]  Diet: Please eat a regular diet as tolerated.      Physicians need 3 days' notice for pain medicine to be refilled. Pain medicine will only be refilled between 8 AM and 5 PM, Monday through Friday, due to Food and Drug Administration regulation of documentation.    If you have any questions about this form, please call [phone].    Form No. 00644 (Revised 10/31/2013)

## 2024-03-22 NOTE — PROGRESS NOTES
[facility]  Neurosurgery  Progress Note    Subjective:     History of Present Illness: Ina Blair is a 53 y.o.  female with history of hydrocephalus status post shunt and prior L4-5 TLIF who presented with low back pain as well as bilateral radicular thigh pain which progressively getting worse.  Back pain is aggravated with standing sitting or walking.  Pain got better when she sat down and rested.  She can not walk more than 1 block without pain.  Leaning forward help with pain.  Pain radiates to the anterior aspect of both thighs.  At the knees.  She also reports some pain that radiates to the buttock to the posterior aspect of her thighs especially when she walks for long distances.  She reports some urinary incontinence.  Upon further questioning she also reports some difficulty ambulating lately with unsteady gait as well as she has been dropping some objects.  She failed conservative management including multimodal pain medications, injections, and physical therapy.     Post-Op Info:  Procedure(s) (LRB):  DECOMPRESSION, SPINE, CERVICAL, POSTERIOR APPROACH (N/A)   2 Days Post-Op   Interval History:  NAEON. AFVSS. C/o neck pain and headache. Exam non-focal. Voiding. Ambulated in halls yesterday. Continue HV drain. Anticipate dc home tomorrow.    Medications:  Continuous Infusions:  Scheduled Meds:   amLODIPine  2.5 mg Oral Daily    ceFAZolin (Ancef) IV (PEDS and ADULTS)  1 g Intravenous Q8H    diazePAM  5 mg Oral Q8H    enoxparin  40 mg Subcutaneous Q24H (prophylaxis, 1700)    EScitalopram oxalate  20 mg Oral Daily    gabapentin  400 mg Oral TID    losartan-hydrochlorothiazide 50-12.5 mg  1 tablet Oral Daily    polyethylene glycol  17 g Oral Daily     PRN Meds:acetaminophen, bisacodyL, dextrose 10%, dextrose 10%, glucose, glucose, HYDROmorphone, labetalol, melatonin, methocarbamoL, ondansetron, oxyCODONE, oxyCODONE     Review of Systems  Objective:     Weight: 102.6 kg (226 lb 3.1 oz)  Body  mass index is 38.81 kg/m².  Vital Signs (Most Recent):  Temp: 98.3 °F (36.8 °C) (03/22/24 0829)  Pulse: (!) 58 (03/22/24 0829)  Resp: 17 (03/22/24 0832)  BP: (!) 185/77 (03/22/24 0829)  SpO2: (!) 94 % (03/22/24 0900) Vital Signs (24h Range):  Temp:  [97.3 °F (36.3 °C)-98.3 °F (36.8 °C)] 98.3 °F (36.8 °C)  Pulse:  [57-67] 58  Resp:  [16-18] 17  SpO2:  [92 %-97 %] 94 %  BP: (138-185)/(62-77) 185/77                                Closed/Suction Drain 03/20/24 1932 Tube - 1 Posterior Neck Accordion 10 Fr. (Active)   Site Description Healing 03/20/24 2245   Dressing Type Other (Comment) 03/20/24 2245   Dressing Status Clean;Dry;Intact 03/20/24 2245   Dressing Intervention First dressing 03/20/24 2245   Drainage Sanguineous 03/20/24 2245   Status Other (Comment) 03/20/24 2245   Output (mL) 100 mL 03/21/24 0515         Physical Exam         Neurosurgery Physical Exam    General: well developed, well nourished, no distress.   Head: normocephalic, atraumatic  Neurologic: Alert and oriented. Thought content appropriate.  GCS: Motor: 6/Verbal: 5/Eyes: 4 GCS Total: 15  Mental Status: Awake, Alert, Oriented x 4  Language: No aphasia  Speech: No dysarthria  Cranial nerves: face symmetric, tongue midline, CN II-XII grossly intact.   Eyes: pupils equal, round, reactive to light with accomodation, EOMI.   Pulmonary: normal respirations, no signs of respiratory distress  Abdomen: soft, non-distended, not tender to palpation  Sensory: intact to light touch throughout  Motor Strength: Moves all extremities spontaneously with good tone.  Full strength upper and lower extremities. No abnormal movements seen.     Strength  Deltoids Triceps Biceps Wrist Extension Wrist Flexion Hand    Upper: R 5/5 5/5 5/5 5/5 5/5 5/5    L 5/5 5/5 5/5 5/5 5/5 5/5     Iliopsoas Quadriceps Knee  Flexion Tibialis  anterior Gastro- cnemius EHL   Lower: R 5/5 5/5 5/5 5/5 5/5 5/5    L 5/5 5/5 5/5 5/5 5/5 5/5     Skin: Skin is warm, dry and intact.  Incision  "covered with dressing, drain intact.   HV intact to suction.    Significant Labs:  Recent Labs   Lab 03/21/24  0515 03/22/24 0422   * 92    141   K 4.5 3.8    106   CO2 23 28   BUN 9 7   CREATININE 0.6 0.6   CALCIUM 8.6* 8.7       Recent Labs   Lab 03/21/24  0515 03/22/24  0422   WBC 9.09 11.93   HGB 13.3 12.5   HCT 39.9 37.6    349       No results for input(s): "LABPT", "INR", "APTT" in the last 48 hours.  Microbiology Results (last 7 days)       ** No results found for the last 168 hours. **          Recent Lab Results         03/22/24 0422        Anion Gap 7       Baso # 0.02       Basophil % 0.2       BUN 7       Calcium 8.7       Chloride 106       CO2 28       Creatinine 0.6       Differential Method Automated       eGFR >60.0       Eos # 0.0       Eos % 0.3       Glucose 92       Gran # (ANC) 7.3       Gran % 61.0       Hematocrit 37.6       Hemoglobin 12.5       Immature Grans (Abs) 0.03  Comment: Mild elevation in immature granulocytes is non specific and   can be seen in a variety of conditions including stress response,   acute inflammation, trauma and pregnancy. Correlation with other   laboratory and clinical findings is essential.         Immature Granulocytes 0.3       Lymph # 3.7       Lymph % 30.6       MCH 30.0       MCHC 33.2       MCV 90       Mono # 0.9       Mono % 7.6       MPV 9.5       nRBC 0       Platelet Count 349       Potassium 3.8       RBC 4.16       RDW 13.6       Sodium 141       WBC 11.93             All pertinent labs from the last 24 hours have been reviewed.    Significant Diagnostics:  I have reviewed all pertinent imaging results/findings within the past 24 hours.  Assessment/Plan:     * Cervical spondylosis with myelopathy  Ina Blair is a 53-year-old female with hydrocephalus status post  shunt (Codman Hakim set at 100) and lumbar stenosis with lumbar radiculopathy status post L4-5 TLIF who presented with low back pain and bilateral " L3 radiculopathy right-sided more than left.  Imaging shows adjacent level disease at L3-4. She also has worsening myelopathy. Now s/p C3-6 PCF.    - Neurologically stable  - Pain control: continue current regimen   - Continue dressing to incision. Discussed proper wound care.   - Drains: continue HV drain.   - Post op imaging with satisfactory hardware placement.   - Brace: collar when upright/OOB  - Continue PT/OT/OOB. OOB > 6hrs/day  - GI: Diet as tolerated. Continue bowel regimen.   - Voiding spontaneously  - DVT ppx: LVX, Compression stockings, SCDs  - Atelectasis ppx: IS at bedside. Encourage use every hour while awake.  - Discussed with Dr. Meek    - Dispo: Pending drain removal      Primary hypertension  Chronic, controlled. Latest blood pressure and vitals reviewed-     Temp:  [96 °F (35.6 °C)-98.5 °F (36.9 °C)]   Pulse:  [57-80]   Resp:  [13-20]   BP: (117-171)/(56-87)   SpO2:  [92 %-99 %] .   Home meds for hypertension were reviewed and noted below.   Hypertension Medications               amLODIPine (NORVASC) 2.5 MG tablet TAKE ONE TABLET BY MOUTH DAILY - MAY TAKE 2 TABLETS IF BLOOD PRESSURE IS OVER 160    losartan-hydrochlorothiazide 50-12.5 mg (HYZAAR) 50-12.5 mg per tablet Take 1 tablet by mouth once daily.            While in the hospital, will manage blood pressure as follows; Continue home antihypertensive regimen    Will utilize p.r.n. blood pressure medication only if patient's blood pressure greater than 180/110 and she develops symptoms such as worsening chest pain or shortness of breath.        Jossy Tracy PA-C  Neurosurgery  [facility]

## 2024-03-22 NOTE — TELEPHONE ENCOUNTER
[facility] patient    Rounded on patient earlier, laying in bed,  at bedside. Still has drains. Good pain control reported.   DC plan to [facility] when medically stable.    Informed patient and  bedside delivery for discharge medications.    Future Appointments   Date Time Provider Department Center   3/27/2024 11:30 AM Anastacia Ritchie, NP [facility] NEUROS8 [facility], RN, CMSRN  RN Navigator  [facility]   [phone]  Fax [phone]

## 2024-03-23 VITALS
DIASTOLIC BLOOD PRESSURE: 67 MMHG | BODY MASS INDEX: 38.62 KG/M2 | RESPIRATION RATE: 18 BRPM | TEMPERATURE: 99 F | SYSTOLIC BLOOD PRESSURE: 114 MMHG | OXYGEN SATURATION: 92 % | HEIGHT: 64 IN | HEART RATE: 61 BPM | WEIGHT: 226.19 LBS

## 2024-03-23 PROCEDURE — 63600175 PHARM REV CODE 636 W HCPCS: Performed by: STUDENT IN AN ORGANIZED HEALTH CARE EDUCATION/TRAINING PROGRAM

## 2024-03-23 PROCEDURE — 99024 POSTOP FOLLOW-UP VISIT: CPT | Mod: COE,,, | Performed by: PHYSICIAN ASSISTANT

## 2024-03-23 PROCEDURE — 25000003 PHARM REV CODE 250: Performed by: STUDENT IN AN ORGANIZED HEALTH CARE EDUCATION/TRAINING PROGRAM

## 2024-03-23 RX ADMIN — OXYCODONE HYDROCHLORIDE 10 MG: 10 TABLET ORAL at 06:03

## 2024-03-23 RX ADMIN — DEXTROSE MONOHYDRATE 1 G: 2.5 INJECTION INTRAVENOUS at 06:03

## 2024-03-23 RX ADMIN — METHOCARBAMOL 750 MG: 750 TABLET ORAL at 10:03

## 2024-03-23 RX ADMIN — AMLODIPINE BESYLATE 2.5 MG: 2.5 TABLET ORAL at 08:03

## 2024-03-23 RX ADMIN — DIAZEPAM 5 MG: 5 TABLET ORAL at 01:03

## 2024-03-23 RX ADMIN — GABAPENTIN 400 MG: 300 CAPSULE ORAL at 08:03

## 2024-03-23 RX ADMIN — DIAZEPAM 5 MG: 5 TABLET ORAL at 06:03

## 2024-03-23 RX ADMIN — LOSARTAN POTASSIUM AND HYDROCHLOROTHIAZIDE 1 TABLET: 50; 12.5 TABLET, FILM COATED ORAL at 08:03

## 2024-03-23 RX ADMIN — ACETAMINOPHEN 650 MG: 325 TABLET ORAL at 08:03

## 2024-03-23 RX ADMIN — POLYETHYLENE GLYCOL 3350 17 G: 17 POWDER, FOR SOLUTION ORAL at 08:03

## 2024-03-23 RX ADMIN — OXYCODONE HYDROCHLORIDE 10 MG: 10 TABLET ORAL at 12:03

## 2024-03-23 RX ADMIN — METHOCARBAMOL 750 MG: 750 TABLET ORAL at 03:03

## 2024-03-23 RX ADMIN — ESCITALOPRAM OXALATE 20 MG: 20 TABLET ORAL at 08:03

## 2024-03-23 NOTE — DISCHARGE SUMMARY
[facility]  Neurosurgery  Discharge Summary      Patient Name: Ina Blair  MRN: [id number]  Admission Date: 3/20/2024  Hospital Length of Stay: 3 days  Discharge Date and Time:  03/23/2024 12:33 PM  Attending Physician: Quentin Meek MD   Discharging Provider: Patricia Jackson PA-C  Primary Care Provider: K[facility] Primary Doctor    HPI:   Ina Blair is a 53 y.o.  female with history of hydrocephalus status post shunt and prior L4-5 TLIF who presented with low back pain as well as bilateral radicular thigh pain which progressively getting worse.  Back pain is aggravated with standing sitting or walking.  Pain got better when she sat down and rested.  She can not walk more than 1 block without pain.  Leaning forward help with pain.  Pain radiates to the anterior aspect of both thighs.  At the knees.  She also reports some pain that radiates to the buttock to the posterior aspect of her thighs especially when she walks for long distances.  She reports some urinary incontinence.  Upon further questioning she also reports some difficulty ambulating lately with unsteady gait as well as she has been dropping some objects.  She failed conservative management including multimodal pain medications, injections, and physical therapy.     Procedure(s) (LRB):  DECOMPRESSION, SPINE, CERVICAL, POSTERIOR APPROACH (N/A)     Hospital Course: The patient presented to [facility] on 3/20/24 for C3-C6 PCF with Dr. Meek. The patient tolerated the procedure well and there were no intra-operative complications. After surgery, the patient was extubated and taken to recovery in stable condition.  After observation in recovery, the patient was transferred to the neurosurgical floor.  Post-op X-rays showed good placement of the hardware. PT/OT were consulted, the patient progressed, and was cleared to go home.  Drain was removed on 3/23/24 without complication.  On 3/23/24 she was discharged home with pain medication and  follow up appointments. The patient was tolerating a regular diet and voiding without difficulty. Activity as tolerated. At the time of discharge, the patient was neurologically stable, was afebrile, and vital signs were stable. Discharge instructions were given verbally/written to the patient and their family, including wound care and follow-up appointments, and all of their questions were answered. The patient was also given a discharge instruction sheet explaining the aforementioned discussion.  The patient verbalized understanding of instructions and agreed to the plan. They were encouraged to call the clinic with any questions they might have prior to the follow up appointments.     Hospital Course:  3/21: NAEON. Afvss. C/o neck pain. Ambulated with therapy. Voiding spontaneously. Exam stable. Continue HV drain.  3/22: NAEON. AFVSS. C/o neck pain and headache. Exam non-focal. Voiding. Ambulated in halls yesterday. Continue HV drain. Anticipate dc home tomorrow.  3/23: NAEON. AFVSS. Neurologically stable. HV drain removed. Tolerating PO diet and voiding spontaneously. Medically stable to discharge home. Follow up in clinic in 2 weeks. Discharge instructions given verbally to patient. All of her questions were answered. She was encouraged to call the clinic with any questions or concerns prior to follow up appt.     Physical Exam 3/23/24:  General: well developed, well nourished, no distress.   Head: normocephalic, atraumatic  Neurologic: Alert and oriented. Thought content appropriate.  GCS: Motor: 6/Verbal: 5/Eyes: 4 GCS Total: 15  Mental Status: Awake, Alert, Oriented x 4  Language: No aphasia  Speech: No dysarthria  Cranial nerves: face symmetric, tongue midline, CN II-XII grossly intact.   Eyes: pupils equal, round, reactive to light with accomodation, EOMI.   Pulmonary: normal respirations, no signs of respiratory distress  Sensory: intact to light touch throughout  Motor Strength: Moves all extremities  spontaneously with good tone.  Full strength upper and lower extremities. No abnormal movements seen.   Strength   Deltoids Triceps Biceps Wrist Extension Wrist Flexion Hand    Upper: R 5/5 5/5 5/5 5/5 5/5 5/5     L 5/5 5/5 5/5 5/5 5/5 5/5       Iliopsoas Quadriceps Knee  Flexion Tibialis  anterior Gastro- cnemius EHL   Lower: R 5/5 5/5 5/5 5/5 5/5 5/5     L 5/5 5/5 5/5 5/5 5/5 5/5     Skin: Skin is warm, dry and intact.  Incision: Clean, dry, staples and nylon sutures intact. Skin edges well approximated. No surrounding erythema or edema. No drainage or TTP.         Goals of Care Treatment Preferences:  Code Status: Full Code      Consults:     Significant Diagnostic Studies: Labs: BMP:   Recent Labs   Lab 03/22/24 0422   GLU 92      K 3.8      CO2 28   BUN 7   CREATININE 0.6   CALCIUM 8.7   , CBC   Recent Labs   Lab 03/22/24 0422   WBC 11.93   HGB 12.5   HCT 37.6      , and All labs within the past 24 hours have been reviewed  Radiology: X-Ray:  cervical AP/lat    Pending Diagnostic Studies:       None          Final Active Diagnoses:    Diagnosis Date Noted POA    PRINCIPAL PROBLEM:  Cervical spondylosis with myelopathy [M47.12] 03/21/2024 Yes    Class 2 obesity due to excess calories with body mass index (BMI) of 38.0 to 38.9 in adult [E66.09, Z68.38] 02/20/2024 Not Applicable    Primary hypertension [I10] 10/26/2023 Yes      Problems Resolved During this Admission:      Discharged Condition: stable     Disposition: Home or Self Care    Follow Up:  Future Appointments   Date Time Provider Department Center   3/27/2024 11:30 AM Anastacia Ritchie NP [facility] NEUROS8 [facility]         Patient Instructions:      Notify your health care provider if you experience any of the following:  increased confusion or weakness     Notify your health care provider if you experience any of the following:  persistent dizziness, light-headedness, or visual disturbances     Notify your health care provider if  you experience any of the following:  worsening rash     Notify your health care provider if you experience any of the following:  severe persistent headache     Notify your health care provider if you experience any of the following:  difficulty breathing or increased cough     Notify your health care provider if you experience any of the following:  redness, tenderness, or signs of infection (pain, swelling, redness, odor or green/yellow discharge around incision site)     Notify your health care provider if you experience any of the following:  severe uncontrolled pain     Notify your health care provider if you experience any of the following:  persistent nausea and vomiting or diarrhea     Notify your health care provider if you experience any of the following:  temperature >100.4     Activity as tolerated     Medications:  Reconciled Home Medications:      Medication List        START taking these medications      methocarbamoL 750 MG Tab  Commonly known as: ROBAXIN  Take 1 tablet (750 mg total) by mouth every 6 (six) hours as needed (muscle spasms).            CHANGE how you take these medications      oxyCODONE 10 mg Tab immediate release tablet  Commonly known as: ROXICODONE  Take 1 tablet (10 mg total) by mouth every 6 (six) hours as needed for Pain.  What changed:   medication strength  how much to take  when to take this  reasons to take this  additional instructions            CONTINUE taking these medications      acetaminophen 500 MG tablet  Commonly known as: TYLENOL  Take 1,000 mg by mouth every 8 (eight) hours as needed for Pain.     ALPRAZolam 0.5 MG tablet  Commonly known as: XANAX  Take 0.5 mg by mouth 2 (two) times daily as needed.     amLODIPine 2.5 MG tablet  Commonly known as: NORVASC  TAKE ONE TABLET BY MOUTH DAILY - MAY TAKE 2 TABLETS IF BLOOD PRESSURE IS OVER 160     EScitalopram oxalate 20 MG tablet  Commonly known as: LEXAPRO  Take 20 mg by mouth.     gabapentin 400 MG capsule  Commonly  known as: NEURONTIN  Take 400 mg by mouth 3 (three) times daily.     lisdexamfetamine 20 MG capsule  Commonly known as: VYVANSE  Take 20 mg by mouth every morning.     losartan-hydrochlorothiazide 50-12.5 mg 50-12.5 mg per tablet  Commonly known as: HYZAAR  Take 1 tablet by mouth once daily.     MOUNJARO 2.5 mg/0.5 mL Pnij  Generic drug: tirzepatide  Inject 2.5 mg into the skin once a week.     naloxone 4 mg/actuation Spry  Commonly known as: NARCAN  4 mg by Nasal route daily as needed.            STOP taking these medications      ibuprofen 800 MG tablet  Commonly known as: BALA KING PA-C  Neurosurgery  [facility] - Surgery

## 2024-03-23 NOTE — PLAN OF CARE
[facility]  Discharge Final Note    Primary Care Provider: No, Primary Doctor    Expected Discharge Date: 3/23/2024    Patient to discharge.  The patient does not have any needs.  Neurosurgery clinic to schedule follow up appointment.    Future Appointments   Date Time Provider Department Center   3/27/2024 11:30 AM Anastacia Ritchie NP [facility] NE[facility]        Final Discharge Note (most recent)       Final Note - 03/23/24 0851          Final Note    Assessment Type Final Discharge Note     Anticipated Discharge Disposition Home or Self Care        Post-Acute Status    Post-Acute Authorization Other     Other Status No Post-Acute Service Needs     Discharge Delays None known at this time                     Important Message from Medicare

## 2024-03-23 NOTE — PLAN OF CARE
Problem: Adult Inpatient Plan of Care  Goal: Plan of Care Review  Outcome: Met  Goal: Patient-Specific Goal (Individualized)  Outcome: Met  Goal: Absence of Hospital-Acquired Illness or Injury  Outcome: Met  Goal: Optimal Comfort and Wellbeing  Outcome: Met  Goal: Readiness for Transition of Care  Outcome: Met     Problem: Bariatric Environmental Safety  Goal: Safety Maintained with Care  Outcome: Met     Patient discharged via wheelchair with all personal belongings to [facility] accompanied by . In stable condition with VSS, in no apparent distress. Discharge teaching complete, questions answered, patient and spouse verbalized understanding. Medications delivered by bedside pharmacy. IV d/c'd, catheter tip intact.

## 2024-03-24 NOTE — OP NOTE
DATE OF PROCEDURE: 3/20/24    PREOPERATIVE DIAGNOSIS:  1. Degenerative cervical myelopathy with stensosis, C3-6     POSTOPERATIVE DIAGNOSIS:  Same.     PROCEDURE PERFORMED:  1. Posterior spinal fusion, C3-C6  2. Posterior segmental spinal fixation, C3-C6 (Depuy)  3. Decompression of thecal sac and nerve roots via laminectomy, C3-6  4. Use of intraoperative flouroscopy  5. Use of intraoperative neuromonitoring with MEPs  6. Local bone grafting     PRIMARY SURGEON: Quentin Meek M.D.    ASSISTANT: Hipolito Carrasco M.D.     ANESTHESIA: GETA     ESTIMATED BLOOD LOSS: Minimal     COMPLICATIONS: None     DRAINS: One deep.     SPECIMENS SENT: None     FINDINGS: None     INDICATIONS:     53F with s/s of progressive degenerative cervical myelopathy in the setting of C3-C6 stenosis. I recommended the above procedure. I explained the risks, benefits, alternatives, indications and methods of the procedure in detail. The patient voiced understanding and all questions were answered. No guarantees were made. The patient agreed to proceed as planned.    PROCEDURE:     The patient was brought into the operating room where she was intubated and placed under general anesthesia without difficulty. All lines were placed.  A Gore clamp was placed bitemporally and she was repositioned prone onto the hospital bed with the head fixed to the table in capital flexion and neck extension. Appropriate padding of all pressure points was placed.  Xrays were used to localize the region of interest. It also showed adequate spinal alignment in the sagittal plane. The posterior cervical spine was marked prepped and draped in the usual sterile fashion.  A timeout was performed prior to the procedure.  10mL of Lidocaine with epinephrine were injected in the skin.     A linear incision was made with a 10 blade from approximately C3-C6.  Supra and subfascial dissection was carried out in the midline with Bovie electrocautery. Subfascial dissection was  carried out with Bovie electrocautery and Crowder elevators to expose the posterior elements from C3-C6. Levels were confirmed with lateral xray.     First, lateral mass screw tracts were prepared bilaterally from C3-C6 using freehand technique and anatomic landmarks. A decompressive laminectomy was then performed from C3-C6 using the Leksell rongeur, high speed drill and Kerrison punches. Adequate decompression was confirmed with the Benton probe.     Lateral mass screws were then placed in the previously prepared tracts. AP and lateral xray showed excellent position of all screws. Titanium rods were sized, contoured and reduced into the tulip heads. Set screws were finally tightened. Final xrays showed excellent spinal alignment and hardware position.     The wound was copiously irrigated with a dilute Betadine solution and normal saline.   Exposed bony surfaces from C3-C6 were decorticated with a high-speed drill.  Local bone from the laminectomy was morcellized and placed bilaterally for arthrodesis from C3-C6.  One gram of vancomycin powder was placed into the wound. A subfascial Hemovac drains was placed and tunneled through a separate incision, where it was secured with Vicryl sutures.  A watertight fascial closure was achieved with interrupted 0 Ethibond sutures and a running Stratafix suture.  The soft tissues were closed in layers and the skin was closed with staples.  A silver nitrate dressing was applied.     The patient appeared to tolerate the procedure well from a hemodynamic and neuromonitoring standpoint.  Motor evoked potentials were present and stable in all extremities throughout the case. I was present for all critical portions of the case, and at the end of the case all counts were correct. She was removed from the Gore clamp and repositioned supine onto the hospital bed where she was extubated and allowed to emerge from anesthesia without difficulty.  She was sent to the PACU in stable  condition for recovery.

## 2024-03-25 ENCOUNTER — PATIENT OUTREACH (OUTPATIENT)
Dept: ADMINISTRATIVE | Facility: CLINIC | Age: 54
End: 2024-03-25
Payer: COMMERCIAL

## 2024-03-25 RX ORDER — DIAZEPAM 2 MG/1
2 TABLET ORAL EVERY 12 HOURS PRN
Qty: 10 TABLET | Refills: 0 | Status: SHIPPED | OUTPATIENT
Start: 2024-03-25 | End: 2024-03-30

## 2024-03-25 NOTE — PROGRESS NOTES
C3 nurse attempted to contact Ina Blair for a TCC post hospital discharge follow up call. No answer. The patient does not have a scheduled HOSFU appointment, and the pt does not have an [facility] PCP.

## 2024-03-25 NOTE — ANESTHESIA POSTPROCEDURE EVALUATION
Anesthesia Post Evaluation    Patient: Ina Blair    Procedure(s) Performed: Procedure(s) (LRB):  DECOMPRESSION, SPINE, CERVICAL, POSTERIOR APPROACH (N/A)    Final Anesthesia Type: general      Patient location during evaluation: PACU  Patient participation: Yes- Able to Participate  Level of consciousness: awake  Post-procedure vital signs: reviewed and stable  Pain management: adequate  Airway patency: patent    PONV status at discharge: No PONV  Anesthetic complications: no      Cardiovascular status: hemodynamically stable  Respiratory status: unassisted, spontaneous ventilation and nasal cannula  Hydration status: euvolemic                Vitals Value   /67   Temp 36.9 °C (98.5 °F)   Pulse 61   Resp 18   SpO2 92 %         Event Time   Out of Recovery 20:45:00         Pain/Kianna Score: No data recorded

## 2024-03-26 NOTE — PROGRESS NOTES
C3 nurse spoke with Ina Blair for a TCC post hospital discharge follow up call.  [place] will see Anastacia Ritchie NP for neuro follow up visit on 03/27/24 @ 1130 for follow up and for clearance to return home to [place].

## 2024-03-27 ENCOUNTER — OFFICE VISIT (OUTPATIENT)
Dept: NEUROSURGERY | Facility: CLINIC | Age: 54
End: 2024-03-27
Payer: COMMERCIAL

## 2024-03-27 VITALS — TEMPERATURE: 97 F | SYSTOLIC BLOOD PRESSURE: 113 MMHG | DIASTOLIC BLOOD PRESSURE: 62 MMHG

## 2024-03-27 DIAGNOSIS — Z98.1 S/P CERVICAL SPINAL FUSION: Primary | ICD-10-CM

## 2024-03-27 PROCEDURE — 99999 PR PBB SHADOW E&M-EST. PATIENT-LVL III: CPT | Mod: PBBFAC,COE,, | Performed by: NURSE PRACTITIONER

## 2024-03-27 PROCEDURE — 99024 POSTOP FOLLOW-UP VISIT: CPT | Mod: S$GLB,COE,, | Performed by: NURSE PRACTITIONER

## 2024-03-27 RX ORDER — OXYCODONE AND ACETAMINOPHEN 10; 325 MG/1; MG/1
1 TABLET ORAL EVERY 6 HOURS PRN
Qty: 56 TABLET | Refills: 0 | Status: SHIPPED | OUTPATIENT
Start: 2024-03-27 | End: 2024-04-10

## 2024-03-27 RX ORDER — TIZANIDINE 4 MG/1
4 TABLET ORAL EVERY 6 HOURS PRN
Qty: 30 TABLET | Refills: 0 | Status: SHIPPED | OUTPATIENT
Start: 2024-03-27 | End: 2024-04-06

## 2024-03-27 NOTE — PROGRESS NOTES
Neurosurgery  Established Patient    SUBJECTIVE:     History of Present Illness: Ina Blair is a 53 y.o. female s/p PCF C3-6 with Dr. Meek on 3/20/24. She is being seen in clinic today for her clearance to travel. States that she continues to struggle with severe neck pain that radiates to her shoulders. Denies fever or chills. She is trying to ambulate daily.     Review of patient's allergies indicates:   Allergen Reactions    Adhesive     Adhesive tape-silicones        Current Outpatient Medications   Medication Sig Dispense Refill    acetaminophen (TYLENOL) 500 MG tablet Take 1,000 mg by mouth every 8 (eight) hours as needed for Pain.      ALPRAZolam (XANAX) 0.5 MG tablet Take 0.5 mg by mouth 2 (two) times daily as needed.      amLODIPine (NORVASC) 2.5 MG tablet TAKE ONE TABLET BY MOUTH DAILY - MAY TAKE 2 TABLETS IF BLOOD PRESSURE IS OVER 160      diazePAM (VALIUM) 2 MG tablet Take 1 tablet (2 mg total) by mouth every 12 (twelve) hours as needed (muscle spasms). 10 tablet 0    EScitalopram oxalate (LEXAPRO) 20 MG tablet Take 20 mg by mouth once daily.      gabapentin (NEURONTIN) 400 MG capsule Take 400 mg by mouth 3 (three) times daily.      lisdexamfetamine (VYVANSE) 20 MG capsule Take 20 mg by mouth every morning.      losartan-hydrochlorothiazide 50-12.5 mg (HYZAAR) 50-12.5 mg per tablet Take 1 tablet by mouth once daily.      methocarbamoL (ROBAXIN) 750 MG Tab Take 1 tablet (750 mg total) by mouth every 6 (six) hours as needed (muscle spasms). 56 tablet 0    naloxone (NARCAN) 4 mg/actuation Spry 4 mg by Nasal route daily as needed.      oxyCODONE-acetaminophen (PERCOCET)  mg per tablet Take 1 tablet by mouth every 6 (six) hours as needed for Pain. 56 tablet 0    tirzepatide (MOUNJARO) 2.5 mg/0.5 mL PnIj Inject 2.5 mg into the skin once a week.      tiZANidine (ZANAFLEX) 4 MG tablet Take 1 tablet (4 mg total) by mouth every 6 (six) hours as needed (muscle spasms). 30 tablet 0     No  current facility-administered medications for this visit.       Past Medical History:   Diagnosis Date    Enlarged thyroid 2/20/2024    Headache     Hydrocephalus     Hypertension     Neuropathy     Unspecified osteoarthritis, unspecified site      Past Surgical History:   Procedure Laterality Date    APPENDECTOMY      bladder stretch      CARPAL TUNNEL RELEASE Bilateral     DECOMPRESSION OF CERVICAL SPINE BY POSTERIOR APPROACH N/A 3/20/2024    Procedure: DECOMPRESSION, SPINE, CERVICAL, POSTERIOR APPROACH;  Surgeon: Quentin Meek MD;  Location: [facility] OR [address] [place] [address];  Service: Neurosurgery;  Laterality: N/A;  [facility] patient  C3-6 PCF  Anesthesia: General  NM: EMg, SEP, MNEP  Rad: C-arm  Brace: Bamberg J  Bed: [facility] slider w gel rolls  Bed position: head to core  [facility]    LAMINECTOMY AND MICRODISCECTOMY LUMBAR SPINE  2021    LUMBAR FUSION  2008    TOTAL HIP ARTHROPLASTY Left 10/31/2023    TOTAL KNEE ARTHROPLASTY Bilateral     VENTRICULOPERITONEAL SHUNT       Family History       Problem Relation (Age of Onset)    Cancer Mother    Heart disease Mother    Hypertension Mother          Social History     Socioeconomic History    Marital status:      Spouse name: Tony    Number of children: 2   Tobacco Use    Smoking status: Former     Types: Cigarettes    Tobacco comments:     Smoked cigarettes 33 years 1/2 pack per day     Social Determinants of Health     Financial Resource Strain: Low Risk  (3/21/2024)    Overall Financial Resource Strain (CARDIA)     Difficulty of Paying Living Expenses: Not very hard   Food Insecurity: No Food Insecurity (3/21/2024)    Hunger Vital Sign     Worried About Running Out of Food in the Last Year: Never true     Ran Out of Food in the Last Year: Never true   Transportation Needs: No Transportation Needs (3/21/2024)    PRAPARE - Transportation     Lack of Transportation (Medical): No     Lack of Transportation (Non-Medical): No   Physical Activity: Inactive (3/21/2024)     Exercise Vital Sign     Days of Exercise per Week: 0 days     Minutes of Exercise per Session: 0 min   Stress: Stress Concern Present (3/21/2024)    [employer] [place] [employer] - Occupational Stress Questionnaire     Feeling of Stress : To some extent   Social Connections: Unknown (3/21/2024)    Social Connection and Isolation Panel [NHANES]     Frequency of Communication with Friends and Family: Three times a week     Frequency of Social Gatherings with Friends and Family: Three times a week     Active Member of Clubs or Organizations: No     Attends Club or Organization Meetings: Never     Marital Status:    Housing Stability: Low Risk  (3/21/2024)    Housing Stability Vital Sign     Unable to Pay for Housing in the Last Year: No     Number of Places Lived in the Last Year: 1     Unstable Housing in the Last Year: No       Review of Systems    OBJECTIVE:     Vital Signs  Temp: 96.8 °F (36 °C)  Pulse: (P) 67  BP: 113/62  There is no height or weight on file to calculate BMI.    Neurosurgery Physical Exam  General: well developed, well nourished, no distress.   Head: normocephalic, atraumatic  Neurologic: Alert and oriented. Thought content appropriate.  GCS: Motor: 6/Verbal: 5/Eyes: 4 GCS Total: 15  Mental Status: Awake, Alert, Oriented x 4  Language: No aphasia  Speech: No dysarthria  Cranial nerves: face symmetric, tongue midline, CN II-XII grossly intact.   Eyes: pupils equal, round, reactive to light with accomodation, EOMI.   Pulmonary: normal respirations, no signs of respiratory distress  Abdomen: soft, non-distended  Skin: Skin is warm, dry and intact. Incision with staples and sutures intact without redness or swelling  Sensory: intact to light touch throughout  Motor Strength:Moves all extremities spontaneously with good tone.  Full strength upper and lower extremities. No abnormal movements seen.         ASSESSMENT/PLAN:   Ina Blair is a 53 y.o. female s/p PCF C3-6 with  Dr. Meek on 3/20/24. She was seen in clinic today for her clearance to travel. I have changed her pain medication as the Roxicodone is not providing her with sustained relief nor the Valium. We will switch her to Zanaflex. She was also instructed:    Activity Restrictions:  [x]  Return to work will be determined on an individual basis.  [x]  No lifting greater than 5-10 pounds.  [x]  Avoid bending and twisting the area of your surgery more than 45 degrees from neutral position in any direction.  [x]  No driving or operating machinery:  [x]  until cleared by your surgeon.  [x]  while taking narcotic pain medications or muscle relaxants.  [x]  Wear your brace at all times except when lying in bed, showering, eating, using the restroom, or performing hygiene tasks.   [x]  Increase ambulation over the next 2 weeks. Walk on paved surfaces only. It is okay to walk up and down stairs while holding onto a side rail.  [x]  No sexual activity for 2 weeks.     Discharge Medication/Follow-up:  [x]  Please refer to discharge medication reconciliation form.  [x]  Take Tylenol (acetaminophen) 650 mg every 6 hours as needed for additional pain control.  [x]  Do not take ANY Aspirin or Aspirin containing products for 2 weeks after surgery (unless otherwise directed in discharge medication list).   [x]  Do not take ANY herbal supplements for 2 weeks after surgery.    [x]  Do not take ANY non-steroidal anti-inflammatory drugs (NSAIDS), including the following: ibuprofen, naprosyn, Aleve, Advil, Indocin, Mobic, or Celebrex for 12 weeks after surgery.   [x]  Prescriptions for appropriate medication will be given upon discharge.  [x]  Take the pain medication as prescribed. We recommend to wean use of your pain medication after 1 week of taking as prescribed (Ex: After 1 week of taking every 4 hours as needed, wean down to taking your pain medication every 6 hours as needed).  [x]  Take docusate (Colace 100 mg): take one capsule a day  as needed for constipation. You can get this over the counter.     Follow Up Appointments: *It is your responsibility to schedule these appointments*  At 2 weeks: -Wound check with your primary care provider (you have staples an nylon sutures in place). The wound should be well healed at 2 weeks and staples/sutures can be removed.  At 6 weeks: -Cervical AP and lateral x-rays and appointment with your primary car provider  At 3 months: -Cervical  CT to evaluate for fusion with your primary care provider     Wound Care:  [x]  No bandage required. Keep your incision open to the air.   [x]  You may shower on the 2nd day after your surgery. Keep the incision clean and dry at all times. Do not allow the force of water to hit the incision. If the incision gets damp, pat it dry. Do not rub or scrub the incision.  [x]  You cannot take a bath until 8 weeks after surgery.  [x]  Apply bacitracin to incision twice a day for 2 weeks.     Call your doctor or go to the Emergency Room for any signs of infection, including: increased redness, drainage, pain, or fever (temperature ?101). Call your doctor or go to the Emergency Room if there are any localized neurological changes; problems with speech, vision, numbness, tingling, weakness, or severe headache; inability to control urination or bowel movements; inability to urinate; or for other concerns.     Special Instructions:  [x]  No use of tobacco products.  [x]  Diet: Please eat a regular diet as tolerated.       HERACLIO Larson  Neurosurgery  [facility].      Note dictated with voice recognition software, please excuse any grammatical errors.

## 2024-03-28 ENCOUNTER — TELEPHONE (OUTPATIENT)
Dept: NEUROSURGERY | Facility: CLINIC | Age: 54
End: 2024-03-28
Payer: COMMERCIAL

## 2024-03-28 NOTE — TELEPHONE ENCOUNTER
CHDA patient, all clinicals with op notes and discharge summary with post op instructions faxed to PCP and [facility]. All discharge orders discussed with patient to wear c-collar at all times except for showering, sleepig and eating, ambulate, discussed pain medicine regimie. Patient verbalized understanding. all appointments linked and bundle closed. Patient will come back in a few months for lower back lumbar revision surgery after neck has healed.    Ashley Araiza RN, CMSRN  RN Navigator  [facility]   [phone]  Fax [phone]

## 2024-04-04 ENCOUNTER — TELEPHONE (OUTPATIENT)
Dept: NEUROSURGERY | Facility: CLINIC | Age: 54
End: 2024-04-04
Payer: COMMERCIAL

## 2024-04-04 NOTE — TELEPHONE ENCOUNTER
Received call from patient regarding a few things. gave her fax number of [phone] to send me her 's FMLA paperwork for processing. Discussed physical therapy as her home provider ordered it. Explained that she has restrictions of no bending/turning more than 45 degrees at this time and no lifting more than 10 pounds and to wear cervical collar at all times except when showering, sleeping or eating. Patient verbalizes understanding.    Ashley Araiza, RN, CMSRN  RN Navigator  [facility]   [phone]  Fax [phone]

## 2024-05-03 ENCOUNTER — TELEPHONE (OUTPATIENT)
Dept: ORTHOPEDICS | Facility: CLINIC | Age: 54
End: 2024-05-03
Payer: COMMERCIAL

## 2024-05-03 NOTE — TELEPHONE ENCOUNTER
Pt had 6-week post-op cervical spine X-rays done today at [facility].   Called PCP office and spoke to Wayne. Left message for care team to mail disc with images; address and phone # given.    Ordering provider/PCP:    Flavia Sam, APRN   [address] [place] [address]   Phone: [phone]   Fax: [phone]

## 2024-05-09 ENCOUNTER — TELEPHONE (OUTPATIENT)
Dept: NEUROSURGERY | Facility: CLINIC | Age: 54
End: 2024-05-09
Payer: COMMERCIAL

## 2024-05-15 ENCOUNTER — TELEPHONE (OUTPATIENT)
Dept: NEUROSURGERY | Facility: CLINIC | Age: 54
End: 2024-05-15
Payer: COMMERCIAL

## 2024-05-15 NOTE — TELEPHONE ENCOUNTER
Called patient to let her know post op xray has been reviewed by Dr. Meek. Patient verbalizes understanding    ----- Message from Quentin Meek MD sent at 5/13/2024  3:30 PM CDT -----  Regarding: RE: CHAD post op xray  Looks good  ----- Message -----  From: Ashley Araiza RN  Sent: 5/13/2024   3:19 PM CDT  To: Quentin Meek MD  Subject: CHAD post op xray                                 CHAD patient 6 week post op xray uploaded. Please review.

## 2024-05-29 ENCOUNTER — PATIENT MESSAGE (OUTPATIENT)
Dept: SPINE | Facility: CLINIC | Age: 54
End: 2024-05-29
Payer: COMMERCIAL

## 2024-06-18 ENCOUNTER — TELEPHONE (OUTPATIENT)
Dept: NEUROSURGERY | Facility: CLINIC | Age: 54
End: 2024-06-18
Payer: COMMERCIAL

## 2024-06-18 NOTE — TELEPHONE ENCOUNTER
Spoke to patient, she had post op cervical CT done last week. Will request imaging via [employer] and have Dr. Meek review. Patient interested in scheduling back surgery in early October.

## 2024-06-20 ENCOUNTER — PATIENT MESSAGE (OUTPATIENT)
Dept: ADMINISTRATIVE | Facility: OTHER | Age: 54
End: 2024-06-20
Payer: COMMERCIAL

## 2024-06-21 ENCOUNTER — TELEPHONE (OUTPATIENT)
Dept: NEUROSURGERY | Facility: CLINIC | Age: 54
End: 2024-06-21
Payer: COMMERCIAL

## 2024-06-25 ENCOUNTER — TELEPHONE (OUTPATIENT)
Dept: NEUROSURGERY | Facility: CLINIC | Age: 54
End: 2024-06-25
Payer: COMMERCIAL

## 2024-06-25 NOTE — TELEPHONE ENCOUNTER
CHAD patient.  Patient would like October 15 for surgery date for lumbar fusion revision. Will submit plan of care to [facility] for 10/13-10/23.     Ashley Araiza, RN, CMSRN  RN Navigator  [facility]   [phone]  Fax [phone]

## 2024-06-25 NOTE — TELEPHONE ENCOUNTER
Called patient to let her know post op CT looks good and OK to schedule lumbar surgery. Gave her dates in October. She will call me back.    Thanks,  Ashley Araiza, RN, CMSRN  RN Navigator  [facility]   [phone]  Fax [phone]          ----- Message from Quentin Meek MD sent at 6/21/2024  9:57 AM CDT -----  Regarding: RE: CHAD- post op CT for ACD and  scheduling for Lumbar fusion  Looks good. Can schedule TLIF  ----- Message -----  From: Ashley Araiza RN  Sent: 6/20/2024   1:49 PM CDT  To: Quentin Meek MD  Subject: CHAD- post op CT for ACD and  scheduling for #    CHAD patient had ACD on 3/20- please see post op CT>  Look at your note on 2/19- she originally came in for lumbar issues- recs for Lumbar fusion but you wanted to do her neck first. She is asking if we can schedule her surgery in early October this year.    Do you need to see her back? or any other images that may be needed? Or is OK to move forward with scheduling TLIF?    Thoughts?  Ashley

## 2024-06-26 ENCOUNTER — TELEPHONE (OUTPATIENT)
Dept: NEUROSURGERY | Facility: CLINIC | Age: 54
End: 2024-06-26
Payer: COMMERCIAL

## 2024-06-26 DIAGNOSIS — Z01.818 PREOP TESTING: Primary | ICD-10-CM

## 2024-06-26 DIAGNOSIS — M47.26 OTHER SPONDYLOSIS WITH RADICULOPATHY, LUMBAR REGION: ICD-10-CM

## 2024-06-26 DIAGNOSIS — M51.36 DDD (DEGENERATIVE DISC DISEASE), LUMBAR: Primary | ICD-10-CM

## 2024-08-29 ENCOUNTER — TELEPHONE (OUTPATIENT)
Dept: NEUROSURGERY | Facility: CLINIC | Age: 54
End: 2024-08-29
Payer: COMMERCIAL

## 2024-08-29 ENCOUNTER — TELEPHONE (OUTPATIENT)
Dept: ORTHOPEDICS | Facility: CLINIC | Age: 54
End: 2024-08-29
Payer: COMMERCIAL

## 2024-08-29 NOTE — TELEPHONE ENCOUNTER
CHAD patient  LVM for patient that we need to move surgery date from 10/15 to 10/16.  Emailed [facility] updated plan of care for dates 10/13-10/23.  Will fax preop clearance request to PCP Flavia Redd NP  [phone]/fax [phone]  Mailed Spinal surgery guide.    Ashley Araiza, RN, CMSRN  RN Navigator  [facility]   [phone]  Fax [phone]

## 2024-08-29 NOTE — TELEPHONE ENCOUNTER
Letter faxed to pcp ( [phone]) advising of the pre-op tests that are needing to be done prior to surgery.

## 2024-09-18 ENCOUNTER — PATIENT MESSAGE (OUTPATIENT)
Dept: ADMINISTRATIVE | Facility: OTHER | Age: 54
End: 2024-09-18
Payer: COMMERCIAL

## 2024-09-25 ENCOUNTER — TELEPHONE (OUTPATIENT)
Dept: NEUROSURGERY | Facility: CLINIC | Age: 54
End: 2024-09-25
Payer: COMMERCIAL

## 2024-09-25 NOTE — TELEPHONE ENCOUNTER
Spoke to patient regarding preop. She did nicotine test this morning. Has preop appt with PCP Flavia Montano NP ph [phone]/fax [phone] next wednesdaty    Future Appointments   Date Time Provider Department Center   10/15/2024 10:30 AM Anastacia Ritchie, NP [facility] NEUROS[address] [place] [address]   10/15/2024  1:00 PM Brian Brennan NP [facility] PREOPC [facility]   10/22/2024 11:30 AM Anastacia Ritchie, NP [facility] NEUROS[address] [place] [address]     Ashley Araiza, RN, CMSRN  RN Navigator  [facility]  Ph [phone]  Fax [phone]

## 2024-10-03 ENCOUNTER — TELEPHONE (OUTPATIENT)
Dept: BARIATRICS | Facility: CLINIC | Age: 54
End: 2024-10-03

## 2024-10-03 NOTE — TELEPHONE ENCOUNTER
Left message for pts PCP asking that they please fax us the note from patients pre-op clearance visit yesterday, as her surgery is on 10-16-24.

## 2024-10-09 ENCOUNTER — TELEPHONE (OUTPATIENT)
Dept: BARIATRICS | Facility: CLINIC | Age: 54
End: 2024-10-09
Payer: COMMERCIAL

## 2024-10-09 NOTE — TELEPHONE ENCOUNTER
Spoke with the nurse at patients pcp office and they said they are sending the clearance note for pt now. Nurse advised that she is working remote, so she is sending it by quick release fax and sometimes those are not received. She advised that if we dont receive it in two hours ( by 12:00 pm), to call her back and she will have the office hard fax it to us.

## 2024-10-14 ENCOUNTER — TELEPHONE (OUTPATIENT)
Dept: INTERNAL MEDICINE | Facility: CLINIC | Age: 54
End: 2024-10-14
Payer: COMMERCIAL

## 2024-10-14 ENCOUNTER — TELEPHONE (OUTPATIENT)
Dept: BARIATRICS | Facility: CLINIC | Age: 54
End: 2024-10-14
Payer: COMMERCIAL

## 2024-10-14 DIAGNOSIS — M54.9 DORSALGIA, UNSPECIFIED: Primary | ICD-10-CM

## 2024-10-15 ENCOUNTER — HOSPITAL ENCOUNTER (OUTPATIENT)
Dept: RADIOLOGY | Facility: HOSPITAL | Age: 54
Discharge: HOME | End: 2024-10-15
Attending: NEUROLOGICAL SURGERY
Payer: COMMERCIAL

## 2024-10-15 ENCOUNTER — OFFICE VISIT (OUTPATIENT)
Dept: NEUROSURGERY | Facility: CLINIC | Age: 54
End: 2024-10-15
Payer: COMMERCIAL

## 2024-10-15 ENCOUNTER — ANESTHESIA EVENT (OUTPATIENT)
Dept: SURGERY | Facility: HOSPITAL | Age: 54
DRG: 427 | End: 2024-10-15
Payer: COMMERCIAL

## 2024-10-15 ENCOUNTER — OFFICE VISIT (OUTPATIENT)
Dept: INTERNAL MEDICINE | Facility: CLINIC | Age: 54
End: 2024-10-15
Payer: COMMERCIAL

## 2024-10-15 ENCOUNTER — HOSPITAL ENCOUNTER (OUTPATIENT)
Dept: RADIOLOGY | Facility: HOSPITAL | Age: 54
Discharge: HOME | End: 2024-10-15
Attending: NURSE PRACTITIONER
Payer: COMMERCIAL

## 2024-10-15 VITALS
WEIGHT: 221.69 LBS | HEIGHT: 64 IN | HEART RATE: 60 BPM | DIASTOLIC BLOOD PRESSURE: 57 MMHG | BODY MASS INDEX: 37.85 KG/M2 | TEMPERATURE: 98 F | SYSTOLIC BLOOD PRESSURE: 98 MMHG | OXYGEN SATURATION: 97 %

## 2024-10-15 VITALS
DIASTOLIC BLOOD PRESSURE: 68 MMHG | SYSTOLIC BLOOD PRESSURE: 109 MMHG | BODY MASS INDEX: 36.88 KG/M2 | WEIGHT: 214.94 LBS | TEMPERATURE: 97 F | HEART RATE: 54 BPM

## 2024-10-15 DIAGNOSIS — I10 PRIMARY HYPERTENSION: ICD-10-CM

## 2024-10-15 DIAGNOSIS — Z98.2 S/P VP SHUNT: ICD-10-CM

## 2024-10-15 DIAGNOSIS — E04.9 ENLARGED THYROID: ICD-10-CM

## 2024-10-15 DIAGNOSIS — E66.09 CLASS 2 OBESITY DUE TO EXCESS CALORIES WITH BODY MASS INDEX (BMI) OF 38.0 TO 38.9 IN ADULT, UNSPECIFIED WHETHER SERIOUS COMORBIDITY PRESENT: ICD-10-CM

## 2024-10-15 DIAGNOSIS — E87.6 HYPOKALEMIA: Primary | ICD-10-CM

## 2024-10-15 DIAGNOSIS — G89.29 CHRONIC LOW BACK PAIN, UNSPECIFIED BACK PAIN LATERALITY, UNSPECIFIED WHETHER SCIATICA PRESENT: ICD-10-CM

## 2024-10-15 DIAGNOSIS — E66.812 CLASS 2 OBESITY DUE TO EXCESS CALORIES WITH BODY MASS INDEX (BMI) OF 38.0 TO 38.9 IN ADULT, UNSPECIFIED WHETHER SERIOUS COMORBIDITY PRESENT: ICD-10-CM

## 2024-10-15 DIAGNOSIS — M43.26 FUSION OF SPINE, LUMBAR REGION: ICD-10-CM

## 2024-10-15 DIAGNOSIS — M51.369 DEGENERATION OF INTERVERTEBRAL DISC OF LUMBAR REGION, UNSPECIFIED WHETHER PAIN PRESENT: ICD-10-CM

## 2024-10-15 DIAGNOSIS — M54.50 CHRONIC LOW BACK PAIN, UNSPECIFIED BACK PAIN LATERALITY, UNSPECIFIED WHETHER SCIATICA PRESENT: ICD-10-CM

## 2024-10-15 DIAGNOSIS — M54.9 DORSALGIA, UNSPECIFIED: ICD-10-CM

## 2024-10-15 DIAGNOSIS — Z98.2 S/P VP SHUNT: Primary | ICD-10-CM

## 2024-10-15 DIAGNOSIS — Z98.1 HX OF SPINAL FUSION: ICD-10-CM

## 2024-10-15 DIAGNOSIS — R00.1 BRADYCARDIA: ICD-10-CM

## 2024-10-15 PROCEDURE — 99999 PR PBB SHADOW E&M-EST. PATIENT-LVL IV: CPT | Mod: PBBFAC,COE,, | Performed by: NURSE PRACTITIONER

## 2024-10-15 PROCEDURE — 74018 RADEX ABDOMEN 1 VIEW: CPT | Mod: 26,COE,, | Performed by: RADIOLOGY

## 2024-10-15 PROCEDURE — 74018 RADEX ABDOMEN 1 VIEW: CPT | Mod: TC

## 2024-10-15 PROCEDURE — 72148 MRI LUMBAR SPINE W/O DYE: CPT | Mod: 26,COE,, | Performed by: RADIOLOGY

## 2024-10-15 PROCEDURE — 70250 X-RAY EXAM OF SKULL: CPT | Mod: 26,COE,, | Performed by: RADIOLOGY

## 2024-10-15 PROCEDURE — 71045 X-RAY EXAM CHEST 1 VIEW: CPT | Mod: TC

## 2024-10-15 PROCEDURE — 99214 OFFICE O/P EST MOD 30 MIN: CPT | Mod: S$GLB,COE,, | Performed by: NURSE PRACTITIONER

## 2024-10-15 PROCEDURE — 72148 MRI LUMBAR SPINE W/O DYE: CPT | Mod: TC

## 2024-10-15 PROCEDURE — 72020 X-RAY EXAM OF SPINE 1 VIEW: CPT | Mod: 26,COE,, | Performed by: RADIOLOGY

## 2024-10-15 PROCEDURE — 70260 X-RAY EXAM OF SKULL: CPT | Mod: TC

## 2024-10-15 PROCEDURE — 99213 OFFICE O/P EST LOW 20 MIN: CPT | Mod: S$GLB,COE,, | Performed by: NURSE PRACTITIONER

## 2024-10-15 PROCEDURE — 71045 X-RAY EXAM CHEST 1 VIEW: CPT | Mod: 26,COE,, | Performed by: RADIOLOGY

## 2024-10-15 RX ORDER — DULOXETIN HYDROCHLORIDE 60 MG/1
60 CAPSULE, DELAYED RELEASE ORAL
COMMUNITY
Start: 2024-08-15

## 2024-10-15 RX ORDER — TIZANIDINE 4 MG/1
4 TABLET ORAL EVERY 6 HOURS PRN
Status: ON HOLD | COMMUNITY
Start: 2024-10-01 | End: 2024-10-18

## 2024-10-15 RX ORDER — OXYCODONE AND ACETAMINOPHEN 10; 325 MG/1; MG/1
1 TABLET ORAL EVERY 6 HOURS PRN
Status: ON HOLD | COMMUNITY
Start: 2024-04-19 | End: 2024-10-18 | Stop reason: HOSPADM

## 2024-10-15 RX ORDER — TRAMADOL HYDROCHLORIDE 50 MG/1
50 TABLET ORAL EVERY 6 HOURS PRN
Status: ON HOLD | COMMUNITY
Start: 2024-06-14 | End: 2024-10-18 | Stop reason: HOSPADM

## 2024-10-15 RX ORDER — TOPIRAMATE 25 MG/1
25 TABLET ORAL
COMMUNITY
Start: 2024-05-22

## 2024-10-15 RX ORDER — DIAZEPAM 2 MG/1
TABLET ORAL
Status: ON HOLD | COMMUNITY
Start: 2024-03-25 | End: 2024-10-18 | Stop reason: HOSPADM

## 2024-10-15 NOTE — PROGRESS NOTES
"[facility]  Progress Note    Patient Name: Ina Blair  MRN: [id number]  Date of Evaluation- 10/15/2024  PCP- No, Primary Doctor    Future cases for Ina Blair [[id number]]       Case ID Status Date Time Eric Procedure Provider Location    [id number] [facility] 10/16/2024 11:00  FUSION, SPINE Quentin Meek MD [[id number]] [facility]            HPI:  This is a 53 y.o. female  who presents today for a preoperative evaluation in preparation for spine fusion  Surgery is indicated for  DDD Lumbar    .   Patient is new to me.    The history has been obtained by speaking with the patient and reviewing the electronic medical record and/or outside health information. Significant health conditions for the perioperative period are discussed below in assessment and plan.     Patient reports current health status to be Good.  Denies any new symptoms before surgery.       Subjective/ Objective:     Chief Complaint: Preoperative evaulation, perioperative medical management, and complication reduction plan.     Functional Capacity:  Able to climb a flight of stairs without CP SOB or Syncope.  Able to meet 4 METs      Anesthesia issues: None      Difficulty mouth opening:  none  Steroid use in the last 12 months: none     Dental Issues: none    Family anesthesia difficulty: Mom has hallucinations and verbally "hateful"     Family Hx of Thrombosis Mother had blood clots unknown reason    Past Medical History:   Diagnosis Date    Enlarged thyroid 2/20/2024    Headache     Hydrocephalus     Hypertension     Neuropathy     Unspecified osteoarthritis, unspecified site      Past Surgical History:   Procedure Laterality Date    APPENDECTOMY      bladder stretch      CARPAL TUNNEL RELEASE Bilateral     DECOMPRESSION OF CERVICAL SPINE BY POSTERIOR APPROACH N/A 3/20/2024    Procedure: DECOMPRESSION, SPINE, CERVICAL, POSTERIOR APPROACH;  Surgeon: Quentin Meek MD;  [facility];  Service: " "Neurosurgery;  Laterality: N/A;  [facility] patient  C3-6 PCF  Anesthesia: General  NM: EMg, SEP, MNEP  Rad: C-arm  Brace: [place] J  Bed: REg slider w gel rolls  Bed position: head to core  headrest: [place]  Depuy    LAMINECTOMY AND MICRODISCECTOMY LUMBAR SPINE  2021    LUMBAR FUSION  2008    TOTAL HIP ARTHROPLASTY Left 10/31/2023    TOTAL KNEE ARTHROPLASTY Bilateral     VENTRICULOPERITONEAL SHUNT         Review of Systems   Constitutional:  Negative for chills, fatigue and fever.   HENT:  Negative for trouble swallowing and voice change.    Eyes:  Negative for photophobia and visual disturbance.        No acute visual changes   Respiratory:  Negative for apnea, cough, chest tightness, shortness of breath and wheezing.         STOP bang  Score 4    High risk MERA   Cardiovascular:  Negative for chest pain, palpitations and leg swelling.   Gastrointestinal:  Positive for constipation. Negative for abdominal distention, abdominal pain, blood in stool, diarrhea, nausea and vomiting.        NO FLD, hepatitis, cirrhosis  No BRB or black tarry stool     Genitourinary:  Negative for difficulty urinating, dysuria, flank pain, frequency, hematuria and urgency.   Musculoskeletal:  Positive for arthralgias and back pain. Negative for gait problem, joint swelling, myalgias, neck pain and neck stiffness.   Neurological:  Positive for headaches. Negative for dizziness, tremors, seizures, syncope, weakness, light-headedness and numbness.   Psychiatric/Behavioral:  Negative for suicidal ideas.         VITALS  Visit Vitals  BP (!) 98/57   Pulse 60   Temp 98 °F (36.7 °C)   Ht 5' 4" (1.626 m)   Wt 100.5 kg (221 lb 10.8 oz)   SpO2 97%   BMI 38.05 kg/m²          Physical Exam  Constitutional:       General: She is not in acute distress.     Appearance: Normal appearance. She is well-developed. She is not diaphoretic.   HENT:      Head: Normocephalic.      Right Ear: Hearing normal.      Left Ear: Hearing normal.      Nose: Nose normal.      " Mouth/Throat:      Lips: Pink.      Mouth: Mucous membranes are moist.      Pharynx: No oropharyngeal exudate.   Eyes:      General: Lids are normal.         Right eye: No discharge.         Left eye: No discharge.      Conjunctiva/sclera: Conjunctivae normal.      Pupils: Pupils are equal, round, and reactive to light.   Neck:      Thyroid: No thyromegaly.      Vascular: No carotid bruit or JVD.      Trachea: Trachea and phonation normal. No tracheal deviation.   Cardiovascular:      Rate and Rhythm: Normal rate and regular rhythm.      Pulses: Normal pulses.           Carotid pulses are 2+ on the right side and 2+ on the left side.       Radial pulses are 2+ on the right side and 2+ on the left side.        Posterior tibial pulses are 2+ on the right side and 2+ on the left side.      Heart sounds: Normal heart sounds. No murmur heard.     No friction rub. No gallop.   Pulmonary:      Effort: Pulmonary effort is normal. No respiratory distress.      Breath sounds: Normal breath sounds. No stridor. No wheezing or rales.      Comments: Clear Anterior and Posterior BBS  Abdominal:      General: Abdomen is protuberant. Bowel sounds are normal. There is no distension.      Palpations: Abdomen is soft.      Tenderness: There is no abdominal tenderness. There is no guarding.   Musculoskeletal:         General: No tenderness or deformity. Normal range of motion.      Cervical back: Normal range of motion and neck supple. No rigidity.      Right lower leg: No edema.      Left lower leg: No edema.   Lymphadenopathy:      Head:      Right side of head: No submental, submandibular, tonsillar, preauricular, posterior auricular or occipital adenopathy.      Left side of head: No submental, submandibular, tonsillar, preauricular, posterior auricular or occipital adenopathy.      Cervical: No cervical adenopathy.      Right cervical: No superficial cervical adenopathy.     Left cervical: No superficial cervical adenopathy.    Skin:     General: Skin is warm and dry.      Capillary Refill: Capillary refill takes 2 to 3 seconds.      Coloration: Skin is not pale.      Findings: No erythema or rash.   Neurological:      Mental Status: She is alert and oriented to person, place, and time. Mental status is at baseline.      GCS: GCS eye subscore is 4. GCS verbal subscore is 5. GCS motor subscore is 6.      Motor: No abnormal muscle tone.      Coordination: Coordination normal.   Psychiatric:         Attention and Perception: Attention and perception normal.         Mood and Affect: Mood and affect normal.         Speech: Speech normal.         Behavior: Behavior normal. Behavior is cooperative.         Thought Content: Thought content normal.         Cognition and Memory: Cognition and memory normal.         Judgment: Judgment normal.          Significant Labs:  Lab Results   Component Value Date    WBC 11.93 03/22/2024    HGB 12.5 03/22/2024    HCT 37.6 03/22/2024     03/22/2024    ALT 17 02/20/2024    AST 16 02/20/2024     03/22/2024    K 3.8 03/22/2024     03/22/2024    CREATININE 0.6 03/22/2024    BUN 7 03/22/2024    CO2 28 03/22/2024    TSH 0.966 02/20/2024    INR 0.9 02/20/2024    HGBA1C 5.7 (H) 02/20/2024       Diagnostic Studies: No relevant studies.    EKG:   Results for orders placed or performed during the hospital encounter of 02/20/24   EKG 12-lead    Collection Time: 02/20/24  9:46 AM   Result Value Ref Range    QRS Duration 100 ms    OHS QTC Calculation 441 ms    Narrative    Test Reason : I10,M43.26,M16.12,    Vent. Rate : 045 BPM     Atrial Rate : 045 BPM     P-R Int : 174 ms          QRS Dur : 100 ms      QT Int : 510 ms       P-R-T Axes : 052 026 042 degrees     QTc Int : 441 ms    Marked sinus bradycardia  Abnormal ECG  No previous ECGs available  Confirmed by ROSS SHEARER MD ([id number]) on 2/20/2024 12:29:08 PM    Referred By: CARLOS GOINS           Confirmed By:ROSS SHEARER MD       2D ECHO:  TTE:  No  results found for this or any previous visit.    ISA:  No results found for this or any previous visit.     Imaging   Active Cardiac Conditions: None      Revised Cardiac Risk Index   High -Risk Surgery  Intraperitoneal; Intrathoracic; suprainguinal vascular Yes- + 1 No- 0   History of Ischemic Heart Disease   (Hx of MI/positive exercise test/current chest pain due to ischemia/use of nitrate therapy/EKG with pathological Q waves) Yes- + 1 No- 0   History of CHF  (Pulmonary edema/bilateral rales or S3 gallop/PND/CXR showing pulmonary vascular redistribution) Yes- + 1 No- 0   History of CVA   (Prior stroke or TIA) Yes- + 1 No- 0   Pre-operative treatment with insulin Yes- + 1 No- 0   Pre-operative creatinine > 2mg/dl Yes- + 1 No- 0   Total:      Risk Status:  Estimated risk of cardiac complications after non-cardiac surgery using the Revised Cardiac Risk Index for Preoperative risk is 3.9 %      ARISCAT (Canet) risk index: Low: 1.6% risk of post-op pulmonary complications.    American Society of Anesthesiologists Physical Status classification (ASA): 3         No further cardiac workup needed prior to surgery.        Preoperative cardiac risk assessment-  The patient does not have any active cardiac conditions . Revised cardiac risk index predictors- ---.Functional capacity is more than 4 Mets. She will be undergoing a Spine procedure that carries a Moderate Risk risk     The estimated risk of the rate of adverse cardiac outcomes  3.9    No further cardiac work up is indicated prior to proceeding with the surgery     Orders Placed This Encounter    Potassium       American Society of Anesthesiologists Physical status classification ( ASA ) class: 3     Postoperative pulmonary complication risk assessment: 1.6     ARISCAT ( Canet) risk index- risk class -  Low, if duration of surgery is under 3 hours, intermediate, if duration of surgery is over 3 hours        Assessment/Plan:     Primary hypertension  Current BP   98/57 today.    Taking: Norvasc HCTZ Losartan  No BP readings   Encouraged patient to begin home BP readings as she has been having lower BPs in recent weeks      Lifestyle changes to reduce systolic BP:  Smoking cessation; exercise 30 minutes per day,  5 days per week or 150 minutes weekly; sodium reduction and avoidance of high salt foods such as processed meats, frozen meals and  fast foods.   Keeping a healthy weight/BMI can help with better BP control    BP acceptable for surgery. I recommend monitoring BP during perioperative period as uncontrolled pain can elevate blood pressure.         Class 2 obesity due to excess calories with body mass index (BMI) of 38.0 to 38.9 in adult  BMI 38.05  Has lost some weight    Was 235--> 214lbs  Watching her diet and cutting sweets    Bradycardia  HR 60 this visit  Holter monitor was performed and stated average heart rate 59    Fusion of spine, lumbar region  Surgery scheduled 10/16/24    Hypokalemia  K-3.4  Repeat today 3.7    Enlarged thyroid  Has h/o Thyroid nodule  F/U at home        Preventive perioperative care    Thromboembolic prophylaxis:  Her risk factors for thrombosis include morbid obesity, surgical procedure, age, and reduced mobility.I suggest  thromboembolic prophylaxis ( mechanical/pharmacological, weighing the risk benefits of pharmacological agent use considering boyd procedural bleeding )  during the perioperative period.I suggested being active in the post operative period.      Postoperative pulmonary complication prophylaxis-Risk factors for post operative pulmonary complications include ASA class >2- I suggest incentive spirometry use, early ambulation, and pain control so as to avoid diaphragmatic splinting  Brush teeth twice per day, oral rinses, sleep with the head of the bed up 30 degrees     Renal complication prophylaxis-Risk factors for renal complications include age . I suggest keeping her well hydrated and avoidance/ minimizing the use of   NSAID's,TARIQ 2 Inhibitors ,IV contrast if possible in the perioperative period.I suggested drinking 2 litre's of water a day      Surgical site Infection Prophylaxis-I  suggest appropriate antibiotic for Prophylaxis against Surgical site infections Shower with Hibiclens in the night before surgery and the morning of surgery     In view of Spine procedure the patient  is at risk of postoperative urinary retention.  I suggest avoidance / minimizing the of  Benzodiazepines,Anticholinergic medication,antihistamines ( Benadryl) , if possible in the perioperative period. I suggest using the minimum possible use of opioids for the minimum period of time in the perioperative period. Benadryl avoidance suggested      This visit was focused on Preoperative evaluation, Perioperative Medical management, complication reduction plans. I suggest that the patient follows up with primary care or relevant sub specialists for ongoing health care.    I appreciate the opportunity to be involved in this patients care. Please feel free to contact me if there were any questions about this consultation.    Patient is optimized    Repeat K- 3.7  I spent a total of 36 minutes on the day of the visit.This includes face to face time and non-face to face time preparing to see the patient (eg, review of tests), obtaining and/or reviewing separately obtained history, documenting clinical information in the electronic or other health record, independently interpreting results and communicating results to the patient/family/caregiver, or care coordinator.      Brian Brennan NP  Perioperative Medicine  [facility]   Pager [phone]

## 2024-10-15 NOTE — H&P (VIEW-ONLY)
Neurosurgery  Established Patient    SUBJECTIVE:     History of Present Illness: Ina Blair is a 53 y.o.  female with history of hydrocephalus status post shunt, Codman Hakim set at 100, prior L4-5 TLIF, and most recently PCF C3-6 with Dr. Meek on 3/20/24. She is being seen in clinic today for her pre-operative appointment for her revision and extension PSF L3-5 with Dr. Meek. States that she continues to struggle with low back and R>L leg pain that radiates down the lateral and posterior aspect of the legs. Back = leg pain. She feels as though her right foot continues to drag. Rates her pain as a 3/10.     Review of patient's allergies indicates:   Allergen Reactions    Adhesive     Adhesive tape-silicones        Current Outpatient Medications   Medication Sig Dispense Refill    acetaminophen (TYLENOL) 500 MG tablet Take 1,000 mg by mouth every 8 (eight) hours as needed for Pain.      ALPRAZolam (XANAX) 0.5 MG tablet Take 0.5 mg by mouth 2 (two) times daily as needed.      amLODIPine (NORVASC) 2.5 MG tablet TAKE ONE TABLET BY MOUTH DAILY - MAY TAKE 2 TABLETS IF BLOOD PRESSURE IS OVER 160      diazePAM (VALIUM) 2 MG tablet       DULoxetine (CYMBALTA) 60 MG capsule Take 60 mg by mouth.      EScitalopram oxalate (LEXAPRO) 20 MG tablet Take 20 mg by mouth once daily.      gabapentin (NEURONTIN) 400 MG capsule Take 400 mg by mouth 3 (three) times daily.      lisdexamfetamine (VYVANSE) 20 MG capsule Take 20 mg by mouth every morning.      losartan-hydrochlorothiazide 50-12.5 mg (HYZAAR) 50-12.5 mg per tablet Take 1 tablet by mouth once daily.      oxyCODONE-acetaminophen (PERCOCET)  mg per tablet Take 1 tablet by mouth every 6 (six) hours as needed.      tiZANidine (ZANAFLEX) 4 MG tablet Take 4 mg by mouth every 6 (six) hours as needed.      topiramate (TOPAMAX) 25 MG tablet Take 25 mg by mouth.      traMADoL (ULTRAM) 50 mg tablet Take 50 mg by mouth every 6 (six) hours as needed.       tirzepatide (MOUNJARO) 2.5 mg/0.5 mL PnIj Inject 2.5 mg into the skin once a week.       No current facility-administered medications for this visit.       Past Medical History:   Diagnosis Date    Enlarged thyroid 2/20/2024    Headache     Hydrocephalus     Hypertension     Neuropathy     Unspecified osteoarthritis, unspecified site      Past Surgical History:   Procedure Laterality Date    APPENDECTOMY      bladder stretch      CARPAL TUNNEL RELEASE Bilateral     DECOMPRESSION OF CERVICAL SPINE BY POSTERIOR APPROACH N/A 3/20/2024    Procedure: DECOMPRESSION, SPINE, CERVICAL, POSTERIOR APPROACH;  Surgeon: Quentin Meek MD;  Location: [facility] OR [address] [place] [address];  Service: Neurosurgery;  Laterality: N/A;  [facility] patient  C3-6 PCF  Anesthesia: General  NM: EMg, SEP, MNEP  Rad: C-arm  Brace: [place] J  Bed: REg slider w gel rolls  Bed position: head to core  headrest: [place]  Depuy    LAMINECTOMY AND MICRODISCECTOMY LUMBAR SPINE  2021    LUMBAR FUSION  2008    TOTAL HIP ARTHROPLASTY Left 10/31/2023    TOTAL KNEE ARTHROPLASTY Bilateral     VENTRICULOPERITONEAL SHUNT       Family History       Problem Relation (Age of Onset)    Cancer Mother    Heart disease Mother    Hypertension Mother          Social History     Socioeconomic History    Marital status:      Spouse name: Tony    Number of children: 2   Tobacco Use    Smoking status: Former     Types: Cigarettes    Tobacco comments:     Smoked cigarettes 33 years 1/2 pack per day     Social Drivers of Health     Financial Resource Strain: Low Risk  (3/21/2024)    Overall Financial Resource Strain (CARDIA)     Difficulty of Paying Living Expenses: Not very hard   Food Insecurity: No Food Insecurity (3/21/2024)    Hunger Vital Sign     Worried About Running Out of Food in the Last Year: Never true     Ran Out of Food in the Last Year: Never true   Transportation Needs: No Transportation Needs (3/21/2024)    PRAPARE - Transportation     Lack of Transportation (Medical): No      Lack of Transportation (Non-Medical): No   Physical Activity: Inactive (3/21/2024)    Exercise Vital Sign     Days of Exercise per Week: 0 days     Minutes of Exercise per Session: 0 min   Stress: Stress Concern Present (3/21/2024)    [employer] [place] [employer] - Occupational Stress Questionnaire     Feeling of Stress : To some extent   Housing Stability: Low Risk  (3/21/2024)    Housing Stability Vital Sign     Unable to Pay for Housing in the Last Year: No     Number of Places Lived in the Last Year: 1     Unstable Housing in the Last Year: No       Review of Systems    OBJECTIVE:     Vital Signs  Temp: 97.2 °F (36.2 °C)  Pulse: (!) 54  BP: 109/68  Pain Score:   3  Weight: 97.5 kg (214 lb 15.2 oz)  Body mass index is 36.88 kg/m².    Neurosurgery Physical Exam  General: well developed, well nourished, no distress.   Head: normocephalic, atraumatic  Neurologic: Alert and oriented. Thought content appropriate.  GCS: Motor: 6/Verbal: 5/Eyes: 4 GCS Total: 15  Mental Status: Awake, Alert, Oriented x 4  Language: No aphasia  Speech: No dysarthria  Cranial nerves: face symmetric, tongue midline, CN II-XII grossly intact.   Eyes: pupils equal, round, reactive to light with accomodation, EOMI.   Pulmonary: normal respirations, no signs of respiratory distress  Abdomen: soft, non-distended  Skin: Skin is warm, dry and intact  Motor Strength:Moves all extremities spontaneously with good tone.    Strength  Deltoids Triceps Biceps Wrist Extension Wrist Flexion Hand    Upper: R 5/5 5/5 5/5 5/5 5/5 5/5    L 5/5 5/5 5/5 5/5 5/5 5/5     HF KE KF DF PF EHL   Lower: R 5/5 5/5 5/5 4+/5 4+/5 4+/5    L 5/5 5/5 5/5 5/5 5/5 5/5        Cerebellar:   Gait stable     Cervical:   ROM: Full with flexion, extension, lateral rotation and ear-to-shoulder bend.   Midline TTP: Negative.     Thoracic:  Midline TTP: Negative.     Lumbar:  Midline TTP: Positive  Straight Leg Test: Positive bilaterally    Diagnostic Results:  There is  no new imaging to review for this encounter.      ASSESSMENT/PLAN:     Ina Blair is a 53 y.o. female with history of hydrocephalus status post shunt, Codman Hakim set at 100, prior L4-5 TLIF, and most recently PCF C3-6 with Dr. Meek on 3/20/24. She was seen in clinic today for her pre-operative appointment for her revision and extension PSF L3-5 with Dr. Meek for worsening lumbar radiculopathy due adjacent level disease at L3-4 and severe stenosis. R/B/A/I/M were reviewed in detail and she wishes to proceed. Dr. Meek has ordered an updated MRI lumbar spine for surgical planning.     The patient also expressed concerns with pain control following surgery. States that Zanaflex works better than Robaxin, and she is on chronic Percocet 10/325 and will need additional pain control for break through pain. She was reassured.     I have encouraged her to contact the clinic with any questions, concerns, or adverse clinical changes. She verbalized understanding.      RUDDY Larson-BHARATHI  Neurosurgery  [facility].      Note dictated with voice recognition software, please excuse any grammatical errors.

## 2024-10-15 NOTE — ASSESSMENT & PLAN NOTE
Current BP  98/57 today.    Taking: Norvasc HCTZ Losartan  No BP readings   Encouraged patient to begin home BP readings as she has been having lower BPs in recent weeks      Lifestyle changes to reduce systolic BP:  Smoking cessation; exercise 30 minutes per day,  5 days per week or 150 minutes weekly; sodium reduction and avoidance of high salt foods such as processed meats, frozen meals and  fast foods.   Keeping a healthy weight/BMI can help with better BP control    BP acceptable for surgery. I recommend monitoring BP during perioperative period as uncontrolled pain can elevate blood pressure.

## 2024-10-15 NOTE — OUTPATIENT SUBJECTIVE & OBJECTIVE
"Outpatient Subjective & Objective      Chief Complaint: Preoperative evaulation, perioperative medical management, and complication reduction plan.     Functional Capacity:  Able to climb a flight of stairs without CP SOB or Syncope.  Able to meet 4 METs      Anesthesia issues: None      Difficulty mouth opening:  none  Steroid use in the last 12 months: none     Dental Issues: none    Family anesthesia difficulty: Mom has hallucinations and verbally "hateful"     Family Hx of Thrombosis Mother had blood clots unknown reason    Past Medical History:   Diagnosis Date    Enlarged thyroid 2/20/2024    Headache     Hydrocephalus     Hypertension     Neuropathy     Unspecified osteoarthritis, unspecified site      Past Surgical History:   Procedure Laterality Date    APPENDECTOMY      bladder stretch      CARPAL TUNNEL RELEASE Bilateral     DECOMPRESSION OF CERVICAL SPINE BY POSTERIOR APPROACH N/A 3/20/2024    Procedure: DECOMPRESSION, SPINE, CERVICAL, POSTERIOR APPROACH;  Surgeon: Quentin Meek MD;  Location: [facility] OR [address] [place] [address];  Service: Neurosurgery;  Laterality: N/A;  [facility] patient  C3-6 PCF  Anesthesia: General  NM: EMg, SEP, MNEP  Rad: C-arm  Brace: [place]   Bed: [facility] slider w gel rolls  Bed position: head to core  headrest: [place]  Depuy    LAMINECTOMY AND MICRODISCECTOMY LUMBAR SPINE  2021    LUMBAR FUSION  2008    TOTAL HIP ARTHROPLASTY Left 10/31/2023    TOTAL KNEE ARTHROPLASTY Bilateral     VENTRICULOPERITONEAL SHUNT         Review of Systems   Constitutional:  Negative for chills, fatigue and fever.   HENT:  Negative for trouble swallowing and voice change.    Eyes:  Negative for photophobia and visual disturbance.        No acute visual changes   Respiratory:  Negative for apnea, cough, chest tightness, shortness of breath and wheezing.         STOP bang  Score 4    High risk MERA   Cardiovascular:  Negative for chest pain, palpitations and leg swelling.   Gastrointestinal:  Positive for constipation. Negative " "for abdominal distention, abdominal pain, blood in stool, diarrhea, nausea and vomiting.        NO FLD, hepatitis, cirrhosis  No BRB or black tarry stool     Genitourinary:  Negative for difficulty urinating, dysuria, flank pain, frequency, hematuria and urgency.   Musculoskeletal:  Positive for arthralgias and back pain. Negative for gait problem, joint swelling, myalgias, neck pain and neck stiffness.   Neurological:  Positive for headaches. Negative for dizziness, tremors, seizures, syncope, weakness, light-headedness and numbness.   Psychiatric/Behavioral:  Negative for suicidal ideas.         VITALS  Visit Vitals  BP (!) 98/57   Pulse 60   Temp 98 °F (36.7 °C)   Ht 5' 4" (1.626 m)   Wt 100.5 kg (221 lb 10.8 oz)   SpO2 97%   BMI 38.05 kg/m²          Physical Exam  Constitutional:       General: She is not in acute distress.     Appearance: Normal appearance. She is well-developed. She is not diaphoretic.   HENT:      Head: Normocephalic.      Right Ear: Hearing normal.      Left Ear: Hearing normal.      Nose: Nose normal.      Mouth/Throat:      Lips: Pink.      Mouth: Mucous membranes are moist.      Pharynx: No oropharyngeal exudate.   Eyes:      General: Lids are normal.         Right eye: No discharge.         Left eye: No discharge.      Conjunctiva/sclera: Conjunctivae normal.      Pupils: Pupils are equal, round, and reactive to light.   Neck:      Thyroid: No thyromegaly.      Vascular: No carotid bruit or JVD.      Trachea: Trachea and phonation normal. No tracheal deviation.   Cardiovascular:      Rate and Rhythm: Normal rate and regular rhythm.      Pulses: Normal pulses.           Carotid pulses are 2+ on the right side and 2+ on the left side.       Radial pulses are 2+ on the right side and 2+ on the left side.        Posterior tibial pulses are 2+ on the right side and 2+ on the left side.      Heart sounds: Normal heart sounds. No murmur heard.     No friction rub. No gallop.   Pulmonary:      " Effort: Pulmonary effort is normal. No respiratory distress.      Breath sounds: Normal breath sounds. No stridor. No wheezing or rales.      Comments: Clear Anterior and Posterior BBS  Abdominal:      General: Abdomen is protuberant. Bowel sounds are normal. There is no distension.      Palpations: Abdomen is soft.      Tenderness: There is no abdominal tenderness. There is no guarding.   Musculoskeletal:         General: No tenderness or deformity. Normal range of motion.      Cervical back: Normal range of motion and neck supple. No rigidity.      Right lower leg: No edema.      Left lower leg: No edema.   Lymphadenopathy:      Head:      Right side of head: No submental, submandibular, tonsillar, preauricular, posterior auricular or occipital adenopathy.      Left side of head: No submental, submandibular, tonsillar, preauricular, posterior auricular or occipital adenopathy.      Cervical: No cervical adenopathy.      Right cervical: No superficial cervical adenopathy.     Left cervical: No superficial cervical adenopathy.   Skin:     General: Skin is warm and dry.      Capillary Refill: Capillary refill takes 2 to 3 seconds.      Coloration: Skin is not pale.      Findings: No erythema or rash.   Neurological:      Mental Status: She is alert and oriented to person, place, and time. Mental status is at baseline.      GCS: GCS eye subscore is 4. GCS verbal subscore is 5. GCS motor subscore is 6.      Motor: No abnormal muscle tone.      Coordination: Coordination normal.   Psychiatric:         Attention and Perception: Attention and perception normal.         Mood and Affect: Mood and affect normal.         Speech: Speech normal.         Behavior: Behavior normal. Behavior is cooperative.         Thought Content: Thought content normal.         Cognition and Memory: Cognition and memory normal.         Judgment: Judgment normal.          Significant Labs:  Lab Results   Component Value Date    WBC 11.93  03/22/2024    HGB 12.5 03/22/2024    HCT 37.6 03/22/2024     03/22/2024    ALT 17 02/20/2024    AST 16 02/20/2024     03/22/2024    K 3.8 03/22/2024     03/22/2024    CREATININE 0.6 03/22/2024    BUN 7 03/22/2024    CO2 28 03/22/2024    TSH 0.966 02/20/2024    INR 0.9 02/20/2024    HGBA1C 5.7 (H) 02/20/2024       Diagnostic Studies: No relevant studies.    EKG:   Results for orders placed or performed during the hospital encounter of 02/20/24   EKG 12-lead    Collection Time: 02/20/24  9:46 AM   Result Value Ref Range    QRS Duration 100 ms    OHS QTC Calculation 441 ms    Narrative    Test Reason : I10,M43.26,M16.12,    Vent. Rate : 045 BPM     Atrial Rate : 045 BPM     P-R Int : 174 ms          QRS Dur : 100 ms      QT Int : 510 ms       P-R-T Axes : 052 026 042 degrees     QTc Int : 441 ms    Marked sinus bradycardia  Abnormal ECG  No previous ECGs available  Confirmed by ROSS SHEARER MD ([id number]) on 2/20/2024 12:29:08 PM    Referred By: CARLOS GOINS           Confirmed By:ROSS SHEARER MD       2D ECHO:  TTE:  No results found for this or any previous visit.    ISA:  No results found for this or any previous visit.     Imaging   Active Cardiac Conditions: None      Revised Cardiac Risk Index   High -Risk Surgery  Intraperitoneal; Intrathoracic; suprainguinal vascular Yes- + 1 No- 0   History of Ischemic Heart Disease   (Hx of MI/positive exercise test/current chest pain due to ischemia/use of nitrate therapy/EKG with pathological Q waves) Yes- + 1 No- 0   History of CHF  (Pulmonary edema/bilateral rales or S3 gallop/PND/CXR showing pulmonary vascular redistribution) Yes- + 1 No- 0   History of CVA   (Prior stroke or TIA) Yes- + 1 No- 0   Pre-operative treatment with insulin Yes- + 1 No- 0   Pre-operative creatinine > 2mg/dl Yes- + 1 No- 0   Total:      Risk Status:  Estimated risk of cardiac complications after non-cardiac surgery using the Revised Cardiac Risk Index for Preoperative risk is  3.9 %      ARISCAT ([place]) risk index: Low: 1.6% risk of post-op pulmonary complications.    American Society of Anesthesiologists Physical Status classification (ASA): 3         No further cardiac workup needed prior to surgery.    Outpatient Subjective & Objective

## 2024-10-15 NOTE — HPI
This is a 53 y.o. female  who presents today for a preoperative evaluation in preparation for spine fusion  Surgery is indicated for  DDD Lumbar    .   Patient is new to me.    The history has been obtained by speaking with the patient and reviewing the electronic medical record and/or outside health information. Significant health conditions for the perioperative period are discussed below in assessment and plan.     Patient reports current health status to be Good.  Denies any new symptoms before surgery.

## 2024-10-15 NOTE — PROGRESS NOTES
Neurosurgery  Established Patient    SUBJECTIVE:     History of Present Illness: Ina Blair is a 53 y.o.  female with history of hydrocephalus status post shunt, Codman Hakim set at 100, prior L4-5 TLIF, and most recently PCF C3-6 with Dr. Meek on 3/20/24. She is being seen in clinic today for her pre-operative appointment for her revision and extension PSF L3-5 with Dr. Meek. States that she continues to struggle with low back and R>L leg pain that radiates down the lateral and posterior aspect of the legs. Back = leg pain. She feels as though her right foot continues to drag. Rates her pain as a 3/10.     Review of patient's allergies indicates:   Allergen Reactions    Adhesive     Adhesive tape-silicones        Current Outpatient Medications   Medication Sig Dispense Refill    acetaminophen (TYLENOL) 500 MG tablet Take 1,000 mg by mouth every 8 (eight) hours as needed for Pain.      ALPRAZolam (XANAX) 0.5 MG tablet Take 0.5 mg by mouth 2 (two) times daily as needed.      amLODIPine (NORVASC) 2.5 MG tablet TAKE ONE TABLET BY MOUTH DAILY - MAY TAKE 2 TABLETS IF BLOOD PRESSURE IS OVER 160      diazePAM (VALIUM) 2 MG tablet       DULoxetine (CYMBALTA) 60 MG capsule Take 60 mg by mouth.      EScitalopram oxalate (LEXAPRO) 20 MG tablet Take 20 mg by mouth once daily.      gabapentin (NEURONTIN) 400 MG capsule Take 400 mg by mouth 3 (three) times daily.      lisdexamfetamine (VYVANSE) 20 MG capsule Take 20 mg by mouth every morning.      losartan-hydrochlorothiazide 50-12.5 mg (HYZAAR) 50-12.5 mg per tablet Take 1 tablet by mouth once daily.      oxyCODONE-acetaminophen (PERCOCET)  mg per tablet Take 1 tablet by mouth every 6 (six) hours as needed.      tiZANidine (ZANAFLEX) 4 MG tablet Take 4 mg by mouth every 6 (six) hours as needed.      topiramate (TOPAMAX) 25 MG tablet Take 25 mg by mouth.      traMADoL (ULTRAM) 50 mg tablet Take 50 mg by mouth every 6 (six) hours as needed.       tirzepatide (MOUNJARO) 2.5 mg/0.5 mL PnIj Inject 2.5 mg into the skin once a week.       No current facility-administered medications for this visit.       Past Medical History:   Diagnosis Date    Enlarged thyroid 2/20/2024    Headache     Hydrocephalus     Hypertension     Neuropathy     Unspecified osteoarthritis, unspecified site      Past Surgical History:   Procedure Laterality Date    APPENDECTOMY      bladder stretch      CARPAL TUNNEL RELEASE Bilateral     DECOMPRESSION OF CERVICAL SPINE BY POSTERIOR APPROACH N/A 3/20/2024    Procedure: DECOMPRESSION, SPINE, CERVICAL, POSTERIOR APPROACH;  Surgeon: Quentin Meek MD;  Location: [facility] OR [address] [place] [address];  Service: Neurosurgery;  Laterality: N/A;  [facility] [place] patient  C3-6 PCF  Anesthesia: General  NM: EMg, SEP, MNEP  Rad: C-arm  Brace: [place] J  Bed: REg slider w gel rolls  Bed position: head to core  headrest: [place]  Depuy    LAMINECTOMY AND MICRODISCECTOMY LUMBAR SPINE  2021    LUMBAR FUSION  2008    TOTAL HIP ARTHROPLASTY Left 10/31/2023    TOTAL KNEE ARTHROPLASTY Bilateral     VENTRICULOPERITONEAL SHUNT       Family History       Problem Relation (Age of Onset)    Cancer Mother    Heart disease Mother    Hypertension Mother          Social History     Socioeconomic History    Marital status:      Spouse name: Tony    Number of children: 2   Tobacco Use    Smoking status: Former     Types: Cigarettes    Tobacco comments:     Smoked cigarettes 33 years 1/2 pack per day     Social Drivers of Health     Financial Resource Strain: Low Risk  (3/21/2024)    Overall Financial Resource Strain (CARDIA)     Difficulty of Paying Living Expenses: Not very hard   Food Insecurity: No Food Insecurity (3/21/2024)    Hunger Vital Sign     Worried About Running Out of Food in the Last Year: Never true     Ran Out of Food in the Last Year: Never true   Transportation Needs: No Transportation Needs (3/21/2024)    PRAPARE - Transportation     Lack of Transportation (Medical): No      Lack of Transportation (Non-Medical): No   Physical Activity: Inactive (3/21/2024)    Exercise Vital Sign     Days of Exercise per Week: 0 days     Minutes of Exercise per Session: 0 min   Stress: Stress Concern Present (3/21/2024)    [employer] [place] [employer] - Occupational Stress Questionnaire     Feeling of Stress : To some extent   Housing Stability: Low Risk  (3/21/2024)    Housing Stability Vital Sign     Unable to Pay for Housing in the Last Year: No     Number of Places Lived in the Last Year: 1     Unstable Housing in the Last Year: No       Review of Systems    OBJECTIVE:     Vital Signs  Temp: 97.2 °F (36.2 °C)  Pulse: (!) 54  BP: 109/68  Pain Score:   3  Weight: 97.5 kg (214 lb 15.2 oz)  Body mass index is 36.88 kg/m².    Neurosurgery Physical Exam  General: well developed, well nourished, no distress.   Head: normocephalic, atraumatic  Neurologic: Alert and oriented. Thought content appropriate.  GCS: Motor: 6/Verbal: 5/Eyes: 4 GCS Total: 15  Mental Status: Awake, Alert, Oriented x 4  Language: No aphasia  Speech: No dysarthria  Cranial nerves: face symmetric, tongue midline, CN II-XII grossly intact.   Eyes: pupils equal, round, reactive to light with accomodation, EOMI.   Pulmonary: normal respirations, no signs of respiratory distress  Abdomen: soft, non-distended  Skin: Skin is warm, dry and intact  Motor Strength:Moves all extremities spontaneously with good tone.    Strength  Deltoids Triceps Biceps Wrist Extension Wrist Flexion Hand    Upper: R 5/5 5/5 5/5 5/5 5/5 5/5    L 5/5 5/5 5/5 5/5 5/5 5/5     HF KE KF DF PF EHL   Lower: R 5/5 5/5 5/5 4+/5 4+/5 4+/5    L 5/5 5/5 5/5 5/5 5/5 5/5        Cerebellar:   Gait stable     Cervical:   ROM: Full with flexion, extension, lateral rotation and ear-to-shoulder bend.   Midline TTP: Negative.     Thoracic:  Midline TTP: Negative.     Lumbar:  Midline TTP: Positive  Straight Leg Test: Positive bilaterally    Diagnostic Results:  There is  no new imaging to review for this encounter.      ASSESSMENT/PLAN:     Ina Blair is a 53 y.o. female with history of hydrocephalus status post shunt, Codman Hakim set at 100, prior L4-5 TLIF, and most recently PCF C3-6 with Dr. Meek on 3/20/24. She was seen in clinic today for her pre-operative appointment for her revision and extension PSF L3-5 with Dr. Meek for worsening lumbar radiculopathy due adjacent level disease at L3-4 and severe stenosis. R/B/A/I/M were reviewed in detail and she wishes to proceed. Dr. Meek has ordered an updated MRI lumbar spine for surgical planning.     The patient also expressed concerns with pain control following surgery. States that Zanaflex works better than Robaxin, and she is on chronic Percocet 10/325 and will need additional pain control for break through pain. She was reassured.     I have encouraged her to contact the clinic with any questions, concerns, or adverse clinical changes. She verbalized understanding.      RUDDY Larson-BHARATHI  Neurosurgery  [facility].      Note dictated with voice recognition software, please excuse any grammatical errors.

## 2024-10-15 NOTE — DISCHARGE INSTRUCTIONS
Your surgery has been scheduled for:_______10/16/24___________________________________    You should report to:  ____[facility], located on the [place] side of the first floor of the           [facility] ([phone])  __X__The Second [facility] Center, located on the [facility] side of the            Second floor of the [facility] ([phone])  ____3rd Floor [facility] located on the [facility] side of the [facility] [phone]  ____[facility]: located at [address] [facility] [facility] [place] [address]. [address].     Please Note   Tell your doctor if you take Aspirin, products containing Aspirin, herbal medications  or blood thinners, such as Coumadin, Ticlid, or Plavix.  (Consult your provider regarding holding or stopping before surgery).  Arrange for someone to drive you home following surgery.  You will not be allowed to leave the surgical facility alone or drive yourself home following sedation and anesthesia.    Before Surgery  Stop taking all herbal medications, vitamins, and supplements 7 days prior to surgery  No Motrin/Advil (Ibuprofen) 7 days before surgery  No Aleve (Naproxen) 7 days before surgery   No Goody's/BC Powder 7 days before surgery  Refrain from drinking alcoholic beverages for 24 hours before and after surgery  Stop or limit smoking at least 24 hours prior to surgery  You may take Tylenol for pain    Night before Surgery  Do not eat or drink after midnight  Take a shower or bath (shower is recommended).  Bathe with Hibiclens soap or an antibacterial soap from the neck down.  If not supplied by your surgeon, hibiclens soap will need to be purchased over the counter in pharmacy.  Rinse soap off thoroughly.  Shampoo your hair with your regular shampoo    The Day of Surgery  Take another bath or shower with hibiclens or any antibacterial soap, to reduce the chance of infection.  Take heart  and blood pressure medications with a small sip of water, as advised by the perioperative team.  Do not take fluid pills  You may brush your teeth and rinse your mouth, but do not swallow any additional water.   Do not apply perfumes, powder, body lotions or deodorant on the day of surgery.  Nail polish should be removed.  Do not wear makeup or moisturizer  Wear comfortable clothes, such as a button front shirt and loose fitting pants.  Leave all jewelry, including body piercings, and valuables at home.    Bring any devices you will need after surgery such as crutches or canes.  If you have sleep apnea, please bring your CPAP machine  In the event that your physical condition changes including the onset of a cold or respiratory illness, or if you have to delay or cancel your surgery, please notify your surgeon.

## 2024-10-16 ENCOUNTER — ANESTHESIA (OUTPATIENT)
Dept: SURGERY | Facility: HOSPITAL | Age: 54
DRG: 427 | End: 2024-10-16
Payer: COMMERCIAL

## 2024-10-16 ENCOUNTER — HOSPITAL ENCOUNTER (INPATIENT)
Facility: HOSPITAL | Age: 54
LOS: 3 days | Discharge: HOME | DRG: 427 | End: 2024-10-19
Attending: NEUROLOGICAL SURGERY | Admitting: NEUROLOGICAL SURGERY
Payer: COMMERCIAL

## 2024-10-16 DIAGNOSIS — M43.16 SPONDYLOLISTHESIS OF LUMBAR REGION: ICD-10-CM

## 2024-10-16 DIAGNOSIS — R07.9 CHEST PAIN: ICD-10-CM

## 2024-10-16 LAB
ABO + RH BLD: NORMAL
BASOPHILS # BLD AUTO: 0.05 K/UL (ref 0–0.2)
BASOPHILS NFR BLD: 0.7 % (ref 0–1.9)
BLD GP AB SCN CELLS X3 SERPL QL: NORMAL
DIFFERENTIAL METHOD BLD: NORMAL
EOSINOPHIL # BLD AUTO: 0.1 K/UL (ref 0–0.5)
EOSINOPHIL NFR BLD: 1.3 % (ref 0–8)
ERYTHROCYTE [DISTWIDTH] IN BLOOD BY AUTOMATED COUNT: 13.5 % (ref 11.5–14.5)
HCT VFR BLD AUTO: 41.1 % (ref 37–48.5)
HGB BLD-MCNC: 13.7 G/DL (ref 12–16)
IMM GRANULOCYTES # BLD AUTO: 0.01 K/UL (ref 0–0.04)
IMM GRANULOCYTES NFR BLD AUTO: 0.1 % (ref 0–0.5)
LYMPHOCYTES # BLD AUTO: 2.9 K/UL (ref 1–4.8)
LYMPHOCYTES NFR BLD: 37.7 % (ref 18–48)
MCH RBC QN AUTO: 29.5 PG (ref 27–31)
MCHC RBC AUTO-ENTMCNC: 33.3 G/DL (ref 32–36)
MCV RBC AUTO: 89 FL (ref 82–98)
MONOCYTES # BLD AUTO: 0.5 K/UL (ref 0.3–1)
MONOCYTES NFR BLD: 6.8 % (ref 4–15)
NEUTROPHILS # BLD AUTO: 4.1 K/UL (ref 1.8–7.7)
NEUTROPHILS NFR BLD: 53.4 % (ref 38–73)
NRBC BLD-RTO: 0 /100 WBC
PLATELET # BLD AUTO: 356 K/UL (ref 150–450)
PMV BLD AUTO: 9.7 FL (ref 9.2–12.9)
RBC # BLD AUTO: 4.64 M/UL (ref 4–5.4)
SPECIMEN OUTDATE: NORMAL
WBC # BLD AUTO: 7.69 K/UL (ref 3.9–12.7)

## 2024-10-16 PROCEDURE — 25000003 PHARM REV CODE 250

## 2024-10-16 PROCEDURE — 22840 INSERT SPINE FIXATION DEVICE: CPT | Mod: COE,,, | Performed by: NEUROLOGICAL SURGERY

## 2024-10-16 PROCEDURE — 20930 SP BONE ALGRFT MORSEL ADD-ON: CPT | Mod: COE,,, | Performed by: NEUROLOGICAL SURGERY

## 2024-10-16 PROCEDURE — 63600175 PHARM REV CODE 636 W HCPCS: Performed by: NEUROLOGICAL SURGERY

## 2024-10-16 PROCEDURE — C1713 ANCHOR/SCREW BN/BN,TIS/BN: HCPCS | Performed by: NEUROLOGICAL SURGERY

## 2024-10-16 PROCEDURE — 37000009 HC ANESTHESIA EA ADD 15 MINS: Performed by: NEUROLOGICAL SURGERY

## 2024-10-16 PROCEDURE — 11000001 HC ACUTE MED/SURG PRIVATE ROOM

## 2024-10-16 PROCEDURE — 71000033 HC RECOVERY, INTIAL HOUR: Performed by: NEUROLOGICAL SURGERY

## 2024-10-16 PROCEDURE — 20936 SP BONE AGRFT LOCAL ADD-ON: CPT | Mod: COE,,, | Performed by: NEUROLOGICAL SURGERY

## 2024-10-16 PROCEDURE — 0KXF0ZZ TRANSFER RIGHT TRUNK MUSCLE, OPEN APPROACH: ICD-10-PCS | Performed by: NEUROLOGICAL SURGERY

## 2024-10-16 PROCEDURE — C1729 CATH, DRAINAGE: HCPCS | Performed by: NEUROLOGICAL SURGERY

## 2024-10-16 PROCEDURE — 63052 LAM FACETC/FRMT ARTHRD LUM 1: CPT | Mod: COE,,, | Performed by: NEUROLOGICAL SURGERY

## 2024-10-16 PROCEDURE — 36000711: Performed by: NEUROLOGICAL SURGERY

## 2024-10-16 PROCEDURE — 71000016 HC POSTOP RECOV ADDL HR: Performed by: NEUROLOGICAL SURGERY

## 2024-10-16 PROCEDURE — 0SG1071 FUSION OF 2 OR MORE LUMBAR VERTEBRAL JOINTS WITH AUTOLOGOUS TISSUE SUBSTITUTE, POSTERIOR APPROACH, POSTERIOR COLUMN, OPEN APPROACH: ICD-10-PCS | Performed by: NEUROLOGICAL SURGERY

## 2024-10-16 PROCEDURE — 25000003 PHARM REV CODE 250: Performed by: NEUROLOGICAL SURGERY

## 2024-10-16 PROCEDURE — 00NY0ZZ RELEASE LUMBAR SPINAL CORD, OPEN APPROACH: ICD-10-PCS | Performed by: NEUROLOGICAL SURGERY

## 2024-10-16 PROCEDURE — 21400001 HC TELEMETRY ROOM

## 2024-10-16 PROCEDURE — 01NB0ZZ RELEASE LUMBAR NERVE, OPEN APPROACH: ICD-10-PCS | Performed by: NEUROLOGICAL SURGERY

## 2024-10-16 PROCEDURE — 27000221 HC OXYGEN, UP TO 24 HOURS

## 2024-10-16 PROCEDURE — 27201423 OPTIME MED/SURG SUP & DEVICES STERILE SUPPLY: Performed by: NEUROLOGICAL SURGERY

## 2024-10-16 PROCEDURE — 85025 COMPLETE CBC W/AUTO DIFF WBC: CPT

## 2024-10-16 PROCEDURE — 0SB20ZZ EXCISION OF LUMBAR VERTEBRAL DISC, OPEN APPROACH: ICD-10-PCS | Performed by: NEUROLOGICAL SURGERY

## 2024-10-16 PROCEDURE — D9220A PRA ANESTHESIA: Mod: ANES,COE,, | Performed by: ANESTHESIOLOGY

## 2024-10-16 PROCEDURE — 22633 ARTHRD CMBN 1NTRSPC LUMBAR: CPT | Mod: 22,COE,, | Performed by: NEUROLOGICAL SURGERY

## 2024-10-16 PROCEDURE — 63600175 PHARM REV CODE 636 W HCPCS: Performed by: NURSE ANESTHETIST, CERTIFIED REGISTERED

## 2024-10-16 PROCEDURE — 22614 ARTHRD PST TQ 1NTRSPC EA ADD: CPT | Mod: COE,,, | Performed by: NEUROLOGICAL SURGERY

## 2024-10-16 PROCEDURE — 99900035 HC TECH TIME PER 15 MIN (STAT)

## 2024-10-16 PROCEDURE — 37000008 HC ANESTHESIA 1ST 15 MINUTES: Performed by: NEUROLOGICAL SURGERY

## 2024-10-16 PROCEDURE — 36000710: Performed by: NEUROLOGICAL SURGERY

## 2024-10-16 PROCEDURE — D9220A PRA ANESTHESIA: Mod: CRNA,COE,, | Performed by: NURSE ANESTHETIST, CERTIFIED REGISTERED

## 2024-10-16 PROCEDURE — 27800903 OPTIME MED/SURG SUP & DEVICES OTHER IMPLANTS: Performed by: NEUROLOGICAL SURGERY

## 2024-10-16 PROCEDURE — 15734 MUSCLE-SKIN GRAFT TRUNK: CPT | Mod: 51,COE,, | Performed by: NEUROLOGICAL SURGERY

## 2024-10-16 PROCEDURE — 25000003 PHARM REV CODE 250: Performed by: NURSE ANESTHETIST, CERTIFIED REGISTERED

## 2024-10-16 PROCEDURE — 22853 INSJ BIOMECHANICAL DEVICE: CPT | Mod: COE,,, | Performed by: NEUROLOGICAL SURGERY

## 2024-10-16 PROCEDURE — 86850 RBC ANTIBODY SCREEN: CPT

## 2024-10-16 PROCEDURE — 0SG00AJ FUSION OF LUMBAR VERTEBRAL JOINT WITH INTERBODY FUSION DEVICE, POSTERIOR APPROACH, ANTERIOR COLUMN, OPEN APPROACH: ICD-10-PCS | Performed by: NEUROLOGICAL SURGERY

## 2024-10-16 PROCEDURE — 63600175 PHARM REV CODE 636 W HCPCS: Performed by: ANESTHESIOLOGY

## 2024-10-16 PROCEDURE — 0KXG0ZZ TRANSFER LEFT TRUNK MUSCLE, OPEN APPROACH: ICD-10-PCS | Performed by: NEUROLOGICAL SURGERY

## 2024-10-16 PROCEDURE — 71000015 HC POSTOP RECOV 1ST HR: Performed by: NEUROLOGICAL SURGERY

## 2024-10-16 PROCEDURE — 94761 N-INVAS EAR/PLS OXIMETRY MLT: CPT

## 2024-10-16 PROCEDURE — 63600175 PHARM REV CODE 636 W HCPCS

## 2024-10-16 DEVICE — IMPLANTABLE DEVICE: Type: IMPLANTABLE DEVICE | Site: SPINE LUMBAR | Status: FUNCTIONAL

## 2024-10-16 DEVICE — SCREW BONE SPINAL 5.5 7 X 45MM: Type: IMPLANTABLE DEVICE | Site: SPINE LUMBAR | Status: FUNCTIONAL

## 2024-10-16 DEVICE — ROD SPINAL EXPEDIUM 5.5X75MM: Type: IMPLANTABLE DEVICE | Site: SPINE LUMBAR | Status: FUNCTIONAL

## 2024-10-16 DEVICE — SET SCREW EXPEDIUM VERSE UNITZ: Type: IMPLANTABLE DEVICE | Site: SPINE LUMBAR | Status: FUNCTIONAL

## 2024-10-16 DEVICE — SCREW INNER SINGLE SET TITANIU: Type: IMPLANTABLE DEVICE | Site: SPINE LUMBAR | Status: FUNCTIONAL

## 2024-10-16 DEVICE — EIT PLIF, H 10MM, 4°, 22/9
Type: IMPLANTABLE DEVICE | Site: SPINE LUMBAR | Status: FUNCTIONAL
Brand: EIT PLIF CAGE

## 2024-10-16 DEVICE — BONE GRAFT KIT 7510200 INFUSE SMALL
Type: IMPLANTABLE DEVICE | Site: SPINE LUMBAR | Status: FUNCTIONAL
Brand: INFUSE® BONE GRAFT

## 2024-10-16 DEVICE — SCREW EXPEDIUM VERSE 5.5 6X45: Type: IMPLANTABLE DEVICE | Site: SPINE LUMBAR | Status: FUNCTIONAL

## 2024-10-16 RX ORDER — EPHEDRINE SULFATE 50 MG/ML
INJECTION, SOLUTION INTRAVENOUS
Status: DISCONTINUED | OUTPATIENT
Start: 2024-10-16 | End: 2024-10-16

## 2024-10-16 RX ORDER — PROPOFOL 10 MG/ML
INJECTION, EMULSION INTRAVENOUS CONTINUOUS PRN
Status: DISCONTINUED | OUTPATIENT
Start: 2024-10-16 | End: 2024-10-16

## 2024-10-16 RX ORDER — OXYCODONE HYDROCHLORIDE 10 MG/1
10 TABLET ORAL EVERY 4 HOURS PRN
Status: DISCONTINUED | OUTPATIENT
Start: 2024-10-16 | End: 2024-10-18

## 2024-10-16 RX ORDER — MIDAZOLAM HYDROCHLORIDE 1 MG/ML
INJECTION INTRAMUSCULAR; INTRAVENOUS
Status: DISCONTINUED | OUTPATIENT
Start: 2024-10-16 | End: 2024-10-16

## 2024-10-16 RX ORDER — ACETAMINOPHEN 325 MG/1
650 TABLET ORAL EVERY 6 HOURS
Status: DISCONTINUED | OUTPATIENT
Start: 2024-10-16 | End: 2024-10-19 | Stop reason: HOSPADM

## 2024-10-16 RX ORDER — BISACODYL 10 MG/1
10 SUPPOSITORY RECTAL DAILY
Status: DISCONTINUED | OUTPATIENT
Start: 2024-10-16 | End: 2024-10-16

## 2024-10-16 RX ORDER — ALPRAZOLAM 0.5 MG/1
0.5 TABLET ORAL 2 TIMES DAILY PRN
Status: DISCONTINUED | OUTPATIENT
Start: 2024-10-16 | End: 2024-10-19 | Stop reason: HOSPADM

## 2024-10-16 RX ORDER — OXYCODONE HYDROCHLORIDE 5 MG/1
5 TABLET ORAL EVERY 4 HOURS PRN
Status: DISCONTINUED | OUTPATIENT
Start: 2024-10-16 | End: 2024-10-16

## 2024-10-16 RX ORDER — FENTANYL CITRATE 50 UG/ML
INJECTION, SOLUTION INTRAMUSCULAR; INTRAVENOUS
Status: DISCONTINUED | OUTPATIENT
Start: 2024-10-16 | End: 2024-10-16

## 2024-10-16 RX ORDER — MUPIROCIN 20 MG/G
OINTMENT TOPICAL
Status: DISCONTINUED | OUTPATIENT
Start: 2024-10-16 | End: 2024-10-16 | Stop reason: HOSPADM

## 2024-10-16 RX ORDER — CEFAZOLIN SODIUM 1 G/3ML
INJECTION, POWDER, FOR SOLUTION INTRAMUSCULAR; INTRAVENOUS
Status: DISCONTINUED | OUTPATIENT
Start: 2024-10-16 | End: 2024-10-16

## 2024-10-16 RX ORDER — GLUCAGON 1 MG
1 KIT INJECTION
Status: DISCONTINUED | OUTPATIENT
Start: 2024-10-16 | End: 2024-10-16 | Stop reason: HOSPADM

## 2024-10-16 RX ORDER — HALOPERIDOL 5 MG/ML
0.5 INJECTION INTRAMUSCULAR EVERY 10 MIN PRN
Status: DISCONTINUED | OUTPATIENT
Start: 2024-10-16 | End: 2024-10-16 | Stop reason: HOSPADM

## 2024-10-16 RX ORDER — DEXAMETHASONE SODIUM PHOSPHATE 4 MG/ML
INJECTION, SOLUTION INTRA-ARTICULAR; INTRALESIONAL; INTRAMUSCULAR; INTRAVENOUS; SOFT TISSUE
Status: DISCONTINUED | OUTPATIENT
Start: 2024-10-16 | End: 2024-10-16

## 2024-10-16 RX ORDER — CEFAZOLIN 2 G/1
2 INJECTION, POWDER, FOR SOLUTION INTRAMUSCULAR; INTRAVENOUS
Status: DISCONTINUED | OUTPATIENT
Start: 2024-10-16 | End: 2024-10-16

## 2024-10-16 RX ORDER — HYDROMORPHONE HYDROCHLORIDE 1 MG/ML
2 INJECTION, SOLUTION INTRAMUSCULAR; INTRAVENOUS; SUBCUTANEOUS
Status: DISCONTINUED | OUTPATIENT
Start: 2024-10-16 | End: 2024-10-18

## 2024-10-16 RX ORDER — FENTANYL CITRATE 50 UG/ML
25 INJECTION, SOLUTION INTRAMUSCULAR; INTRAVENOUS EVERY 5 MIN PRN
Status: DISCONTINUED | OUTPATIENT
Start: 2024-10-16 | End: 2024-10-16 | Stop reason: HOSPADM

## 2024-10-16 RX ORDER — ONDANSETRON HYDROCHLORIDE 2 MG/ML
INJECTION, SOLUTION INTRAVENOUS
Status: DISCONTINUED | OUTPATIENT
Start: 2024-10-16 | End: 2024-10-16

## 2024-10-16 RX ORDER — SUCCINYLCHOLINE CHLORIDE 20 MG/ML
INJECTION INTRAMUSCULAR; INTRAVENOUS
Status: DISCONTINUED | OUTPATIENT
Start: 2024-10-16 | End: 2024-10-16

## 2024-10-16 RX ORDER — TIZANIDINE 4 MG/1
4 TABLET ORAL EVERY 6 HOURS PRN
Status: DISCONTINUED | OUTPATIENT
Start: 2024-10-16 | End: 2024-10-19 | Stop reason: HOSPADM

## 2024-10-16 RX ORDER — LIDOCAINE HYDROCHLORIDE 20 MG/ML
INJECTION, SOLUTION EPIDURAL; INFILTRATION; INTRACAUDAL; PERINEURAL
Status: DISCONTINUED | OUTPATIENT
Start: 2024-10-16 | End: 2024-10-16

## 2024-10-16 RX ORDER — MUPIROCIN 20 MG/G
OINTMENT TOPICAL 2 TIMES DAILY
Status: DISPENSED | OUTPATIENT
Start: 2024-10-16 | End: 2024-10-19

## 2024-10-16 RX ORDER — TOPIRAMATE 25 MG/1
25 TABLET ORAL DAILY
Status: DISCONTINUED | OUTPATIENT
Start: 2024-10-17 | End: 2024-10-19 | Stop reason: HOSPADM

## 2024-10-16 RX ORDER — ROCURONIUM BROMIDE 10 MG/ML
INJECTION, SOLUTION INTRAVENOUS
Status: DISCONTINUED | OUTPATIENT
Start: 2024-10-16 | End: 2024-10-16

## 2024-10-16 RX ORDER — ALUMINUM HYDROXIDE, MAGNESIUM HYDROXIDE, AND SIMETHICONE 1200; 120; 1200 MG/30ML; MG/30ML; MG/30ML
30 SUSPENSION ORAL EVERY 4 HOURS PRN
Status: DISCONTINUED | OUTPATIENT
Start: 2024-10-16 | End: 2024-10-19 | Stop reason: HOSPADM

## 2024-10-16 RX ORDER — CEFAZOLIN 2 G/1
2 INJECTION, POWDER, FOR SOLUTION INTRAMUSCULAR; INTRAVENOUS
Status: DISCONTINUED | OUTPATIENT
Start: 2024-10-16 | End: 2024-10-16 | Stop reason: HOSPADM

## 2024-10-16 RX ORDER — HYDROMORPHONE HYDROCHLORIDE 1 MG/ML
0.2 INJECTION, SOLUTION INTRAMUSCULAR; INTRAVENOUS; SUBCUTANEOUS EVERY 5 MIN PRN
Status: DISCONTINUED | OUTPATIENT
Start: 2024-10-16 | End: 2024-10-16 | Stop reason: HOSPADM

## 2024-10-16 RX ORDER — MUPIROCIN 20 MG/G
1 OINTMENT TOPICAL 2 TIMES DAILY
Status: DISCONTINUED | OUTPATIENT
Start: 2024-10-16 | End: 2024-10-16 | Stop reason: HOSPADM

## 2024-10-16 RX ORDER — PROCHLORPERAZINE EDISYLATE 5 MG/ML
5 INJECTION INTRAMUSCULAR; INTRAVENOUS EVERY 6 HOURS PRN
Status: DISCONTINUED | OUTPATIENT
Start: 2024-10-16 | End: 2024-10-19 | Stop reason: HOSPADM

## 2024-10-16 RX ORDER — DULOXETIN HYDROCHLORIDE 60 MG/1
60 CAPSULE, DELAYED RELEASE ORAL DAILY
Status: DISCONTINUED | OUTPATIENT
Start: 2024-10-17 | End: 2024-10-19 | Stop reason: HOSPADM

## 2024-10-16 RX ORDER — ONDANSETRON 8 MG/1
8 TABLET, ORALLY DISINTEGRATING ORAL EVERY 6 HOURS PRN
Status: DISCONTINUED | OUTPATIENT
Start: 2024-10-16 | End: 2024-10-19 | Stop reason: HOSPADM

## 2024-10-16 RX ORDER — PROPOFOL 10 MG/ML
VIAL (ML) INTRAVENOUS
Status: DISCONTINUED | OUTPATIENT
Start: 2024-10-16 | End: 2024-10-16

## 2024-10-16 RX ORDER — OXYCODONE HYDROCHLORIDE 5 MG/1
5 TABLET ORAL
Status: DISCONTINUED | OUTPATIENT
Start: 2024-10-16 | End: 2024-10-16 | Stop reason: HOSPADM

## 2024-10-16 RX ORDER — BISACODYL 10 MG/1
10 SUPPOSITORY RECTAL DAILY PRN
Status: DISCONTINUED | OUTPATIENT
Start: 2024-10-16 | End: 2024-10-19 | Stop reason: HOSPADM

## 2024-10-16 RX ORDER — OXYCODONE HYDROCHLORIDE 10 MG/1
10 TABLET ORAL EVERY 4 HOURS PRN
Status: DISCONTINUED | OUTPATIENT
Start: 2024-10-16 | End: 2024-10-16

## 2024-10-16 RX ORDER — VANCOMYCIN HYDROCHLORIDE 1 G/20ML
INJECTION, POWDER, LYOPHILIZED, FOR SOLUTION INTRAVENOUS
Status: DISCONTINUED | OUTPATIENT
Start: 2024-10-16 | End: 2024-10-16 | Stop reason: HOSPADM

## 2024-10-16 RX ORDER — AMOXICILLIN 250 MG
2 CAPSULE ORAL NIGHTLY PRN
Status: DISCONTINUED | OUTPATIENT
Start: 2024-10-16 | End: 2024-10-18

## 2024-10-16 RX ORDER — HYDRALAZINE HYDROCHLORIDE 20 MG/ML
10 INJECTION INTRAMUSCULAR; INTRAVENOUS EVERY 6 HOURS PRN
Status: DISCONTINUED | OUTPATIENT
Start: 2024-10-16 | End: 2024-10-19 | Stop reason: HOSPADM

## 2024-10-16 RX ORDER — LOSARTAN POTASSIUM AND HYDROCHLOROTHIAZIDE 12.5; 5 MG/1; MG/1
1 TABLET ORAL DAILY
Status: DISCONTINUED | OUTPATIENT
Start: 2024-10-17 | End: 2024-10-19 | Stop reason: HOSPADM

## 2024-10-16 RX ORDER — LIDOCAINE HYDROCHLORIDE AND EPINEPHRINE 10; 10 MG/ML; UG/ML
INJECTION, SOLUTION INFILTRATION; PERINEURAL
Status: DISCONTINUED | OUTPATIENT
Start: 2024-10-16 | End: 2024-10-16 | Stop reason: HOSPADM

## 2024-10-16 RX ORDER — CEFAZOLIN 2 G/1
2 INJECTION, POWDER, FOR SOLUTION INTRAMUSCULAR; INTRAVENOUS
Status: DISCONTINUED | OUTPATIENT
Start: 2024-10-16 | End: 2024-10-19 | Stop reason: HOSPADM

## 2024-10-16 RX ORDER — POLYETHYLENE GLYCOL 3350 17 G/17G
17 POWDER, FOR SOLUTION ORAL DAILY
Status: DISCONTINUED | OUTPATIENT
Start: 2024-10-17 | End: 2024-10-19

## 2024-10-16 RX ORDER — GABAPENTIN 400 MG/1
400 CAPSULE ORAL 3 TIMES DAILY
Status: DISCONTINUED | OUTPATIENT
Start: 2024-10-16 | End: 2024-10-19 | Stop reason: HOSPADM

## 2024-10-16 RX ADMIN — PHENYLEPHRINE HYDROCHLORIDE 0.2 MCG/KG/MIN: 10 INJECTION INTRAVENOUS at 11:10

## 2024-10-16 RX ADMIN — HYDROMORPHONE HYDROCHLORIDE 0.2 MG: 1 INJECTION, SOLUTION INTRAMUSCULAR; INTRAVENOUS; SUBCUTANEOUS at 03:10

## 2024-10-16 RX ADMIN — ROCURONIUM BROMIDE 40 MG: 10 INJECTION, SOLUTION INTRAVENOUS at 12:10

## 2024-10-16 RX ADMIN — HYDROMORPHONE HYDROCHLORIDE 0.2 MG: 1 INJECTION, SOLUTION INTRAMUSCULAR; INTRAVENOUS; SUBCUTANEOUS at 02:10

## 2024-10-16 RX ADMIN — OXYCODONE HYDROCHLORIDE 10 MG: 10 TABLET ORAL at 08:10

## 2024-10-16 RX ADMIN — EPHEDRINE SULFATE 10 MG: 50 INJECTION INTRAVENOUS at 11:10

## 2024-10-16 RX ADMIN — PHENYLEPHRINE HYDROCHLORIDE 0.1 MCG/KG/MIN: 10 INJECTION INTRAVENOUS at 12:10

## 2024-10-16 RX ADMIN — ACETAMINOPHEN 650 MG: 325 TABLET ORAL at 11:10

## 2024-10-16 RX ADMIN — ROCURONIUM BROMIDE 5 MG: 10 INJECTION, SOLUTION INTRAVENOUS at 11:10

## 2024-10-16 RX ADMIN — SODIUM CHLORIDE 0.2 MCG/KG/MIN: 9 INJECTION, SOLUTION INTRAVENOUS at 11:10

## 2024-10-16 RX ADMIN — CEFAZOLIN 2 G: 330 INJECTION, POWDER, FOR SOLUTION INTRAMUSCULAR; INTRAVENOUS at 11:10

## 2024-10-16 RX ADMIN — DEXAMETHASONE SODIUM PHOSPHATE 10 MG: 4 INJECTION, SOLUTION INTRAMUSCULAR; INTRAVENOUS at 12:10

## 2024-10-16 RX ADMIN — HYDROMORPHONE HYDROCHLORIDE 2 MG: 1 INJECTION, SOLUTION INTRAMUSCULAR; INTRAVENOUS; SUBCUTANEOUS at 05:10

## 2024-10-16 RX ADMIN — CEFAZOLIN 2 G: 2 INJECTION, POWDER, FOR SOLUTION INTRAMUSCULAR; INTRAVENOUS at 08:10

## 2024-10-16 RX ADMIN — PROPOFOL 20 MG: 10 INJECTION, EMULSION INTRAVENOUS at 11:10

## 2024-10-16 RX ADMIN — EPHEDRINE SULFATE 5 MG: 50 INJECTION INTRAVENOUS at 12:10

## 2024-10-16 RX ADMIN — ACETAMINOPHEN 650 MG: 325 TABLET ORAL at 05:10

## 2024-10-16 RX ADMIN — MIDAZOLAM HYDROCHLORIDE 2 MG: 2 INJECTION, SOLUTION INTRAMUSCULAR; INTRAVENOUS at 11:10

## 2024-10-16 RX ADMIN — GABAPENTIN 400 MG: 400 CAPSULE ORAL at 08:10

## 2024-10-16 RX ADMIN — MUPIROCIN: 20 OINTMENT TOPICAL at 10:10

## 2024-10-16 RX ADMIN — HYDROMORPHONE HYDROCHLORIDE 2 MG: 1 INJECTION, SOLUTION INTRAMUSCULAR; INTRAVENOUS; SUBCUTANEOUS at 10:10

## 2024-10-16 RX ADMIN — GLYCOPYRROLATE 0.2 MG: 0.2 INJECTION, SOLUTION INTRAMUSCULAR; INTRAVENOUS at 11:10

## 2024-10-16 RX ADMIN — EPHEDRINE SULFATE 10 MG: 50 INJECTION INTRAVENOUS at 12:10

## 2024-10-16 RX ADMIN — PROPOFOL 140 MG: 10 INJECTION, EMULSION INTRAVENOUS at 11:10

## 2024-10-16 RX ADMIN — TIZANIDINE 4 MG: 4 TABLET ORAL at 08:10

## 2024-10-16 RX ADMIN — LIDOCAINE HYDROCHLORIDE 100 MG: 20 INJECTION, SOLUTION EPIDURAL; INFILTRATION; INTRACAUDAL; PERINEURAL at 11:10

## 2024-10-16 RX ADMIN — EPHEDRINE SULFATE 5 MG: 50 INJECTION INTRAVENOUS at 01:10

## 2024-10-16 RX ADMIN — EPHEDRINE SULFATE 10 MG: 50 INJECTION INTRAVENOUS at 01:10

## 2024-10-16 RX ADMIN — SODIUM CHLORIDE: 0.9 INJECTION, SOLUTION INTRAVENOUS at 11:10

## 2024-10-16 RX ADMIN — PROPOFOL 125 MCG/KG/MIN: 10 INJECTION, EMULSION INTRAVENOUS at 11:10

## 2024-10-16 RX ADMIN — FENTANYL CITRATE 100 MCG: 50 INJECTION, SOLUTION INTRAMUSCULAR; INTRAVENOUS at 11:10

## 2024-10-16 RX ADMIN — SUCCINYLCHOLINE 140 MG: 20 INJECTION, SOLUTION INTRAMUSCULAR; INTRAVENOUS at 11:10

## 2024-10-16 RX ADMIN — PROPOFOL 40 MG: 10 INJECTION, EMULSION INTRAVENOUS at 11:10

## 2024-10-16 RX ADMIN — ONDANSETRON 4 MG: 2 INJECTION INTRAMUSCULAR; INTRAVENOUS at 02:10

## 2024-10-16 RX ADMIN — OXYCODONE HYDROCHLORIDE 15 MG: 10 TABLET ORAL at 03:10

## 2024-10-16 NOTE — INTERVAL H&P NOTE
The patient has been examined and the H&P has been reviewed:    I concur with the findings and no changes have occurred since H&P was written. Right sided low back pian, bilateral shooting nerve pain down the hamstring area, stops above knee.     Surgery risks, benefits and alternative options discussed and understood by patient/family. Will proceed with revision/extension PSF L3-5. New blood consent signed, surgical consent in media. Site marked. Shunt XR ordered to check setting after MRI.           There are no hospital problems to display for this patient.

## 2024-10-16 NOTE — PROGRESS NOTES
Patient has an allergy to silicone based adhesives. [place] Trigger heel protector pads not applied for this reason.

## 2024-10-16 NOTE — BRIEF OP NOTE
[facility] - Surgery ([facility])  Brief Operative Note    SUMMARY     Surgery Date: 10/16/2024     Surgeons and Role:     * Quentin Meek MD - Primary    Resident - Assisting     * Ashley Murillo MD - Resident - Assisting        Pre-op Diagnosis:  DDD (degenerative disc disease), lumbar [M51.36]  Other spondylosis with radiculopathy, lumbar region [M47.26]    Post-op Diagnosis:  Post-Op Diagnosis Codes:     * DDD (degenerative disc disease), lumbar [M51.369]     * Other spondylosis with radiculopathy, lumbar region [M47.26]    Procedure(s) (LRB):  FUSION, SPINE (N/A)    Anesthesia: General    Implants:  Implant Name Type Inv. Item Serial No.  Lot No. LRB No. Used Action   KIT BONE GRFT BMP SM - [id number]  KIT BONE GRFT BMP SM  [employer] [facility] [id number] N/A 1 Implanted   SCREW EXPEDIUM VERSE 5.5 6X45 - [id number]  SCREW EXPEDIUM VERSE 5.5 6X45  [employer]  N/A 2 Implanted   SCREW EXPEDIUM VERSE PA 7X40MM - [id number]  SCREW EXPEDIUM VERSE PA 7X40MM  [employer]  N/A 2 Implanted   SET SCREW EXPEDIUM VERSE UNITZ - [id number]  SET SCREW EXPEDIUM VERSE UNITZ  [employer]  N/A 4 Implanted   SCREW BONE SPINAL 5.5 7 X 45MM - [id number]  SCREW BONE SPINAL 5.5 7 X 45MM  [employer]  N/A 2 Implanted   SCREW INNER SINGLE SET TITANIU - [id number]  SCREW INNER SINGLE SET TITANIU  [employer]  N/A 2 Implanted   CAGE POST LUM LORDTC 10MM 9X22 - [id number]  CAGE POST LUM LORDTC 10MM 9X22  [employer] [id number] N/A 1 Implanted   KENDRICK SPINAL EXPEDIUM 5.5X75MM - [id number]  KENDRICK SPINAL EXPEDIUM 5.5X75MM  [employer]  N/A 2 Implanted       Operative Findings: removal of prior L4-5 screws and rods, placement of new screws and L3-4 TLIF cage with rosenberg laminectomy across L3. Deep hemovac, provena.     Estimated Blood Loss: 300 mL    Estimated Blood Loss has been documented.         Specimens:   Specimen (24h ago, onward)      None            [id number]

## 2024-10-16 NOTE — NURSING TRANSFER
Nursing Transfer Note      10/16/2024   4:31 PM    Pt transported to 506 on bed c pt belongings, wound vac, lso brace at bedside.  VSS, denies sob, n/v.  States mild/moderate tolerable pain to incisional back.  Report given to receiving floor rn, family updated

## 2024-10-17 ENCOUNTER — TELEPHONE (OUTPATIENT)
Dept: NEUROSURGERY | Facility: CLINIC | Age: 54
End: 2024-10-17
Payer: COMMERCIAL

## 2024-10-17 LAB
ANION GAP SERPL CALC-SCNC: 9 MMOL/L (ref 8–16)
BASOPHILS # BLD AUTO: 0.03 K/UL (ref 0–0.2)
BASOPHILS NFR BLD: 0.2 % (ref 0–1.9)
BUN SERPL-MCNC: 11 MG/DL (ref 6–20)
CALCIUM SERPL-MCNC: 8.9 MG/DL (ref 8.7–10.5)
CHLORIDE SERPL-SCNC: 103 MMOL/L (ref 95–110)
CO2 SERPL-SCNC: 23 MMOL/L (ref 23–29)
CREAT SERPL-MCNC: 0.8 MG/DL (ref 0.5–1.4)
DIFFERENTIAL METHOD BLD: ABNORMAL
EOSINOPHIL # BLD AUTO: 0 K/UL (ref 0–0.5)
EOSINOPHIL NFR BLD: 0.1 % (ref 0–8)
ERYTHROCYTE [DISTWIDTH] IN BLOOD BY AUTOMATED COUNT: 13.5 % (ref 11.5–14.5)
EST. GFR  (NO RACE VARIABLE): >60 ML/MIN/1.73 M^2
GLUCOSE SERPL-MCNC: 97 MG/DL (ref 70–110)
HCT VFR BLD AUTO: 35.1 % (ref 37–48.5)
HGB BLD-MCNC: 11.5 G/DL (ref 12–16)
IMM GRANULOCYTES # BLD AUTO: 0.09 K/UL (ref 0–0.04)
IMM GRANULOCYTES NFR BLD AUTO: 0.5 % (ref 0–0.5)
LYMPHOCYTES # BLD AUTO: 2.4 K/UL (ref 1–4.8)
LYMPHOCYTES NFR BLD: 13.5 % (ref 18–48)
MCH RBC QN AUTO: 29.8 PG (ref 27–31)
MCHC RBC AUTO-ENTMCNC: 32.8 G/DL (ref 32–36)
MCV RBC AUTO: 91 FL (ref 82–98)
MONOCYTES # BLD AUTO: 1.5 K/UL (ref 0.3–1)
MONOCYTES NFR BLD: 8.7 % (ref 4–15)
NEUTROPHILS # BLD AUTO: 13.6 K/UL (ref 1.8–7.7)
NEUTROPHILS NFR BLD: 77 % (ref 38–73)
NRBC BLD-RTO: 0 /100 WBC
OHS QRS DURATION: 104 MS
OHS QTC CALCULATION: 429 MS
PLATELET # BLD AUTO: 381 K/UL (ref 150–450)
PMV BLD AUTO: 9.6 FL (ref 9.2–12.9)
POTASSIUM SERPL-SCNC: 3.9 MMOL/L (ref 3.5–5.1)
RBC # BLD AUTO: 3.86 M/UL (ref 4–5.4)
SODIUM SERPL-SCNC: 135 MMOL/L (ref 136–145)
TROPONIN I SERPL DL<=0.01 NG/ML-MCNC: <0.006 NG/ML (ref 0–0.03)
WBC # BLD AUTO: 17.68 K/UL (ref 3.9–12.7)

## 2024-10-17 PROCEDURE — 93010 ELECTROCARDIOGRAM REPORT: CPT | Mod: COE,,, | Performed by: INTERNAL MEDICINE

## 2024-10-17 PROCEDURE — 97535 SELF CARE MNGMENT TRAINING: CPT

## 2024-10-17 PROCEDURE — 85025 COMPLETE CBC W/AUTO DIFF WBC: CPT | Performed by: PHYSICIAN ASSISTANT

## 2024-10-17 PROCEDURE — 36415 COLL VENOUS BLD VENIPUNCTURE: CPT | Performed by: PHYSICIAN ASSISTANT

## 2024-10-17 PROCEDURE — 63600175 PHARM REV CODE 636 W HCPCS: Performed by: PHYSICIAN ASSISTANT

## 2024-10-17 PROCEDURE — 80048 BASIC METABOLIC PNL TOTAL CA: CPT | Performed by: PHYSICIAN ASSISTANT

## 2024-10-17 PROCEDURE — 99024 POSTOP FOLLOW-UP VISIT: CPT | Mod: COE,,, | Performed by: PHYSICIAN ASSISTANT

## 2024-10-17 PROCEDURE — 93005 ELECTROCARDIOGRAM TRACING: CPT

## 2024-10-17 PROCEDURE — 25000003 PHARM REV CODE 250: Performed by: PHYSICIAN ASSISTANT

## 2024-10-17 PROCEDURE — 97116 GAIT TRAINING THERAPY: CPT

## 2024-10-17 PROCEDURE — 63600175 PHARM REV CODE 636 W HCPCS

## 2024-10-17 PROCEDURE — 97161 PT EVAL LOW COMPLEX 20 MIN: CPT

## 2024-10-17 PROCEDURE — 25000003 PHARM REV CODE 250

## 2024-10-17 PROCEDURE — 63600175 PHARM REV CODE 636 W HCPCS: Performed by: NEUROLOGICAL SURGERY

## 2024-10-17 PROCEDURE — 21400001 HC TELEMETRY ROOM

## 2024-10-17 PROCEDURE — 97165 OT EVAL LOW COMPLEX 30 MIN: CPT

## 2024-10-17 PROCEDURE — 84484 ASSAY OF TROPONIN QUANT: CPT | Performed by: PHYSICIAN ASSISTANT

## 2024-10-17 RX ORDER — FLUCONAZOLE 150 MG/1
150 TABLET ORAL ONCE
Status: COMPLETED | OUTPATIENT
Start: 2024-10-17 | End: 2024-10-17

## 2024-10-17 RX ORDER — ENOXAPARIN SODIUM 100 MG/ML
40 INJECTION SUBCUTANEOUS EVERY 24 HOURS
Status: DISCONTINUED | OUTPATIENT
Start: 2024-10-17 | End: 2024-10-19 | Stop reason: HOSPADM

## 2024-10-17 RX ORDER — FAMOTIDINE 20 MG/1
20 TABLET, FILM COATED ORAL 2 TIMES DAILY
Status: DISCONTINUED | OUTPATIENT
Start: 2024-10-17 | End: 2024-10-19 | Stop reason: HOSPADM

## 2024-10-17 RX ORDER — FAMOTIDINE 20 MG/1
20 TABLET, FILM COATED ORAL 2 TIMES DAILY
Status: DISCONTINUED | OUTPATIENT
Start: 2024-10-17 | End: 2024-10-17

## 2024-10-17 RX ORDER — CALCIUM CARBONATE 200(500)MG
500 TABLET,CHEWABLE ORAL 3 TIMES DAILY PRN
Status: DISCONTINUED | OUTPATIENT
Start: 2024-10-17 | End: 2024-10-19 | Stop reason: HOSPADM

## 2024-10-17 RX ADMIN — ONDANSETRON 8 MG: 8 TABLET, ORALLY DISINTEGRATING ORAL at 04:10

## 2024-10-17 RX ADMIN — GABAPENTIN 400 MG: 400 CAPSULE ORAL at 09:10

## 2024-10-17 RX ADMIN — POLYETHYLENE GLYCOL 3350 17 G: 17 POWDER, FOR SOLUTION ORAL at 09:10

## 2024-10-17 RX ADMIN — HYDROMORPHONE HYDROCHLORIDE 2 MG: 1 INJECTION, SOLUTION INTRAMUSCULAR; INTRAVENOUS; SUBCUTANEOUS at 01:10

## 2024-10-17 RX ADMIN — DULOXETINE HYDROCHLORIDE 60 MG: 60 CAPSULE, DELAYED RELEASE ORAL at 09:10

## 2024-10-17 RX ADMIN — TIZANIDINE 4 MG: 4 TABLET ORAL at 03:10

## 2024-10-17 RX ADMIN — CEFAZOLIN 2 G: 2 INJECTION, POWDER, FOR SOLUTION INTRAMUSCULAR; INTRAVENOUS at 11:10

## 2024-10-17 RX ADMIN — MUPIROCIN: 20 OINTMENT TOPICAL at 09:10

## 2024-10-17 RX ADMIN — FAMOTIDINE 20 MG: 20 TABLET ORAL at 06:10

## 2024-10-17 RX ADMIN — ACETAMINOPHEN 650 MG: 325 TABLET ORAL at 05:10

## 2024-10-17 RX ADMIN — ENOXAPARIN SODIUM 40 MG: 40 INJECTION SUBCUTANEOUS at 05:10

## 2024-10-17 RX ADMIN — CEFAZOLIN 2 G: 2 INJECTION, POWDER, FOR SOLUTION INTRAMUSCULAR; INTRAVENOUS at 09:10

## 2024-10-17 RX ADMIN — OXYCODONE HYDROCHLORIDE 15 MG: 10 TABLET ORAL at 09:10

## 2024-10-17 RX ADMIN — ACETAMINOPHEN 650 MG: 325 TABLET ORAL at 11:10

## 2024-10-17 RX ADMIN — CALCIUM CARBONATE (ANTACID) CHEW TAB 500 MG 500 MG: 500 CHEW TAB at 10:10

## 2024-10-17 RX ADMIN — CEFAZOLIN 2 G: 2 INJECTION, POWDER, FOR SOLUTION INTRAMUSCULAR; INTRAVENOUS at 04:10

## 2024-10-17 RX ADMIN — OXYCODONE HYDROCHLORIDE 10 MG: 10 TABLET ORAL at 04:10

## 2024-10-17 RX ADMIN — FAMOTIDINE 20 MG: 20 TABLET ORAL at 09:10

## 2024-10-17 RX ADMIN — FLUCONAZOLE 150 MG: 150 TABLET ORAL at 09:10

## 2024-10-17 RX ADMIN — OXYCODONE HYDROCHLORIDE 15 MG: 10 TABLET ORAL at 12:10

## 2024-10-17 RX ADMIN — TOPIRAMATE 25 MG: 25 TABLET, FILM COATED ORAL at 09:10

## 2024-10-17 RX ADMIN — GABAPENTIN 400 MG: 400 CAPSULE ORAL at 03:10

## 2024-10-17 RX ADMIN — ALPRAZOLAM 0.5 MG: 0.5 TABLET ORAL at 09:10

## 2024-10-17 RX ADMIN — ALPRAZOLAM 0.5 MG: 0.5 TABLET ORAL at 12:10

## 2024-10-17 RX ADMIN — LOSARTAN POTASSIUM AND HYDROCHLOROTHIAZIDE 1 TABLET: 50; 12.5 TABLET, FILM COATED ORAL at 09:10

## 2024-10-17 RX ADMIN — OXYCODONE HYDROCHLORIDE 10 MG: 10 TABLET ORAL at 10:10

## 2024-10-17 NOTE — ASSESSMENT & PLAN NOTE
Ina Blair is a 53 y.o. female with history of hydrocephalus status post shunt, Codman Hakim set at 100, prior L4-5 TLIF, and most recently PCF C3-6 with Dr. Meek on 3/20/24. Now s/p revision and extension PSF L3-5 with Dr. Meek on 10/16.    - Neurologically stable  - Pain control: continue current regimen  - [place] to POD5  - Drains: HV to gravity off suction. Abx while drain in place.  - Post op imaging showing satisfactory placement of hardware.   - Brace: LSO when OOB  - Continue PT/OT/OOB. OOB > 6hrs/day  - GI: Diet as tolerated. Continue bowel regimen.   - Voiding spontaneously s/p burnett removal, PVR 0. Bladder scan prn.  - DVT ppx: LVX, Compression stockings, SCDs  - Atelectasis ppx: IS at bedside. Encourage use every hour while awake.  - Discussed with Dr. Meek    - Dispo: Pending drain removal

## 2024-10-17 NOTE — PLAN OF CARE
OT eval complete. OT POC and goals established.   Problem: Occupational Therapy  Goal: Occupational Therapy Goal  Description: Goals to be met by: 10/31/24     Patient will increase functional independence with ADLs by performing:    UE Dressing with Modified [place].  LE Dressing with Modified [place].  Grooming while standing at sink with Modified [place].  Toileting from toilet/bedside commode with Modified [place] for hygiene and clothing management.   Toilet transfer to toilet/bedside commode with Modified [place].    Outcome: Progressing

## 2024-10-17 NOTE — PLAN OF CARE
Problem: Physical Therapy  Goal: Physical Therapy Goal  Description: PT goals until 10/24/24    1. Pt supine to sit with mod independent-not met  2. Pt sit to supine with mod independent-not met  3. Pt sit to stand with no AD with supervision-not met  4. Pt to perform gait 200ft with no AD with supervision.-not met  5. Pt to up/down 2 steps with no rail with HHA with CGA.-not met  Outcome: Progressing   Pt's goals set and pt will benefit from skilled PT services to work towards improved functional mobility including: bed mobility, transfers, up/down steps, and gait. Aleida Arce PT  10/17/2024

## 2024-10-17 NOTE — PT/OT/SLP EVAL
Occupational Therapy   Co-Evaluation  Co-treatment performed due to patient's multiple deficits requiring two skilled therapists to appropriately and safely assess patient's strength and endurance while facilitating functional tasks in addition to accommodating for patient's activity tolerance.      Name: Ina Blair  MRN: [id number]  Admitting Diagnosis: <principal problem not specified>  Recent Surgery: Procedure(s) (LRB):  FUSION, SPINE (N/A) 1 Day Post-Op    Recommendations:     Discharge Recommendations: Low Intensity Therapy  Discharge Equipment Recommendations:  bath bench  Barriers to discharge:  None    Assessment:     Ina Blair is a 53 y.o. female with a medical diagnosis of <principal problem not specified>.  She presents with the following performance deficits affecting function: impaired endurance, impaired self care skills, impaired functional mobility, gait instability, impaired balance, decreased coordination, decreased upper extremity function, decreased lower extremity function, decreased safety awareness, pain, orthopedic precautions.  Pt agreeable to therapy and tolerated well. LSO adjusted to pt and donned seated EOB. Education provided on spinal precautions with pt demonstrating good adherence throughout. Pt demonstrated increased distractibility and poor safety awareness requiring cueing throughout for safety with transfers. Pt would continue to benefit from skilled OT services to maximize functional independence with ADLs and functional mobility, reduce caregiver burden, and facilitate safe discharge in the least restrictive environment.       Patient continues to demonstrate the need for low intensity therapy on a scheduled basis exhibited by decreased independence with self-care and functional mobility     Rehab Prognosis: Good; patient would benefit from acute skilled OT services to address these deficits and reach maximum level of function.       Plan:     Patient to  be seen 4 x/week to address the above listed problems via self-care/home management, therapeutic activities, therapeutic exercises, neuromuscular re-education  Plan of Care Expires: 11/16/24  Plan of Care Reviewed with: patient    Subjective     Chief Complaint: LBP  Patient/Family Comments/goals: to d/c home    Occupational Profile:  Living Environment: Pt lives with spouse in a 2SH with 2 ADEN and no HR. Bed/bath on the 1st floor with t/s combo and shower chair (pt's mother borrowed her shower chair but plans to return per pt)  Previous level of function: Independent with ADLs and functional mobility using no AD  Roles and Routines: (+) drive; lives in [place]  Equipment Used at Home: walker, rolling, shower chair  Assistance upon Discharge: spouse    Pain/Comfort:  Pain Rating 1: 3/10  Location - Orientation 1: lower  Location 1: back  Pain Addressed 1: Pre-medicate for activity, Reposition, Distraction  Pain Rating Post-Intervention 1: 4/10    Patients cultural, spiritual, Sikhism conflicts given the current situation: no    Objective:   Additional staff present:  KATE Shin    Communicated with: RN prior to session.  Patient found HOB elevated with burnett catheter, hemovac, wound vac upon OT entry to room.    General Precautions: Standard, fall  Orthopedic Precautions: spinal precautions  Braces: LSO  Respiratory Status: Room air    Occupational Performance:    Bed Mobility:    Patient completed Supine to Sit with contact guard assistance    Functional Mobility/Transfers:  Patient completed Sit <> Stand Transfer with contact guard assistance  with  no assistive device   Patient completed Toilet Transfer Step Transfer technique with contact guard assistance with  no AD  Functional Mobility: Pt engaged in functional mobility to simulate household/community distances 150 ft with CGA and no AD in order to maximize functional endurance and standing balance required for engagement in occupations of choice   No  overt LOB, unsteadiness noted  No SOB    Activities of Daily Living:  Upper Body Dressing: stand by assistance to don hospital gown over back EOB  Lower Body Dressing: contact guard assistance for functional balance as pt donned hospital socks edge of bed using figure 4 technique     Cognitive/Visual Perceptual:  Cognitive/Psychosocial Skills:     -       Oriented to: Person, Place, Time, and Situation   -       Follows Commands/attention:Follows two-step commands  -       Safety awareness/insight to disability: impaired   -       Mood/Affect/Coping skills/emotional control: Cooperative    Physical Exam:  Sensation:    -       Intact  Upper Extremity Range of Motion:     -       Right Upper Extremity: WFL  -       Left Upper Extremity: WFL  Upper Extremity Strength:    -       Right Upper Extremity: WFL  -       Left Upper Extremity: WFL  Fine Motor Coordination:    -       Impaired  Left hand, manipulation of objects   and Right hand, manipulation of objects - min difficulty observed with donning socks    AMPAC 6 Click ADL:  AMPAC Total Score: 19    Treatment & Education:  -Education provided regarding fit and wear schedule of LSO, adjustments provided for comfort.  -Education provided regarding spinal precautions for use during functional tasks/mobility/transfers   -Education on energy conservation and task modification to maximize safety and (I) during ADLs and mobility  -Education on importance of OOB activity to improve overall activity tolerance and promote recovery  -Pt educated to call for assistance and to transfer with hospital staff only  -Provided education regarding role of OT, POC, & discharge recommendations with pt verbalizing understanding.  Pt had no further questions & when asked whether there were any concerns pt reported none.     Patient left up in chair with all lines intact, call button in reach, and RN notified    GOALS:   Multidisciplinary Problems       Occupational Therapy Goals           Problem: Occupational Therapy    Goal Priority Disciplines Outcome Interventions   Occupational Therapy Goal     OT, PT/OT Progressing    Description: Goals to be met by: 10/31/24     Patient will increase functional independence with ADLs by performing:    UE Dressing with Modified [place].  LE Dressing with Modified [place].  Grooming while standing at sink with Modified [place].  Toileting from toilet/bedside commode with Modified [place] for hygiene and clothing management.   Toilet transfer to toilet/bedside commode with Modified [place].                         History:     Past Medical History:   Diagnosis Date    Enlarged thyroid 2/20/2024    Headache     Hydrocephalus     Hypertension     Neuropathy     Unspecified osteoarthritis, unspecified site          Past Surgical History:   Procedure Laterality Date    APPENDECTOMY      bladder stretch      CARPAL TUNNEL RELEASE Bilateral     DECOMPRESSION OF CERVICAL SPINE BY POSTERIOR APPROACH N/A 3/20/2024    Procedure: DECOMPRESSION, SPINE, CERVICAL, POSTERIOR APPROACH;  Surgeon: Quentin Meek MD;  Location: [facility] OR [address] [place] [address];  Service: Neurosurgery;  Laterality: N/A;  CHAD patient  C3-6 PCF  Anesthesia: General  NM: EMg, SEP, MNEP  Rad: C-arm  Brace: Waldo J  Bed: [facility] slider  gel rolls  Bed position: head to core  headrest: [facility]    LAMINECTOMY AND MICRODISCECTOMY LUMBAR SPINE  2021    LUMBAR FUSION  2008    SPINAL FUSION N/A 10/16/2024    Procedure: FUSION, SPINE;  Surgeon: Quentin Meek MD;  Location: [address];  Service: Neurosurgery;  Laterality: N/A;  Revision of Fusion L3-L5    TOTAL HIP ARTHROPLASTY Left 10/31/2023    TOTAL KNEE ARTHROPLASTY Bilateral     VENTRICULOPERITONEAL SHUNT         Time Tracking:     OT Date of Treatment: 10/17/24  OT Start Time: 0749  OT Stop Time: 0817  OT Total Time (min): 28 min    Billable Minutes:Evaluation 15  Self Care/Home Management 13    10/17/2024

## 2024-10-17 NOTE — ASSESSMENT & PLAN NOTE
Patients blood pressure range in the last 24 hours was: BP  Min: 98/57  Max: 141/69.The patient's inpatient anti-hypertensive regimen is listed below:  Current Antihypertensives  losartan-hydrochlorothiazide 50-12.5 mg per tablet 1 tablet, Daily, Oral  hydrALAZINE injection 10 mg, Every 6 hours PRN, Intravenous    Plan  - BP is controlled, no changes needed to their regimen

## 2024-10-17 NOTE — HOSPITAL COURSE
10/17: JESUS. AFVSS. Voiding spontaneously s/p burnett removal, PVR 0. Pain well controlled. Ambulating. C/o chest pain/burning this morning, EKG and trop wnl. Tums and famotidine ordered. Home pending drain removal.  10/18: JESUS. HUONGVSS. Voiding. Labs pending. Narcan given this morning after pt became less responsive and hypoxic after dilaudid IV given. Drowsy but back to neuro baseline after. See rapid response note for details. Continue HV to gravity off suction. Likely discharge tomorrow.  10/19: Patient improved this AM, did well with physical therapy ambulated 140 feet. Drain and provena removed, stable for discharge. Strength in right foot moderately improved from preop, persistent slight EHL weakness.

## 2024-10-17 NOTE — TELEPHONE ENCOUNTER
"CHAD patient. Rounded on patient in hospital. Patient sitting up in bed eating lunch. Has LSO brace on, drain to back, and provena wound vac. Patient states "I'm feeling good, ready to go home". Pain under control, patient ambulating down hallways with PT, no discharge needs. DC plan to [facility] then Post op clear to travel appt next week. Patient asking if she can fly back monday. Will touch base with patient and MD team tomorrow.    Future Appointments   Date Time Provider Department Center   10/22/2024 11:30 AM Anastacia Ritchie, NP [facility] [id number] Tony Araiza, RN, CMSRN  RN Navigator  [facility]  Ph [phone]  Fax [phone]        "

## 2024-10-17 NOTE — PLAN OF CARE
[facility]  Initial Discharge Assessment       Primary Care Provider: No, Primary Doctor    Admission Diagnosis: DDD (degenerative disc disease), lumbar [M51.369]  Other spondylosis with radiculopathy, lumbar region [M47.26]  Spondylolisthesis of lumbar region [M43.16]    Admission Date: 10/16/2024  Expected Discharge Date: 10/18/2024         Payor: AETNA / Plan: AETNA CHOICE POS / Product Type: Commercial /     Extended Emergency Contact Information  Primary Emergency Contact: Chandan Almazan  Mobile Phone: [phone]  Relation: Spouse  Preferred language: English    Discharge Plan A: Home with family         [facility] [address] [place] [address] - [address] [facility]  [address] [facility] [address]  Phone: [phone] Fax: [phone]      Initial Assessment (most recent)       Adult Discharge Assessment - 10/17/24 1207          Discharge Assessment    Assessment Type Discharge Planning Assessment     Confirmed/corrected address, phone number and insurance Yes     Confirmed Demographics Correct on Facesheet     Source of Information patient     Does patient/caregiver understand observation status Yes     Communicated DEYANIRA with patient/caregiver Yes     People in Home spouse     Facility Arrived From: Home     Do you expect to return to your current living situation? Yes     Do you have help at home or someone to help you manage your care at home? Yes     Who are your caregiver(s) and their phone number(s)? Chandan Almazan (Spouse)     Prior to hospitilization cognitive status: Alert/Oriented     Current cognitive status: Alert/Oriented     Equipment Currently Used at Home walker, rolling     Readmission within 30 days? No     Patient currently being followed by outpatient case management? No     Do you currently have service(s) that help you manage your care at home? No     Do you take prescription medications? Yes     Do you have prescription coverage? Yes     Do you have any problems affording any of  your prescribed medications? No     Is the patient taking medications as prescribed? yes     Who is going to help you get home at discharge? Spouse-Chandan     How do you get to doctors appointments? family or friend will provide     Are you on dialysis? No     Do you take coumadin? No     Discharge Plan A Home with family        Physical Activity    On average, how many days per week do you engage in moderate to strenuous exercise (like a brisk walk)? 0 days     On average, how many minutes do you engage in exercise at this level? 0 min        Financial Resource Strain    How hard is it for you to pay for the very basics like food, housing, medical care, and heating? Not hard at all        Housing Stability    In the last 12 months, was there a time when you were not able to pay the mortgage or rent on time? No     At any time in the past 12 months, were you homeless or living in a shelter (including now)? No        Transportation Needs    Has the lack of transportation kept you from medical appointments, meetings, work or from getting things needed for daily living? No        Food Insecurity    Within the past 12 months, you worried that your food would run out before you got the money to buy more. Never true     Within the past 12 months, the food you bought just didn't last and you didn't have money to get more. Never true        Stress    Do you feel stress - tense, restless, nervous, or anxious, or unable to sleep at night because your mind is troubled all the time - these days? Not at all        Social Isolation    How often do you feel lonely or isolated from those around you?  Never        Alcohol Use    Q1: How often do you have a drink containing alcohol? Monthly or less     Q2: How many drinks containing alcohol do you have on a typical day when you are drinking? 1 or 2     Q3: How often do you have six or more drinks on one occasion? Never        LifeIMAGEities    In the past 12 months has the electric, gas,  oil, or water company threatened to shut off services in your home? No        Health Literacy    How often do you need to have someone help you when you read instructions, pamphlets, or other written material from your doctor or pharmacy? Never                      CHAD Patient. Spoke with patient and Best friend at bedside to complete d/c planning assessment. Patient lives with her spouse in a 2 story home with 2 steps to enter. Independent with Adl's. Home DME includes walker. Verified PCP, Pharmacy and health insurance. Patient to d/c to [facility] with her best friend on 10/18/24 if meeting post-op milestones. Discharge Plan A and Plan B have been determined by review of patient's clinical status, future medical and therapeutic needs, and coverage/benefits for post-acute care in coordination with multidisciplinary team members.        Jalyn PARSONS  Case Management  [facility]  [phone]

## 2024-10-17 NOTE — PROGRESS NOTES
[facility]  Neurosurgery  Progress Note    Subjective:     History of Present Illness: Ina Blair is a 53 y.o.  female with history of hydrocephalus status post shunt, Codman Hakim set at 100, prior L4-5 TLIF, and most recently PCF C3-6 with Dr. Meek on 3/20/24. She is being seen in clinic today for her pre-operative appointment for her revision and extension PSF L3-5 with Dr. Meek. States that she continues to struggle with low back and R>L leg pain that radiates down the lateral and posterior aspect of the legs. Back = leg pain. She feels as though her right foot continues to drag. Rates her pain as a 3/10.     Post-Op Info:  Procedure(s) (LRB):  FUSION, SPINE (N/A)   1 Day Post-Op   Interval History: NAEON. AFVSS. Voiding spontaneously s/p burnett removal, PVR 0. Pain well controlled. Ambulating. C/o chest pain/burning this morning, EKG and trop wnl. Tums and famotidine ordered. Home pending drain removal.    Medications:  Continuous Infusions:  Scheduled Meds:   acetaminophen  650 mg Oral Q6H    ceFAZolin (Ancef) IV (PEDS and ADULTS)  2 g Intravenous Q8H    DULoxetine  60 mg Oral Daily    enoxparin  40 mg Subcutaneous Q24H (prophylaxis, 1700)    famotidine  20 mg Oral BID    gabapentin  400 mg Oral TID    losartan-hydrochlorothiazide 50-12.5 mg  1 tablet Oral Daily    mupirocin   Nasal BID    polyethylene glycol  17 g Oral Daily    topiramate  25 mg Oral Daily     PRN Meds:  Current Facility-Administered Medications:     ALPRAZolam, 0.5 mg, Oral, BID PRN    aluminum-magnesium hydroxide-simethicone, 30 mL, Oral, Q4H PRN    bisacodyL, 10 mg, Rectal, Daily PRN    calcium carbonate, 500 mg, Oral, TID PRN    dextrose 10%, 12.5 g, Intravenous, PRN    dextrose 10%, 25 g, Intravenous, PRN    hydrALAZINE, 10 mg, Intravenous, Q6H PRN    HYDROmorphone, 2 mg, Intravenous, Q3H PRN    ondansetron, 8 mg, Oral, Q6H PRN    oxyCODONE, 15 mg, Oral, Q6H PRN    oxyCODONE, 10 mg, Oral, Q4H PRN     prochlorperazine, 5 mg, Intravenous, Q6H PRN    senna-docusate 8.6-50 mg, 2 tablet, Oral, Nightly PRN    tiZANidine, 4 mg, Oral, Q6H PRN     Review of Systems  Objective:     Weight: 99.8 kg (220 lb 0.3 oz)  Body mass index is 37.77 kg/m².  Vital Signs (Most Recent):  Temp: 97.8 °F (36.6 °C) (10/17/24 1542)  Pulse: 68 (10/17/24 1542)  Resp: 12 (10/17/24 1542)  BP: (!) 120/59 (10/17/24 1542)  SpO2: 98 % (10/17/24 1542) Vital Signs (24h Range):  Temp:  [97.5 °F (36.4 °C)-98.4 °F (36.9 °C)] 97.8 °F (36.6 °C)  Pulse:  [65-84] 68  Resp:  [12-18] 12  SpO2:  [93 %-98 %] 98 %  BP: (104-141)/(59-73) 120/59     Date 10/17/24 0700 - 10/18/24 0659   Shift 2643-7892 7850-6945 3555-4225 24 Hour Total   INTAKE   Shift Total(mL/kg)       OUTPUT   Urine(mL/kg/hr) 1350(1.7)   1350   Other 0   0   Shift Total(mL/kg) 1350(13.5)   1350(13.5)   Weight (kg) 99.8 99.8 99.8 99.8                            Closed/Suction Drain 10/16/24 1340 Right;Posterior Back Accordion 10 Fr. (Active)   Site Description Unable to view 10/17/24 0745   Dressing Type Central line dressing 10/17/24 0745   Dressing Status Dry;Clean;Intact 10/17/24 0745   Dressing Intervention Integrity maintained 10/17/24 0745   Drainage Bloody 10/17/24 0745   Status Other (Comment) 10/17/24 0745   Output (mL) 125 mL 10/17/24 0441          Physical Exam         Neurosurgery Physical Exam    General: well developed, well nourished, no distress.   Head: normocephalic, atraumatic  Neurologic: Alert and oriented. Thought content appropriate.  GCS: Motor: 6/Verbal: 5/Eyes: 4 GCS Total: 15  Mental Status: Awake, Alert, Oriented x 4  Language: No aphasia  Speech: No dysarthria  Cranial nerves: face symmetric, tongue midline, CN II-XII grossly intact, EOMI.   Pulmonary: normal respirations, no signs of respiratory distress  Sensory: intact to light touch throughout  Motor Strength: Moves all extremities spontaneously with good tone. No abnormal movements seen.     Strength  Deltoids  "Triceps Biceps Wrist Extension Wrist Flexion Hand    Upper: R 5/5 5/5 5/5 5/5 5/5 5/5    L 5/5 5/5 5/5 5/5 5/5 5/5     Iliopsoas Quadriceps Knee  Flexion Tibialis  anterior Gastro- cnemius EHL   Lower: R 4-/5 5/5 5/5 4+/5 5/5 4+/5    L 5/5 5/5 5/5 5/5 5/5 5/5     Skin: Skin is warm, dry and intact.    Incisional WV in place, good seal. HV to suction.        Significant Labs:  Recent Labs   Lab 10/17/24  1318   GLU 97   *   K 3.9      CO2 23   BUN 11   CREATININE 0.8   CALCIUM 8.9     Recent Labs   Lab 10/16/24  1029 10/17/24  1318   WBC 7.69 17.68*   HGB 13.7 11.5*   HCT 41.1 35.1*    381     No results for input(s): "LABPT", "INR", "APTT" in the last 48 hours.  Microbiology Results (last 7 days)       ** No results found for the last 168 hours. **          Recent Lab Results         10/17/24  1318   10/17/24  0830   10/17/24  0806        Anion Gap 9           Baso # 0.03           Basophil % 0.2           BUN 11           Calcium 8.9           Chloride 103           CO2 23           Creatinine 0.8           Differential Method Automated           eGFR >60.0           Eos # 0.0           Eos % 0.1           Glucose 97           Gran # (ANC) 13.6           Gran % 77.0           Hematocrit 35.1           Hemoglobin 11.5           Immature Grans (Abs) 0.09  Comment: Mild elevation in immature granulocytes is non specific and   can be seen in a variety of conditions including stress response,   acute inflammation, trauma and pregnancy. Correlation with other   laboratory and clinical findings is essential.             Immature Granulocytes 0.5           Lymph # 2.4           Lymph % 13.5           MCH 29.8           MCHC 32.8           MCV 91           Mono # 1.5           Mono % 8.7           MPV 9.6           nRBC 0           QRS Duration   104         O[facility] QTC Calculation   429         Platelet Count 381           Potassium 3.9           RBC 3.86           RDW 13.5           Sodium 135           " Troponin I     <0.006  Comment: The reference interval for Troponin I represents the 99th percentile   cutoff   for our facility and is consistent with 3rd generation assay   performance.         WBC 17.68                 All pertinent labs from the last 24 hours have been reviewed.    Significant Diagnostics:  I have reviewed all pertinent imaging results/findings within the past 24 hours.  Assessment/Plan:     * Fusion of spine, lumbar region  Ina Blair is a 53 y.o. female with history of hydrocephalus status post shunt, Codman Hakim set at 100, prior L4-5 TLIF, and most recently PCF C3-6 with Dr. Meek on 3/20/24. Now s/p revision and extension PSF L3-5 with Dr. Meek on 10/16.    - Neurologically stable  - Pain control: continue current regimen  - WV to POD5  - Drains: HV to gravity off suction. Abx while drain in place.  - Post op imaging showing satisfactory placement of hardware.   - Brace: LSO when OOB  - Continue PT/OT/OOB. OOB > 6hrs/day  - GI: Diet as tolerated. Continue bowel regimen.   - Voiding spontaneously s/p burnett removal, PVR 0. Bladder scan prn.  - DVT ppx: LVX, Compression stockings, SCDs  - Atelectasis ppx: IS at bedside. Encourage use every hour while awake.  - Discussed with Dr. Meek    - Dispo: Pending drain removal    Primary hypertension  Patients blood pressure range in the last 24 hours was: BP  Min: 98/57  Max: 141/69.The patient's inpatient anti-hypertensive regimen is listed below:  Current Antihypertensives  losartan-hydrochlorothiazide 50-12.5 mg per tablet 1 tablet, Daily, Oral  hydrALAZINE injection 10 mg, Every 6 hours PRN, Intravenous    Plan  - BP is controlled, no changes needed to their regimen        Jossy Tracy PA-C  Neurosurgery  [facility] - Surgery

## 2024-10-17 NOTE — PT/OT/SLP EVAL
"Physical Therapy Evaluation/co eval with OT    Patient Name:  Ina Blair   MRN:  [id number]    Recommendations:     Discharge Recommendations: Low Intensity Therapy   Discharge Equipment Recommendations: bath bench   Barriers to discharge: Inaccessible home 2 ADEN with no rail    Assessment:     Ina Blair is a 53 y.o. female admitted with a medical diagnosis of DDD.  She presents with the following impairments/functional limitations: weakness, gait instability, impaired balance, impaired functional mobility, pain, decreased lower extremity function, orthopedic precautions . Pt requires CGA for bed mobility, CGA for transfers, minimal assist for up/down steps, and CGA for gait due to decreased clearance of R foot during swing phase, hip circumduction to advance her R LE, and mild instability. Pt is motivated to progress with functional mobility.     Rehab Prognosis: Good; patient would benefit from acute skilled PT services to address these deficits and reach maximum level of function.    Recent Surgery: Procedure(s) (LRB):  FUSION, SPINE (N/A) 1 Day Post-Op    Plan:     During this hospitalization, patient to be seen 4 x/week to address the identified rehab impairments via gait training, therapeutic activities, therapeutic exercises, neuromuscular re-education and progress toward the following goals:    Plan of Care Expires:  11/16/24    Subjective   "I am ready to get out of this bed"    Pain/Comfort:  Pain Rating 1: 3/10  Location - Orientation 1: lower  Location 1: back  Pain Addressed 1: Reposition, Cessation of Activity, Pre-medicate for activity  Pain Rating Post-Intervention 1: 4/10    Patients cultural, spiritual, Faith conflicts given the current situation: no    Living Environment:  Pt lives with her  in [place] in a 2 story home with 2 ADEN with no rail; pt's bed/bathroom on the 1st floor  Prior to admission, patients level of function was independent/drives.  Equipment " used at home: walker, rolling (pt has access to a shower chair).  Upon discharge, patient will have assistance from .    Objective:     Communicated with nurse prior to session.  Patient found HOB elevated with burnett catheter, hemovac, wound vac  upon PT entry to room.    General Precautions: Standard, fall  Orthopedic Precautions:spinal precautions   Braces: LSO (LSO when OOB)  Respiratory Status: Room air    Exams:  Cognitive Exam:  Patient is oriented to Person, Place, and Time  Sensation:    -       Intact  light/touch B LE  RLE ROM: WFL  RLE Strength: WFL except hip flex 3+/5; ankle DF 4-/5  LLE ROM: WFL  LLE Strength: WFL except hip flex 4-/5    Functional Mobility:  Bed Mobility:     Rolling Right: contact guard assistance  Supine to Sit: contact guard assistance  Transfers:     Sit to Stand:  contact guard assistance with no AD  Gait: 150ft with no AD with CGA. Pt performed gait with R hip circumduction to clear her R foot and decreased clearance of R foot during swing phase. Pt required verbal cues for proper technique and to slow pace for safety  Stairs:  Pt ascended/descended 2 stair(s) with HHA with no handrails with Minimal Assistance.     AM-PAC 6 CLICK MOBILITY  Total Score:18     Treatment & Education:  Pt educated in spinal prec and safety with mobility upon D/C. She expressed understanding.  This session was done as a co treatment with OT to advance pt's mobility to progress towards discharge.   Patient left up in chair with all lines intact, call button in reach, and nurse notified.    GOALS:   Multidisciplinary Problems       Physical Therapy Goals          Problem: Physical Therapy    Goal Priority Disciplines Outcome Interventions   Physical Therapy Goal     PT, PT/OT Progressing    Description: PT goals until 10/24/24    1. Pt supine to sit with mod independent-not met  2. Pt sit to supine with mod independent-not met  3. Pt sit to stand with no AD with supervision-not met  4. Pt to  perform gait 200ft with no AD with supervision.-not met  5. Pt to up/down 2 steps with no rail with HHA with CGA.-not met                       History:     Past Medical History:   Diagnosis Date    Enlarged thyroid 2/20/2024    Headache     Hydrocephalus     Hypertension     Neuropathy     Unspecified osteoarthritis, unspecified site        Past Surgical History:   Procedure Laterality Date    APPENDECTOMY      bladder stretch      CARPAL TUNNEL RELEASE Bilateral     DECOMPRESSION OF CERVICAL SPINE BY POSTERIOR APPROACH N/A 3/20/2024    Procedure: DECOMPRESSION, SPINE, CERVICAL, POSTERIOR APPROACH;  Surgeon: Quentin Meek MD;  Location: [facility] OR [address] [place] [address];  Service: Neurosurgery;  Laterality: N/A;  CHAD patient  C3-6 PCF  Anesthesia: General  NM: EMg, SEP, MNEP  Rad: C-arm  Brace: [place] J  Bed: [facility] slider w gel rolls  Bed position: head to core  [facility]    LAMINECTOMY AND MICRODISCECTOMY LUMBAR SPINE  2021    LUMBAR FUSION  2008    SPINAL FUSION N/A 10/16/2024    Procedure: FUSION, SPINE;  Surgeon: Quentin Meek MD;  Location: [facility] OR [address] [place] [address];  Service: Neurosurgery;  Laterality: N/A;  Revision of Fusion L3-L5    TOTAL HIP ARTHROPLASTY Left 10/31/2023    TOTAL KNEE ARTHROPLASTY Bilateral     VENTRICULOPERITONEAL SHUNT         Time Tracking:     PT Received On: 10/17/24  PT Start Time: 0751     PT Stop Time: 0817  PT Total Time (min): 26 min     Billable Minutes: Evaluation 16 and Gait Training 10      10/17/2024

## 2024-10-17 NOTE — SUBJECTIVE & OBJECTIVE
Interval History: NAEON. AFVSS. Voiding spontaneously s/p burnett removal, PVR 0. Pain well controlled. Ambulating. C/o chest pain/burning this morning, EKG and trop wnl. Tums and famotidine ordered. Home pending drain removal.    Medications:  Continuous Infusions:  Scheduled Meds:   acetaminophen  650 mg Oral Q6H    ceFAZolin (Ancef) IV (PEDS and ADULTS)  2 g Intravenous Q8H    DULoxetine  60 mg Oral Daily    enoxparin  40 mg Subcutaneous Q24H (prophylaxis, 1700)    famotidine  20 mg Oral BID    gabapentin  400 mg Oral TID    losartan-hydrochlorothiazide 50-12.5 mg  1 tablet Oral Daily    mupirocin   Nasal BID    polyethylene glycol  17 g Oral Daily    topiramate  25 mg Oral Daily     PRN Meds:  Current Facility-Administered Medications:     ALPRAZolam, 0.5 mg, Oral, BID PRN    aluminum-magnesium hydroxide-simethicone, 30 mL, Oral, Q4H PRN    bisacodyL, 10 mg, Rectal, Daily PRN    calcium carbonate, 500 mg, Oral, TID PRN    dextrose 10%, 12.5 g, Intravenous, PRN    dextrose 10%, 25 g, Intravenous, PRN    hydrALAZINE, 10 mg, Intravenous, Q6H PRN    HYDROmorphone, 2 mg, Intravenous, Q3H PRN    ondansetron, 8 mg, Oral, Q6H PRN    oxyCODONE, 15 mg, Oral, Q6H PRN    oxyCODONE, 10 mg, Oral, Q4H PRN    prochlorperazine, 5 mg, Intravenous, Q6H PRN    senna-docusate 8.6-50 mg, 2 tablet, Oral, Nightly PRN    tiZANidine, 4 mg, Oral, Q6H PRN     Review of Systems  Objective:     Weight: 99.8 kg (220 lb 0.3 oz)  Body mass index is 37.77 kg/m².  Vital Signs (Most Recent):  Temp: 97.8 °F (36.6 °C) (10/17/24 1542)  Pulse: 68 (10/17/24 1542)  Resp: 12 (10/17/24 1542)  BP: (!) 120/59 (10/17/24 1542)  SpO2: 98 % (10/17/24 1542) Vital Signs (24h Range):  Temp:  [97.5 °F (36.4 °C)-98.4 °F (36.9 °C)] 97.8 °F (36.6 °C)  Pulse:  [65-84] 68  Resp:  [12-18] 12  SpO2:  [93 %-98 %] 98 %  BP: (104-141)/(59-73) 120/59     Date 10/17/24 0700 - 10/18/24 0659   Shift 1797-3657 2350-6319 4676-7225 24 Hour Total   INTAKE   Shift Total(mL/kg)      "  OUTPUT   Urine(mL/kg/hr) 1350(1.7)   1350   Other 0   0   Shift Total(mL/kg) 1350(13.5)   1350(13.5)   Weight (kg) 99.8 99.8 99.8 99.8                            Closed/Suction Drain 10/16/24 1340 Right;Posterior Back Accordion 10 Fr. (Active)   Site Description Unable to view 10/17/24 0745   Dressing Type Central line dressing 10/17/24 0745   Dressing Status Dry;Clean;Intact 10/17/24 0745   Dressing Intervention Integrity maintained 10/17/24 0745   Drainage Bloody 10/17/24 0745   Status Other (Comment) 10/17/24 0745   Output (mL) 125 mL 10/17/24 0441          Physical Exam         Neurosurgery Physical Exam    General: well developed, well nourished, no distress.   Head: normocephalic, atraumatic  Neurologic: Alert and oriented. Thought content appropriate.  GCS: Motor: 6/Verbal: 5/Eyes: 4 GCS Total: 15  Mental Status: Awake, Alert, Oriented x 4  Language: No aphasia  Speech: No dysarthria  Cranial nerves: face symmetric, tongue midline, CN II-XII grossly intact, EOMI.   Pulmonary: normal respirations, no signs of respiratory distress  Sensory: intact to light touch throughout  Motor Strength: Moves all extremities spontaneously with good tone. No abnormal movements seen.     Strength  Deltoids Triceps Biceps Wrist Extension Wrist Flexion Hand    Upper: R 5/5 5/5 5/5 5/5 5/5 5/5    L 5/5 5/5 5/5 5/5 5/5 5/5     Iliopsoas Quadriceps Knee  Flexion Tibialis  anterior Gastro- cnemius EHL   Lower: R 4-/5 5/5 5/5 4+/5 5/5 4+/5    L 5/5 5/5 5/5 5/5 5/5 5/5     Skin: Skin is warm, dry and intact.    Incisional WV in place, good seal. HV to suction.        Significant Labs:  Recent Labs   Lab 10/17/24  1318   GLU 97   *   K 3.9      CO2 23   BUN 11   CREATININE 0.8   CALCIUM 8.9     Recent Labs   Lab 10/16/24  1029 10/17/24  1318   WBC 7.69 17.68*   HGB 13.7 11.5*   HCT 41.1 35.1*    381     No results for input(s): "LABPT", "INR", "APTT" in the last 48 hours.  Microbiology Results (last 7 days)  "      ** No results found for the last 168 hours. **          Recent Lab Results         10/17/24  1318   10/17/24  0830   10/17/24  0806        Anion Gap 9           Baso # 0.03           Basophil % 0.2           BUN 11           Calcium 8.9           Chloride 103           CO2 23           Creatinine 0.8           Differential Method Automated           eGFR >60.0           Eos # 0.0           Eos % 0.1           Glucose 97           Gran # (ANC) 13.6           Gran % 77.0           Hematocrit 35.1           Hemoglobin 11.5           Immature Grans (Abs) 0.09  Comment: Mild elevation in immature granulocytes is non specific and   can be seen in a variety of conditions including stress response,   acute inflammation, trauma and pregnancy. Correlation with other   laboratory and clinical findings is essential.             Immature Granulocytes 0.5           Lymph # 2.4           Lymph % 13.5           MCH 29.8           MCHC 32.8           MCV 91           Mono # 1.5           Mono % 8.7           MPV 9.6           nRBC 0           QRS Duration   104         OHS QTC Calculation   429         Platelet Count 381           Potassium 3.9           RBC 3.86           RDW 13.5           Sodium 135           Troponin I     <0.006  Comment: The reference interval for Troponin I represents the 99th percentile   cutoff   for our facility and is consistent with 3rd generation assay   performance.         WBC 17.68                 All pertinent labs from the last 24 hours have been reviewed.    Significant Diagnostics:  I have reviewed all pertinent imaging results/findings within the past 24 hours.

## 2024-10-18 ENCOUNTER — TELEPHONE (OUTPATIENT)
Dept: NEUROSURGERY | Facility: CLINIC | Age: 54
End: 2024-10-18
Payer: COMMERCIAL

## 2024-10-18 LAB
ALBUMIN SERPL BCP-MCNC: 3.9 G/DL (ref 3.5–5.2)
ALP SERPL-CCNC: 64 U/L (ref 40–150)
ALT SERPL W/O P-5'-P-CCNC: 15 U/L (ref 10–44)
ANION GAP SERPL CALC-SCNC: 8 MMOL/L (ref 8–16)
AST SERPL-CCNC: 19 U/L (ref 10–40)
BASOPHILS # BLD AUTO: 0.06 K/UL (ref 0–0.2)
BASOPHILS NFR BLD: 0.4 % (ref 0–1.9)
BILIRUB SERPL-MCNC: 0.3 MG/DL (ref 0.1–1)
BUN SERPL-MCNC: 16 MG/DL (ref 6–20)
CALCIUM SERPL-MCNC: 9 MG/DL (ref 8.7–10.5)
CHLORIDE SERPL-SCNC: 101 MMOL/L (ref 95–110)
CO2 SERPL-SCNC: 26 MMOL/L (ref 23–29)
CREAT SERPL-MCNC: 0.9 MG/DL (ref 0.5–1.4)
DIFFERENTIAL METHOD BLD: ABNORMAL
EOSINOPHIL # BLD AUTO: 0.1 K/UL (ref 0–0.5)
EOSINOPHIL NFR BLD: 0.3 % (ref 0–8)
ERYTHROCYTE [DISTWIDTH] IN BLOOD BY AUTOMATED COUNT: 13.4 % (ref 11.5–14.5)
EST. GFR  (NO RACE VARIABLE): >60 ML/MIN/1.73 M^2
GLUCOSE SERPL-MCNC: 123 MG/DL (ref 70–110)
HCT VFR BLD AUTO: 34.4 % (ref 37–48.5)
HGB BLD-MCNC: 11.2 G/DL (ref 12–16)
IMM GRANULOCYTES # BLD AUTO: 0.1 K/UL (ref 0–0.04)
IMM GRANULOCYTES NFR BLD AUTO: 0.7 % (ref 0–0.5)
LYMPHOCYTES # BLD AUTO: 3.6 K/UL (ref 1–4.8)
LYMPHOCYTES NFR BLD: 23.5 % (ref 18–48)
MCH RBC QN AUTO: 30.1 PG (ref 27–31)
MCHC RBC AUTO-ENTMCNC: 32.6 G/DL (ref 32–36)
MCV RBC AUTO: 93 FL (ref 82–98)
MONOCYTES # BLD AUTO: 1.5 K/UL (ref 0.3–1)
MONOCYTES NFR BLD: 9.6 % (ref 4–15)
NEUTROPHILS # BLD AUTO: 10 K/UL (ref 1.8–7.7)
NEUTROPHILS NFR BLD: 65.5 % (ref 38–73)
NRBC BLD-RTO: 0 /100 WBC
PLATELET # BLD AUTO: 344 K/UL (ref 150–450)
PMV BLD AUTO: 9.6 FL (ref 9.2–12.9)
POTASSIUM SERPL-SCNC: 3.7 MMOL/L (ref 3.5–5.1)
PROT SERPL-MCNC: 6.8 G/DL (ref 6–8.4)
RBC # BLD AUTO: 3.72 M/UL (ref 4–5.4)
SODIUM SERPL-SCNC: 135 MMOL/L (ref 136–145)
WBC # BLD AUTO: 15.29 K/UL (ref 3.9–12.7)

## 2024-10-18 PROCEDURE — 85025 COMPLETE CBC W/AUTO DIFF WBC: CPT | Performed by: PHYSICIAN ASSISTANT

## 2024-10-18 PROCEDURE — 63600175 PHARM REV CODE 636 W HCPCS

## 2024-10-18 PROCEDURE — 63600175 PHARM REV CODE 636 W HCPCS: Performed by: NEUROLOGICAL SURGERY

## 2024-10-18 PROCEDURE — 99024 POSTOP FOLLOW-UP VISIT: CPT | Mod: COE,,, | Performed by: PHYSICIAN ASSISTANT

## 2024-10-18 PROCEDURE — 97116 GAIT TRAINING THERAPY: CPT

## 2024-10-18 PROCEDURE — 97530 THERAPEUTIC ACTIVITIES: CPT

## 2024-10-18 PROCEDURE — 63600175 PHARM REV CODE 636 W HCPCS: Performed by: PHYSICIAN ASSISTANT

## 2024-10-18 PROCEDURE — 25000003 PHARM REV CODE 250

## 2024-10-18 PROCEDURE — 80053 COMPREHEN METABOLIC PANEL: CPT | Performed by: PHYSICIAN ASSISTANT

## 2024-10-18 PROCEDURE — 25000003 PHARM REV CODE 250: Performed by: PHYSICIAN ASSISTANT

## 2024-10-18 PROCEDURE — 36415 COLL VENOUS BLD VENIPUNCTURE: CPT | Performed by: PHYSICIAN ASSISTANT

## 2024-10-18 PROCEDURE — 21400001 HC TELEMETRY ROOM

## 2024-10-18 PROCEDURE — 63600175 PHARM REV CODE 636 W HCPCS: Performed by: NURSE PRACTITIONER

## 2024-10-18 RX ORDER — NALOXONE HCL 0.4 MG/ML
VIAL (ML) INJECTION
Status: COMPLETED
Start: 2024-10-18 | End: 2024-10-18

## 2024-10-18 RX ORDER — OXYCODONE HYDROCHLORIDE 10 MG/1
10 TABLET ORAL EVERY 4 HOURS PRN
Status: DISCONTINUED | OUTPATIENT
Start: 2024-10-18 | End: 2024-10-18

## 2024-10-18 RX ORDER — ONDANSETRON 8 MG/1
8 TABLET, ORALLY DISINTEGRATING ORAL EVERY 6 HOURS PRN
Qty: 20 TABLET | Refills: 0 | Status: SHIPPED | OUTPATIENT
Start: 2024-10-18

## 2024-10-18 RX ORDER — AMOXICILLIN 250 MG
2 CAPSULE ORAL DAILY
COMMUNITY
Start: 2024-10-18

## 2024-10-18 RX ORDER — OXYCODONE HYDROCHLORIDE 10 MG/1
10 TABLET ORAL
Qty: 90 TABLET | Refills: 0 | Status: SHIPPED | OUTPATIENT
Start: 2024-10-18 | End: 2024-10-18 | Stop reason: HOSPADM

## 2024-10-18 RX ORDER — POLYETHYLENE GLYCOL 3350 17 G/17G
17 POWDER, FOR SOLUTION ORAL DAILY
COMMUNITY
Start: 2024-10-19

## 2024-10-18 RX ORDER — AMOXICILLIN 250 MG
2 CAPSULE ORAL DAILY
Status: DISCONTINUED | OUTPATIENT
Start: 2024-10-18 | End: 2024-10-19

## 2024-10-18 RX ORDER — OXYCODONE AND ACETAMINOPHEN 10; 325 MG/1; MG/1
1 TABLET ORAL EVERY 4 HOURS PRN
Qty: 84 TABLET | Refills: 0 | Status: SHIPPED | OUTPATIENT
Start: 2024-10-18

## 2024-10-18 RX ORDER — TIZANIDINE 4 MG/1
4 TABLET ORAL EVERY 6 HOURS PRN
Qty: 56 TABLET | Refills: 0 | Status: SHIPPED | OUTPATIENT
Start: 2024-10-18

## 2024-10-18 RX ORDER — NALOXONE HCL 0.4 MG/ML
0.4 VIAL (ML) INJECTION ONCE
Status: COMPLETED | OUTPATIENT
Start: 2024-10-18 | End: 2024-10-18

## 2024-10-18 RX ORDER — NALOXONE HYDROCHLORIDE 4 MG/.1ML
1 SPRAY NASAL ONCE
Qty: 2 EACH | Refills: 0 | Status: SHIPPED | OUTPATIENT
Start: 2024-10-18 | End: 2024-10-20

## 2024-10-18 RX ORDER — FAMOTIDINE 20 MG/1
20 TABLET, FILM COATED ORAL 2 TIMES DAILY
Qty: 28 TABLET | Refills: 0 | Status: SHIPPED | OUTPATIENT
Start: 2024-10-18 | End: 2024-11-02

## 2024-10-18 RX ORDER — OXYCODONE HYDROCHLORIDE 5 MG/1
5 TABLET ORAL EVERY 6 HOURS PRN
Status: DISCONTINUED | OUTPATIENT
Start: 2024-10-18 | End: 2024-10-19 | Stop reason: HOSPADM

## 2024-10-18 RX ORDER — OXYCODONE HYDROCHLORIDE 5 MG/1
5 TABLET ORAL EVERY 4 HOURS PRN
Status: DISCONTINUED | OUTPATIENT
Start: 2024-10-18 | End: 2024-10-18

## 2024-10-18 RX ADMIN — GABAPENTIN 400 MG: 400 CAPSULE ORAL at 08:10

## 2024-10-18 RX ADMIN — TIZANIDINE 4 MG: 4 TABLET ORAL at 02:10

## 2024-10-18 RX ADMIN — NALXONE HYDROCHLORIDE 0.4 MG: 0.4 INJECTION INTRAMUSCULAR; INTRAVENOUS; SUBCUTANEOUS at 09:10

## 2024-10-18 RX ADMIN — CEFAZOLIN 2 G: 2 INJECTION, POWDER, FOR SOLUTION INTRAMUSCULAR; INTRAVENOUS at 11:10

## 2024-10-18 RX ADMIN — FAMOTIDINE 20 MG: 20 TABLET ORAL at 09:10

## 2024-10-18 RX ADMIN — GABAPENTIN 400 MG: 400 CAPSULE ORAL at 03:10

## 2024-10-18 RX ADMIN — CEFAZOLIN 2 G: 2 INJECTION, POWDER, FOR SOLUTION INTRAMUSCULAR; INTRAVENOUS at 09:10

## 2024-10-18 RX ADMIN — ACETAMINOPHEN 650 MG: 325 TABLET ORAL at 05:10

## 2024-10-18 RX ADMIN — ENOXAPARIN SODIUM 40 MG: 40 INJECTION SUBCUTANEOUS at 05:10

## 2024-10-18 RX ADMIN — ACETAMINOPHEN 650 MG: 325 TABLET ORAL at 11:10

## 2024-10-18 RX ADMIN — CEFAZOLIN 2 G: 2 INJECTION, POWDER, FOR SOLUTION INTRAMUSCULAR; INTRAVENOUS at 04:10

## 2024-10-18 RX ADMIN — POLYETHYLENE GLYCOL 3350 17 G: 17 POWDER, FOR SOLUTION ORAL at 08:10

## 2024-10-18 RX ADMIN — FAMOTIDINE 20 MG: 20 TABLET ORAL at 08:10

## 2024-10-18 RX ADMIN — MUPIROCIN: 20 OINTMENT TOPICAL at 09:10

## 2024-10-18 RX ADMIN — HYDROMORPHONE HYDROCHLORIDE 2 MG: 1 INJECTION, SOLUTION INTRAMUSCULAR; INTRAVENOUS; SUBCUTANEOUS at 12:10

## 2024-10-18 RX ADMIN — SENNOSIDES AND DOCUSATE SODIUM 2 TABLET: 50; 8.6 TABLET ORAL at 11:10

## 2024-10-18 RX ADMIN — MUPIROCIN: 20 OINTMENT TOPICAL at 08:10

## 2024-10-18 RX ADMIN — TOPIRAMATE 25 MG: 25 TABLET, FILM COATED ORAL at 08:10

## 2024-10-18 RX ADMIN — HYDROMORPHONE HYDROCHLORIDE 2 MG: 1 INJECTION, SOLUTION INTRAMUSCULAR; INTRAVENOUS; SUBCUTANEOUS at 08:10

## 2024-10-18 RX ADMIN — LOSARTAN POTASSIUM AND HYDROCHLOROTHIAZIDE 1 TABLET: 50; 12.5 TABLET, FILM COATED ORAL at 08:10

## 2024-10-18 RX ADMIN — NALOXONE HYDROCHLORIDE 0.4 MG: 0.4 INJECTION, SOLUTION INTRAMUSCULAR; INTRAVENOUS; SUBCUTANEOUS at 11:10

## 2024-10-18 RX ADMIN — ALPRAZOLAM 0.5 MG: 0.5 TABLET ORAL at 11:10

## 2024-10-18 RX ADMIN — DULOXETINE HYDROCHLORIDE 60 MG: 60 CAPSULE, DELAYED RELEASE ORAL at 08:10

## 2024-10-18 RX ADMIN — OXYCODONE HYDROCHLORIDE 15 MG: 10 TABLET ORAL at 03:10

## 2024-10-18 RX ADMIN — GABAPENTIN 400 MG: 400 CAPSULE ORAL at 09:10

## 2024-10-18 RX ADMIN — OXYCODONE 5 MG: 5 TABLET ORAL at 09:10

## 2024-10-18 NOTE — PT/OT/SLP PROGRESS
Occupational Therapy      Patient Name:  Ina Blair   MRN:  [id number]    Patient not seen today secondary to Nurse/ MILENA hold. Will follow-up at earliest convenience.    10/18/2024

## 2024-10-18 NOTE — PLAN OF CARE
Problem: Physical Therapy  Goal: Physical Therapy Goal  Description: PT goals until 10/24/24    1. Pt supine to sit with mod independent-not met  2. Pt sit to supine with mod independent-not met  3. Pt sit to stand with no AD with supervision-not met  4. Pt to perform gait 200ft with no AD with supervision.-not met  5. Pt to up/down 2 steps with no rail with HHA with CGA.-not met  10/18/2024 1625 by Aleida Arce, PT  Outcome: Progressing  10/18/2024 1625 by Aleida Arce, PT  Outcome: Progressing   Pt's goals remain appropriate and pt will continue to benefit from skilled PT services to work towards improved functional mobility including: bed mobility, transfers, up/down steps, and gait. Pt was lethargic and was unsafe when mobility was attempted. Nurse called to assess and PT unable to perform further mobility. Aleida Arce PT  10/18/2024

## 2024-10-18 NOTE — DISCHARGE INSTRUCTIONS
Post op Spine Patient Instructions    Activity Restrictions:  [x]  Return to work will be determined on an individual basis.  [x]  No lifting greater than 5-10 pounds.  [x]  Avoid bending and twisting the area of your surgery more than 45 degrees from neutral position in any direction.  [x]  No driving or operating machinery:  [x]  until cleared by your surgeon.  [x]  while taking narcotic pain medications or muscle relaxants.  [x]  Wear your brace at all times except when lying in bed or showering.   [x]  Increase ambulation over the next 2 weeks. Walk on paved surfaces only. It is okay to walk up and down stairs while holding onto a side rail.  [x]  No sexual activity for 2 weeks.    Discharge Medication/Follow-up:  [x]  Please refer to discharge medication reconciliation form.  [x]  Take Tylenol (acetaminophen) 650 mg every 6 hours as needed for additional pain control.  [x]  Do not take ANY Aspirin or Aspirin containing products for 2 weeks after surgery (unless otherwise directed in discharge medication list).   [x]  Do not take ANY herbal supplements for 2 weeks after surgery.    [x]  Do not take ANY non-steroidal anti-inflammatory drugs (NSAIDS), including the following: ibuprofen, naprosyn, Aleve, Advil, Indocin, Mobic, or Celebrex for ***12 weeks after surgery.   [x]  Prescriptions for appropriate medication will be given upon discharge.  [x]  Take the pain medication as prescribed. We recommend to wean use of your pain medication after 1 week of taking as prescribed (Ex: After 1 week of taking every 4 hours as needed, wean down to taking your pain medication every 6 hours as needed).  [x]  Take docusate (Colace 100 mg): take one capsule a day as needed for constipation. You can get this over the counter.  [x]  Follow-up appointment:  [x]  10-14 days post-op for wound check by physician assistant/nurse  [x]  4-6 weeks with MD:  [x]  with x-rays  [x]  An appointment will be mailed to you.    Wound Care:  [x]   Remove the small dressing near your incision in 5 days.   [x]  No bandage required. Keep your incision open to the air.   [x]  You may shower on the 2nd day after your surgery. Do not allow the force of water to hit the incision. Ok to allow water to rinse over the incision. Pat the incision dry if it becomes wet. Do not rub or scrub the incision.  [x]  You cannot take a bath until 8 weeks after surgery.  [x]  Apply bacitracin to incision twice a day for 2 weeks.    Call your doctor or go to the Emergency Room for any signs of infection, including: increased redness, drainage, pain, or fever (temperature >=101). Call your doctor or go to the Emergency Room if there are any localized neurological changes; problems with speech, vision, numbness, tingling, weakness, or severe headache; inability to control urination or bowel movements; inability to urinate; or for other concerns.      Special Instructions:  [x]  No use of tobacco products.  [x]  Diet: Please eat a regular diet as tolerated.      Physicians need 3 days' notice for pain medicine to be refilled. Pain medicine will only be refilled between 8 AM and 5 PM, Monday through Friday, due to Food and Drug Administration regulation of documentation.    If you have any questions about this form, please call [phone].    Form No. 24434 (Revised 10/31/2013)

## 2024-10-18 NOTE — PROGRESS NOTES
[facility]  Neurosurgery  Progress Note    Subjective:     History of Present Illness: Ina Blair is a 53 y.o.  female with history of hydrocephalus status post shunt, Codman Hakim set at 100, prior L4-5 TLIF, and most recently PCF C3-6 with Dr. Meek on 3/20/24. She is being seen in clinic today for her pre-operative appointment for her revision and extension PSF L3-5 with Dr. Meek. States that she continues to struggle with low back and R>L leg pain that radiates down the lateral and posterior aspect of the legs. Back = leg pain. She feels as though her right foot continues to drag. Rates her pain as a 3/10.     Post-Op Info:  Procedure(s) (LRB):  FUSION, SPINE (N/A)   2 Days Post-Op   Interval History: NAEON. AFVSS. Voiding. Labs pending. Narcan given this morning after pt became less responsive and hypoxic after dilaudid IV given. Drowsy but back to neuro baseline after. See rapid response note for details. Continue HV to gravity off suction. Likely discharge tomorrow.    Medications:  Continuous Infusions:  Scheduled Meds:   acetaminophen  650 mg Oral Q6H    ceFAZolin (Ancef) IV (PEDS and ADULTS)  2 g Intravenous Q8H    DULoxetine  60 mg Oral Daily    enoxparin  40 mg Subcutaneous Q24H (prophylaxis, 1700)    famotidine  20 mg Oral BID    gabapentin  400 mg Oral TID    losartan-hydrochlorothiazide 50-12.5 mg  1 tablet Oral Daily    mupirocin   Nasal BID    naloxone  0.4 mg Intravenous Once    polyethylene glycol  17 g Oral Daily    senna-docusate 8.6-50 mg  2 tablet Oral Daily    topiramate  25 mg Oral Daily     PRN Meds:  Current Facility-Administered Medications:     ALPRAZolam, 0.5 mg, Oral, BID PRN    aluminum-magnesium hydroxide-simethicone, 30 mL, Oral, Q4H PRN    bisacodyL, 10 mg, Rectal, Daily PRN    calcium carbonate, 500 mg, Oral, TID PRN    dextrose 10%, 12.5 g, Intravenous, PRN    dextrose 10%, 25 g, Intravenous, PRN    hydrALAZINE, 10 mg, Intravenous, Q6H PRN    ondansetron,  8 mg, Oral, Q6H PRN    oxyCODONE, 5 mg, Oral, Q6H PRN    prochlorperazine, 5 mg, Intravenous, Q6H PRN    tiZANidine, 4 mg, Oral, Q6H PRN     Review of Systems  Objective:     Weight: 99.8 kg (220 lb 0.3 oz)  Body mass index is 37.77 kg/m².  Vital Signs (Most Recent):  Temp: 98.4 °F (36.9 °C) (10/18/24 1112)  Pulse: 74 (10/18/24 1112)  Resp: 17 (10/18/24 1112)  BP: 125/66 (10/18/24 1112)  SpO2: (!) 94 % (10/18/24 1112) Vital Signs (24h Range):  Temp:  [97.6 °F (36.4 °C)-98.4 °F (36.9 °C)] 98.4 °F (36.9 °C)  Pulse:  [61-82] 74  Resp:  [12-19] 17  SpO2:  [94 %-100 %] 94 %  BP: (103-137)/(51-79) 125/66                                Closed/Suction Drain 10/16/24 1340 Right;Posterior Back Accordion 10 Fr. (Active)   Site Description Unable to view 10/17/24 0745   Dressing Type Central line dressing 10/17/24 0745   Dressing Status Dry;Clean;Intact 10/17/24 0745   Dressing Intervention Integrity maintained 10/17/24 0745   Drainage Bloody 10/17/24 0745   Status Other (Comment) 10/17/24 0745   Output (mL) 125 mL 10/17/24 0441          Physical Exam         Neurosurgery Physical Exam    General: well developed, well nourished, no distress.   Head: normocephalic, atraumatic  Neurologic: drowsy 2/2 opioids. Thought content appropriate.  GCS: Motor: 6/Verbal: 5/Eyes: 4 GCS Total: 15  Mental Status: drowsy, Oriented x 4  Language: No aphasia  Speech: No dysarthria  Cranial nerves: face symmetric, tongue midline, CN II-XII grossly intact, EOMI. Pupils pinpoint.   Pulmonary: normal respirations, no signs of respiratory distress  Sensory: intact to light touch throughout  Motor Strength: Moves all extremities spontaneously with good tone. No abnormal movements seen.     Strength  Deltoids Triceps Biceps Wrist Extension Wrist Flexion Hand    Upper: R 5/5 5/5 5/5 5/5 5/5 5/5    L 5/5 5/5 5/5 5/5 5/5 5/5     Iliopsoas Quadriceps Knee  Flexion Tibialis  anterior Gastro- cnemius EHL   Lower: R 4-/5 5/5 5/5 4+/5 5/5 4+/5    L 5/5 5/5  "5/5 5/5 5/5 5/5     Skin: Skin is warm, dry and intact.  No drift or dysmetria.  Incisional WV in place, good seal.         Significant Labs:  Recent Labs   Lab 10/17/24  1318   GLU 97   *   K 3.9      CO2 23   BUN 11   CREATININE 0.8   CALCIUM 8.9     Recent Labs   Lab 10/17/24  1318   WBC 17.68*   HGB 11.5*   HCT 35.1*        No results for input(s): "LABPT", "INR", "APTT" in the last 48 hours.  Microbiology Results (last 7 days)       ** No results found for the last 168 hours. **          Recent Lab Results         10/17/24  1318        Anion Gap 9       Baso # 0.03       Basophil % 0.2       BUN 11       Calcium 8.9       Chloride 103       CO2 23       Creatinine 0.8       Differential Method Automated       eGFR >60.0       Eos # 0.0       Eos % 0.1       Glucose 97       Gran # (ANC) 13.6       Gran % 77.0       Hematocrit 35.1       Hemoglobin 11.5       Immature Grans (Abs) 0.09  Comment: Mild elevation in immature granulocytes is non specific and   can be seen in a variety of conditions including stress response,   acute inflammation, trauma and pregnancy. Correlation with other   laboratory and clinical findings is essential.         Immature Granulocytes 0.5       Lymph # 2.4       Lymph % 13.5       MCH 29.8       MCHC 32.8       MCV 91       Mono # 1.5       Mono % 8.7       MPV 9.6       nRBC 0       Platelet Count 381       Potassium 3.9       RBC 3.86       RDW 13.5       Sodium 135       WBC 17.68             All pertinent labs from the last 24 hours have been reviewed.    Significant Diagnostics:  I have reviewed all pertinent imaging results/findings within the past 24 hours.  Assessment/Plan:     * Fusion of spine, lumbar region  Ina Blair is a 53 y.o. female with history of hydrocephalus status post shunt, Codman Hakim set at 100, prior L4-5 TLIF, and most recently PCF C3-6 with Dr. Meek on 3/20/24. Now s/p revision and extension PSF L3-5 with Dr. Meek on " 10/16.    - Neurologically stable  - Pain control: decreased 2/2 opioid overdose. Received narcan 10/18.  - WV to POD5  - Drains: HV to gravity off suction. Abx while drain in place.  - Post op imaging showing satisfactory placement of hardware.   - Brace: LSO when OOB  - Continue PT/OT/OOB. OOB > 6hrs/day  - GI: Diet as tolerated. Continue bowel regimen.   - Voiding spontaneously s/p burnett removal, PVR 0 on 10/17. Bladder scan prn.  - DVT ppx: LVX, Compression stockings, SCDs  - Atelectasis ppx: IS at bedside. Encourage use every hour while awake.  - Discussed with Dr. Meek    - Dispo: Pending drain removal    Primary hypertension  Patients blood pressure range in the last 24 hours was: BP  Min: 98/57  Max: 141/69.The patient's inpatient anti-hypertensive regimen is listed below:  Current Antihypertensives  losartan-hydrochlorothiazide 50-12.5 mg per tablet 1 tablet, Daily, Oral  hydrALAZINE injection 10 mg, Every 6 hours PRN, Intravenous    Plan  - BP is controlled, no changes needed to their regimen        Jossy Tracy PA-C  Neurosurgery  [facility]

## 2024-10-18 NOTE — SUBJECTIVE & OBJECTIVE
Interval History: NAEON. AFVSS. Voiding. Labs pending. Narcan given this morning after pt became less responsive and hypoxic after dilaudid IV given. Drowsy but back to neuro baseline after. See rapid response note for details. Continue HV to gravity off suction. Likely discharge tomorrow.    Medications:  Continuous Infusions:  Scheduled Meds:   acetaminophen  650 mg Oral Q6H    ceFAZolin (Ancef) IV (PEDS and ADULTS)  2 g Intravenous Q8H    DULoxetine  60 mg Oral Daily    enoxparin  40 mg Subcutaneous Q24H (prophylaxis, 1700)    famotidine  20 mg Oral BID    gabapentin  400 mg Oral TID    losartan-hydrochlorothiazide 50-12.5 mg  1 tablet Oral Daily    mupirocin   Nasal BID    naloxone  0.4 mg Intravenous Once    polyethylene glycol  17 g Oral Daily    senna-docusate 8.6-50 mg  2 tablet Oral Daily    topiramate  25 mg Oral Daily     PRN Meds:  Current Facility-Administered Medications:     ALPRAZolam, 0.5 mg, Oral, BID PRN    aluminum-magnesium hydroxide-simethicone, 30 mL, Oral, Q4H PRN    bisacodyL, 10 mg, Rectal, Daily PRN    calcium carbonate, 500 mg, Oral, TID PRN    dextrose 10%, 12.5 g, Intravenous, PRN    dextrose 10%, 25 g, Intravenous, PRN    hydrALAZINE, 10 mg, Intravenous, Q6H PRN    ondansetron, 8 mg, Oral, Q6H PRN    oxyCODONE, 5 mg, Oral, Q6H PRN    prochlorperazine, 5 mg, Intravenous, Q6H PRN    tiZANidine, 4 mg, Oral, Q6H PRN     Review of Systems  Objective:     Weight: 99.8 kg (220 lb 0.3 oz)  Body mass index is 37.77 kg/m².  Vital Signs (Most Recent):  Temp: 98.4 °F (36.9 °C) (10/18/24 1112)  Pulse: 74 (10/18/24 1112)  Resp: 17 (10/18/24 1112)  BP: 125/66 (10/18/24 1112)  SpO2: (!) 94 % (10/18/24 1112) Vital Signs (24h Range):  Temp:  [97.6 °F (36.4 °C)-98.4 °F (36.9 °C)] 98.4 °F (36.9 °C)  Pulse:  [61-82] 74  Resp:  [12-19] 17  SpO2:  [94 %-100 %] 94 %  BP: (103-137)/(51-79) 125/66                                Closed/Suction Drain 10/16/24 1340 Right;Posterior Back Accordion 10 Fr. (Active)  "  Site Description Unable to view 10/17/24 0745   Dressing Type Central line dressing 10/17/24 0745   Dressing Status Dry;Clean;Intact 10/17/24 0745   Dressing Intervention Integrity maintained 10/17/24 0745   Drainage Bloody 10/17/24 0745   Status Other (Comment) 10/17/24 0745   Output (mL) 125 mL 10/17/24 0441          Physical Exam         Neurosurgery Physical Exam    General: well developed, well nourished, no distress.   Head: normocephalic, atraumatic  Neurologic: drowsy 2/2 opioids. Thought content appropriate.  GCS: Motor: 6/Verbal: 5/Eyes: 4 GCS Total: 15  Mental Status: drowsy, Oriented x 4  Language: No aphasia  Speech: No dysarthria  Cranial nerves: face symmetric, tongue midline, CN II-XII grossly intact, EOMI. Pupils pinpoint.   Pulmonary: normal respirations, no signs of respiratory distress  Sensory: intact to light touch throughout  Motor Strength: Moves all extremities spontaneously with good tone. No abnormal movements seen.     Strength  Deltoids Triceps Biceps Wrist Extension Wrist Flexion Hand    Upper: R 5/5 5/5 5/5 5/5 5/5 5/5    L 5/5 5/5 5/5 5/5 5/5 5/5     Iliopsoas Quadriceps Knee  Flexion Tibialis  anterior Gastro- cnemius EHL   Lower: R 4-/5 5/5 5/5 4+/5 5/5 4+/5    L 5/5 5/5 5/5 5/5 5/5 5/5     Skin: Skin is warm, dry and intact.  No drift or dysmetria.  Incisional WV in place, good seal.         Significant Labs:  Recent Labs   Lab 10/17/24  1318   GLU 97   *   K 3.9      CO2 23   BUN 11   CREATININE 0.8   CALCIUM 8.9     Recent Labs   Lab 10/17/24  1318   WBC 17.68*   HGB 11.5*   HCT 35.1*        No results for input(s): "LABPT", "INR", "APTT" in the last 48 hours.  Microbiology Results (last 7 days)       ** No results found for the last 168 hours. **          Recent Lab Results         10/17/24  1318        Anion Gap 9       Baso # 0.03       Basophil % 0.2       BUN 11       Calcium 8.9       Chloride 103       CO2 23       Creatinine 0.8       Differential " Method Automated       eGFR >60.0       Eos # 0.0       Eos % 0.1       Glucose 97       Gran # (ANC) 13.6       Gran % 77.0       Hematocrit 35.1       Hemoglobin 11.5       Immature Grans (Abs) 0.09  Comment: Mild elevation in immature granulocytes is non specific and   can be seen in a variety of conditions including stress response,   acute inflammation, trauma and pregnancy. Correlation with other   laboratory and clinical findings is essential.         Immature Granulocytes 0.5       Lymph # 2.4       Lymph % 13.5       MCH 29.8       MCHC 32.8       MCV 91       Mono # 1.5       Mono % 8.7       MPV 9.6       nRBC 0       Platelet Count 381       Potassium 3.9       RBC 3.86       RDW 13.5       Sodium 135       WBC 17.68             All pertinent labs from the last 24 hours have been reviewed.    Significant Diagnostics:  I have reviewed all pertinent imaging results/findings within the past 24 hours.

## 2024-10-18 NOTE — TELEPHONE ENCOUNTER
"CHAD patient- rounded on patient.  Family member at [facility]. Patient sitting up in chair with oxygen, drain, prevena wound vac with LSO brace on. Patient had rapid response called on her earlier due to respiratory depression. Patient states her pain is "6". Educated patient on pain control,. Patient to dc to [facility] when medically stable with post op clear to travel on tuesday then to fly home on wednesday.  Instructed patient to take discharge medications as directed and do NOT take more pain medicine then prescribed. Patient verbalized understanding.     Future Appointments   Date Time Provider Department Center   10/22/2024 11:30 AM Ansatacia Ritchie, NP [facility] NEUROS8 [facility]     Ashley Araiza, RN, CMSRN  RN Navigator  [facility]   [phone]  Fax [phone]        "

## 2024-10-18 NOTE — PT/OT/SLP PROGRESS
"Physical Therapy Treatment    Patient Name:  Ina Blair   MRN:  [id number]    Recommendations:     Discharge Recommendations: Low Intensity Therapy  Discharge Equipment Recommendations: bath bench  Barriers to discharge: Inaccessible home 2 ADEN with no rail    Assessment:     Ina Blair is a 53 y.o. female admitted with a medical diagnosis of Fusion of spine, lumbar region.  She presents with the following impairments/functional limitations: weakness, impaired endurance, gait instability, impaired functional mobility, decreased safety awareness . Pt is unsafe with functional mobility at this time due to pt requires CGA for bed mobility, CGA for transfers, and minimal assist for gait due to pt unable to avoid obstacles and would move quickly with impaired safety awareness. PT attempted to have pt perform mobility x 3 times with pt being unsteady and lethargic. Pt fell asleep in sitting on the sofa and had difficulty following verbal commands for mobility. Nurse called to the room to assess pt and was present prior to PT leaving the pt's room. Pt is motivated to progress with functional mobility.     Rehab Prognosis: Fair; patient would benefit from acute skilled PT services to address these deficits and reach maximum level of function.    Recent Surgery: Procedure(s) (LRB):  FUSION, SPINE (N/A) 2 Days Post-Op    Plan:     During this hospitalization, patient to be seen 4 x/week to address the identified rehab impairments via gait training, therapeutic activities, therapeutic exercises and progress toward the following goals:    Plan of Care Expires:  11/16/24    Subjective   "I can walk, I think I would feel better if I walked"    Pain/Comfort:  Pain Rating 1: 0/10 (pt did not c/o pain during treatment)  Pain Rating Post-Intervention 1: 0/10      Objective:     Communicated with nurse prior to session.  Patient found HOB elevated with hemovac, wound vac upon PT entry to room.     General " Precautions: Standard, fall  Orthopedic Precautions: spinal precautions  Braces: LSO (LSO when OOB)  Respiratory Status: Room air     Functional Mobility:  Bed Mobility:     Supine to Sit: contact guard assistance  Sit to Supine: contact guard assistance  Transfers:     Sit to Stand:  contact guard assistance with no AD  Gait: 5ft then 3 steps then 5ft with no AD with minimal assist. Pt rested in sitting on the sofa between gait attempts. Pt with LOB and instability throughout gait trials. Pt unable to avoid the end of the bed and was unaware of the wires attached to her. Pt fell asleep x 2 times between gait attempts.PT asked pt to return to the bed and pt requested to walk each time. On third attempt , PT assisted pt back to bed to keep pt safe and nurse was called to the room.     AM-PAC 6 CLICK MOBILITY  Turning over in bed (including adjusting bedclothes, sheets and blankets)?: 3  Sitting down on and standing up from a chair with arms (e.g., wheelchair, bedside commode, etc.): 3  Moving from lying on back to sitting on the side of the bed?: 3  Moving to and from a bed to a chair (including a wheelchair)?: 3  Need to walk in hospital room?: 3  Climbing 3-5 steps with a railing?: 1  Basic Mobility Total Score: 16     Patient left HOB elevated with all lines intact, call button in reach, nurse notified, and nurse present..    GOALS:   Multidisciplinary Problems       Physical Therapy Goals          Problem: Physical Therapy    Goal Priority Disciplines Outcome Interventions   Physical Therapy Goal     PT, PT/OT Progressing    Description: PT goals until 10/24/24    1. Pt supine to sit with mod independent-not met  2. Pt sit to supine with mod independent-not met  3. Pt sit to stand with no AD with supervision-not met  4. Pt to perform gait 200ft with no AD with supervision.-not met  5. Pt to up/down 2 steps with no rail with HHA with CGA.-not met                       Time Tracking:     PT Received On:  10/18/24  PT Start Time: 0921     PT Stop Time: 0944  PT Total Time (min): 23 min     Billable Minutes: Gait Training 13 and Therapeutic Activity 10    Treatment Type: Treatment  PT/PTA: PT           10/18/2024

## 2024-10-18 NOTE — ASSESSMENT & PLAN NOTE
Ina Blair is a 53 y.o. female with history of hydrocephalus status post shunt, Codman Hakim set at 100, prior L4-5 TLIF, and most recently PCF C3-6 with Dr. Meek on 3/20/24. Now s/p revision and extension PSF L3-5 with Dr. Meek on 10/16.    - Neurologically stable  - Pain control: decreased 2/2 opioid overdose. Received narcan 10/18.  - WV to POD5  - Drains: HV to gravity off suction. Abx while drain in place.  - Post op imaging showing satisfactory placement of hardware.   - Brace: LSO when OOB  - Continue PT/OT/OOB. OOB > 6hrs/day  - GI: Diet as tolerated. Continue bowel regimen.   - Voiding spontaneously s/p burnett removal, PVR 0 on 10/17. Bladder scan prn.  - DVT ppx: LVX, Compression stockings, SCDs  - Atelectasis ppx: IS at bedside. Encourage use every hour while awake.  - Discussed with Dr. Meek    - Dispo: Pending drain removal

## 2024-10-18 NOTE — NURSING
"Received phone call from OT nando stating," patient pupils are pinpoint and she is confused . Unable to stand up . Geovanny RN notified . Patient was assess and received narcan . Notified jeanna and Jossy PRAJAPATI. Jeanna in room and assess patient .  "

## 2024-10-18 NOTE — CARE UPDATE
"RAPID RESPONSE NURSE PROACTIVE ROUNDING NOTE       Time of Visit: 0950    Admit Date: 10/16/2024  LOS: 2  Code Status: Full Code   Date of Visit: 10/18/2024  : 1970  Age: 53 y.o.  Sex: female  Race: White  Bed: 506/506 A:   MRN: [id number]  Was the patient discharged from an ICU this admission? No   Was the patient discharged from a PACU within last 24 hours? No   Did the patient receive conscious sedation/general anesthesia in last 24 hours? No  Was the patient in the ED within the past 24 hours? No  Was the patient on NIPPV within the past 24 hours? No   Attending Physician: Quentin Meek MD  Primary Service: Neurosurgery   Time spent at the bedside: < 15 min    SITUATION    Notified by charge RN via phone call.  Reason for alert: AMS, hypoxia  Called to evaluate the patient for Neuro.    BACKGROUND     Why is the patient in the hospital?: Fusion of spine, lumbar region    Patient has a past medical history of Enlarged thyroid, Headache, Hydrocephalus, Hypertension, Neuropathy, and Unspecified osteoarthritis, unspecified site.    Last Vitals:  Temp: 97.9 °F (36.6 °C) (10/18 0956)  Pulse: 69 (10/18 1053)  Resp: 19 (10/18 09)  BP: 137/79 (10/18 09)  SpO2: 95 % (10/18 1053)    24 Hours Vitals Range:  Temp:  [97.6 °F (36.4 °C)-98.1 °F (36.7 °C)]   Pulse:  [61-82]   Resp:  [12-19]   BP: (103-137)/(51-79)   SpO2:  [94 %-100 %]     Labs:  Recent Labs     10/16/24  1029 10/17/24  1318   WBC 7.69 17.68*   HGB 13.7 11.5*   HCT 41.1 35.1*    381       Recent Labs     10/15/24  1403 10/17/24  1318   NA  --  135*   K 3.7 3.9   CL  --  103   CO2  --  23   BUN  --  11   CREATININE  --  0.8   GLU  --  97        No results for input(s): "PH", "PCO2", "PO2", "HCO3", "POCSATURATED", "BE" in the last 72 hours.     ASSESSMENT     Called to bedside to evaluate patient for AMS and hypoxia. Per report, pt became altered and desaturated to the 70s on pulse ox after receiving IV dilauded. Narcan given prior to my " arrival. Pt currently AOX4, in no acute distress, currently saturating 100% on .5L NC, hr 82, rr 18 even and unlabored, bp 125/68. Staff instructed to monitor for re-sedation as narcan may wear off before narcotics do.    Physical Exam  HENT:      Mouth/Throat:      Mouth: Mucous membranes are moist.      Pharynx: Oropharynx is clear.   Eyes:      Pupils: Pupils are equal, round, and reactive to light.   Cardiovascular:      Rate and Rhythm: Normal rate.   Pulmonary:      Effort: Pulmonary effort is normal.   Abdominal:      General: Abdomen is flat.      Palpations: Abdomen is soft.   Skin:     General: Skin is warm and dry.      Capillary Refill: Capillary refill takes less than 2 seconds.   Neurological:      General: No focal deficit present.      Mental Status: She is alert and oriented to person, place, and time.      GCS: GCS eye subscore is 4. GCS verbal subscore is 5. GCS motor subscore is 6.         INTERVENTIONS    The patient was seen for Neurological problem. Staff concerns included decreased responsiveness. The following interventions were performed: naloxone given, narcotics adjusted by primary team.  Respiratory problem. Staff concerns included desaturation. The following interventions were performed: supplemental oxygen, continuous pulse ox monitoring continued, and continuous cardiac monitoring continued.    RECOMMENDATIONS    Monitor for resedation - AMS, slurred speech, decreased LOC, change in vitals; pt may require additional doses of narcan. Add tele and . Maintain IV access. Strict fall precautions    PROVIDER ESCALATION    Yes/No  Yes    Orders received and case discussed with  AVA Romo.    Disposition: Remain in room 506.    FOLLOW-UP    Charge Ken LUTZ  updated on plan of care. Instructed to call the Rapid Response Nurse, Quentin Kwong RN at [phone] for additional questions or concerns.

## 2024-10-18 NOTE — NURSING
CHARGE NURSE NOTE:  She is difficult to arouse after getting her 2 mg Dilaudid at 0801.  Her respirations are shallow and O 2 sat on room air is 77%  She is placed on 2 L O2 per nasal canula.  RR is notified, primary team is notified and ordered Narcan is given.   She quickly aroused after the Narcan.  Primary team is making adjustments to her pain meds with new orders placed.   At this time after above she is fully aaox4.

## 2024-10-19 VITALS
HEIGHT: 64 IN | HEART RATE: 70 BPM | RESPIRATION RATE: 17 BRPM | OXYGEN SATURATION: 96 % | SYSTOLIC BLOOD PRESSURE: 108 MMHG | BODY MASS INDEX: 37.56 KG/M2 | DIASTOLIC BLOOD PRESSURE: 58 MMHG | TEMPERATURE: 98 F | WEIGHT: 220 LBS

## 2024-10-19 PROCEDURE — 25000003 PHARM REV CODE 250

## 2024-10-19 PROCEDURE — 63600175 PHARM REV CODE 636 W HCPCS: Performed by: NEUROLOGICAL SURGERY

## 2024-10-19 PROCEDURE — 25000003 PHARM REV CODE 250: Performed by: PHYSICIAN ASSISTANT

## 2024-10-19 PROCEDURE — 97116 GAIT TRAINING THERAPY: CPT | Mod: CQ

## 2024-10-19 PROCEDURE — 97535 SELF CARE MNGMENT TRAINING: CPT

## 2024-10-19 PROCEDURE — 97530 THERAPEUTIC ACTIVITIES: CPT | Mod: CQ

## 2024-10-19 RX ORDER — AMOXICILLIN 250 MG
2 CAPSULE ORAL 2 TIMES DAILY
Status: DISCONTINUED | OUTPATIENT
Start: 2024-10-19 | End: 2024-10-19 | Stop reason: HOSPADM

## 2024-10-19 RX ORDER — POLYETHYLENE GLYCOL 3350 17 G/17G
17 POWDER, FOR SOLUTION ORAL 2 TIMES DAILY
Status: DISCONTINUED | OUTPATIENT
Start: 2024-10-19 | End: 2024-10-19 | Stop reason: HOSPADM

## 2024-10-19 RX ORDER — BISACODYL 10 MG/1
10 SUPPOSITORY RECTAL DAILY PRN
Status: DISCONTINUED | OUTPATIENT
Start: 2024-10-19 | End: 2024-10-19

## 2024-10-19 RX ADMIN — OXYCODONE 5 MG: 5 TABLET ORAL at 11:10

## 2024-10-19 RX ADMIN — BISACODYL 10 MG: 10 SUPPOSITORY RECTAL at 09:10

## 2024-10-19 RX ADMIN — CEFAZOLIN 2 G: 2 INJECTION, POWDER, FOR SOLUTION INTRAMUSCULAR; INTRAVENOUS at 05:10

## 2024-10-19 RX ADMIN — CEFAZOLIN 2 G: 2 INJECTION, POWDER, FOR SOLUTION INTRAMUSCULAR; INTRAVENOUS at 11:10

## 2024-10-19 RX ADMIN — ACETAMINOPHEN 650 MG: 325 TABLET ORAL at 11:10

## 2024-10-19 RX ADMIN — SENNOSIDES AND DOCUSATE SODIUM 2 TABLET: 50; 8.6 TABLET ORAL at 08:10

## 2024-10-19 RX ADMIN — OXYCODONE 5 MG: 5 TABLET ORAL at 05:10

## 2024-10-19 RX ADMIN — POLYETHYLENE GLYCOL 3350 17 G: 17 POWDER, FOR SOLUTION ORAL at 08:10

## 2024-10-19 RX ADMIN — ACETAMINOPHEN 650 MG: 325 TABLET ORAL at 05:10

## 2024-10-19 RX ADMIN — GABAPENTIN 400 MG: 400 CAPSULE ORAL at 08:10

## 2024-10-19 RX ADMIN — DULOXETINE HYDROCHLORIDE 60 MG: 60 CAPSULE, DELAYED RELEASE ORAL at 08:10

## 2024-10-19 RX ADMIN — TOPIRAMATE 25 MG: 25 TABLET, FILM COATED ORAL at 08:10

## 2024-10-19 RX ADMIN — TIZANIDINE 4 MG: 4 TABLET ORAL at 08:10

## 2024-10-19 RX ADMIN — FAMOTIDINE 20 MG: 20 TABLET ORAL at 08:10

## 2024-10-19 NOTE — PROGRESS NOTES
The sw met with the pt and her best friend Junie Johnson who was at bedside to discuss her d/c disposition. The pt inquired about the use of the rw that was in her room or a rw to go home with. The pt states she has a rw at home but is was given to her bu her boss. The sw sent a secure chat to the doctor and PT to notify them of this info.      1:05pm The sw was given permission by Matthias(charge nurse)to allow the pt to take the rw to the [facility] and return it at her f/u appt next week. The sw notified the pt and she states she will return it at her appt.

## 2024-10-19 NOTE — DISCHARGE SUMMARY
[facility] - [facility]  Neurosurgery  Discharge Summary      Patient Name: Ina Blair  MRN: [id number]  Admission Date: 10/16/2024  Hospital Length of Stay: 3 days  Discharge Date and Time:  10/19/2024 11:43 AM  Attending Physician: Quentin Meek MD   Discharging Provider: Ashley Murillo MD  Primary Care Provider: Kym, Primary Doctor    HPI:   Ina Blair is a 53 y.o.  female with history of hydrocephalus status post shunt, Codman Hakim set at 100, prior L4-5 TLIF, and most recently PCF C3-6 with Dr. Meek on 3/20/24. She is being seen in clinic today for her pre-operative appointment for her revision and extension PSF L3-5 with Dr. Meek. States that she continues to struggle with low back and R>L leg pain that radiates down the lateral and posterior aspect of the legs. Back = leg pain. She feels as though her right foot continues to drag. Rates her pain as a 3/10.     Procedure(s) (LRB):  FUSION, SPINE (N/A)     Hospital Course: 10/17: NAEON. AFVSS. Voiding spontaneously s/p burnett removal, PVR 0. Pain well controlled. Ambulating. C/o chest pain/burning this morning, EKG and trop wnl. Tums and famotidine ordered. Home pending drain removal.  10/18: NAEON. AFVSS. Voiding. Labs pending. Narcan given this morning after pt became less responsive and hypoxic after dilaudid IV given. Drowsy but back to neuro baseline after. See rapid response note for details. Continue HV to gravity off suction. Likely discharge tomorrow.  10/19: Patient improved this AM, did well with physical therapy ambulated 140 feet. Drain and provena removed, stable for discharge. Strength in right foot moderately improved from preop, persistent slight EHL weakness.     Goals of Care Treatment Preferences:  Code Status: Full Code      Consults:     Significant Diagnostic Studies: N/A    Pending Diagnostic Studies:       None          Final Active Diagnoses:    Diagnosis Date Noted POA    PRINCIPAL PROBLEM:  Fusion of spine,  lumbar region [M43.26] 07/25/2020 Yes    Primary hypertension [I10] 10/26/2023 Yes      Problems Resolved During this Admission:      Discharged Condition: stable     Disposition: Home or Self Care    Follow Up: on Tuesday 10/22 with Anastacia and Dr. Meek     Patient Instructions:   Post op Spine Patient Instructions     Activity Restrictions:  [x]  Return to work will be determined on an individual basis.  [x]  No lifting greater than 5-10 pounds.  [x]  Avoid bending and twisting the area of your surgery more than 45 degrees from neutral position in any direction.  [x]  No driving or operating machinery:  [x]  until cleared by your surgeon.  [x]  while taking narcotic pain medications or muscle relaxants.  [x]  Wear your brace at all times except when lying in bed or showering.   [x]  Increase ambulation over the next 2 weeks. Walk on paved surfaces only. It is okay to walk up and down stairs while holding onto a side rail.  [x]  No sexual activity for 2 weeks.     Discharge Medication/Follow-up:  [x]  Please refer to discharge medication reconciliation form.  [x]  Take Tylenol (acetaminophen) 650 mg every 6 hours as needed for additional pain control.  [x]  Do not take ANY Aspirin or Aspirin containing products for 2 weeks after surgery (unless otherwise directed in discharge medication list).   [x]  Do not take ANY herbal supplements for 2 weeks after surgery.    [x]  Do not take ANY non-steroidal anti-inflammatory drugs (NSAIDS), including the following: ibuprofen, naprosyn, Aleve, Advil, Indocin, Mobic, or Celebrex for12 weeks after surgery.   [x]  Prescriptions for appropriate medication will be given upon discharge.  [x]  Take the pain medication as prescribed. We recommend to wean use of your pain medication after 1 week of taking as prescribed (Ex: After 1 week of taking every 4 hours as needed, wean down to taking your pain medication every 6 hours as needed).  [x]  Take docusate (Colace 100 mg): take one  capsule a day as needed for constipation. You can get this over the counter.  [x]  Follow-up appointment:  [x]  10-14 days post-op for wound check by physician assistant/nurse  [x]  4-6 weeks with MD:  [x]  with x-rays  [x]  An appointment will be mailed to you.     Wound Care:  [x]  Remove the small dressing near your incision in 5 days.   [x]  No bandage required. Keep your incision open to the air.   [x]  You may shower on the 2nd day after your surgery. Do not allow the force of water to hit the incision. Ok to allow water to rinse over the incision. Pat the incision dry if it becomes wet. Do not rub or scrub the incision.  [x]  You cannot take a bath until 8 weeks after surgery.  [x]  Apply bacitracin to incision twice a day for 2 weeks.     Call your doctor or go to the Emergency Room for any signs of infection, including: increased redness, drainage, pain, or fever (temperature >=101). Call your doctor or go to the Emergency Room if there are any localized neurological changes; problems with speech, vision, numbness, tingling, weakness, or severe headache; inability to control urination or bowel movements; inability to urinate; or for other concerns.        Special Instructions:  [x]  No use of tobacco products.  [x]  Diet: Please eat a regular diet as tolerated.        Physicians need 3 days' notice for pain medicine to be refilled. Pain medicine will only be refilled between 8 AM and 5 PM, Monday through Friday, due to Food and Drug Administration regulation of documentation.     If you have any questions about this form, please call [phone].         Medications:  Reconciled Home Medications:      Medication List        START taking these medications      famotidine 20 MG tablet  Commonly known as: PEPCID  Take 1 tablet (20 mg total) by mouth 2 (two) times daily. for 14 days     naloxone 4 mg/actuation Spry  Commonly known as: NARCAN  1 spray (4 mg total) by Nasal route once. for 1 dose     ondansetron  8 MG Tbdl  Commonly known as: ZOFRAN-ODT  Dissolve 1 tablet (8 mg total) by mouth every 6 (six) hours as needed (nausea).     polyethylene glycol 17 gram Pwpk  Commonly known as: GLYCOLAX  Take 17 g by mouth once daily.     senna-docusate 8.6-50 mg 8.6-50 mg per tablet  Commonly known as: PERICOLACE  Take 2 tablets by mouth once daily.            CHANGE how you take these medications      oxyCODONE-acetaminophen  mg per tablet  Commonly known as: PERCOCET  Take 1 tablet by mouth every 4 (four) hours as needed for Pain.  What changed:   when to take this  reasons to take this     tiZANidine 4 MG tablet  Commonly known as: ZANAFLEX  Take 1 tablet (4 mg total) by mouth every 6 (six) hours as needed (muscle spasms).  What changed: reasons to take this            CONTINUE taking these medications      acetaminophen 500 MG tablet  Commonly known as: TYLENOL  Take 1,000 mg by mouth every 8 (eight) hours as needed for Pain.     ALPRAZolam 0.5 MG tablet  Commonly known as: XANAX  Take 0.5 mg by mouth 2 (two) times daily as needed.     amLODIPine 2.5 MG tablet  Commonly known as: NORVASC  TAKE ONE TABLET BY MOUTH DAILY - MAY TAKE 2 TABLETS IF BLOOD PRESSURE IS OVER 160     DULoxetine 60 MG capsule  Commonly known as: CYMBALTA  Take 60 mg by mouth.     gabapentin 400 MG capsule  Commonly known as: NEURONTIN  Take 400 mg by mouth 3 (three) times daily.     losartan-hydrochlorothiazide 50-12.5 mg 50-12.5 mg per tablet  Commonly known as: HYZAAR  Take 1 tablet by mouth once daily.     topiramate 25 MG tablet  Commonly known as: TOPAMAX  Take 25 mg by mouth.            STOP taking these medications      diazePAM 2 MG tablet  Commonly known as: VALIUM     traMADoL 50 mg tablet  Commonly known as: CARO Murillo MD  Neurosurgery  [facility] - Surgery

## 2024-10-19 NOTE — NURSING
Patient discharged to [facility] with [facility] walker for assistance . Friend carrier walker to [facility].

## 2024-10-19 NOTE — PT/OT/SLP PROGRESS
Occupational Therapy   Treatment    Name: Ina Blair  MRN: [id number]  Admitting Diagnosis:  Fusion of spine, lumbar region  3 Days Post-Op    Recommendations:     Discharge Recommendations: Low Intensity Therapy  Discharge Equipment Recommendations:  bath bench  Barriers to discharge:  None    Assessment:     Ina Blair is a 53 y.o. female with a medical diagnosis of Fusion of spine, lumbar region.  She presents with spinal precautions. Performance deficits affecting function are weakness, impaired endurance, impaired self care skills, impaired functional mobility, gait instability, impaired balance, orthopedic precautions. Pt is improving with mobility and transfers while adhering to precautions. Pt indp is limited by precautions therefore pt will need assistance and or AD.    Rehab Prognosis:  Good; patient would benefit from acute skilled OT services to address these deficits and reach maximum level of function.       Plan:     Patient to be seen 4 x/week to address the above listed problems via self-care/home management, therapeutic activities, therapeutic exercises, neuromuscular re-education  Plan of Care Expires: 11/16/24  Plan of Care Reviewed with: patient, family    Subjective     Chief Complaint: None  Patient/Family Comments/goals: return home  Pain/Comfort:  Pain Rating 1: 0/10    Objective:     Communicated with: Nsg prior to session.  Patient found  in bathroom toileting  with hemovac, telemetry, pulse ox (continuous) upon OT entry to room; dtr present in room    General Precautions: Standard, fall    Orthopedic Precautions:spinal precautions  Braces: LSO  Respiratory Status: Room air     Occupational Performance:     Bed Mobility:    Pt OOB     Functional Mobility/Transfers:   Patient completed Sit <> Stand Transfer with supervision  with  no assistive device   Functional Mobility: Ambulated with no AD from bathroom to chair c/ SBA from dtr    Activities of Daily  Living:  Feeding:  supervision eating lunch  Toileting: minimum assistance boyd-care  Pt educated and trained on hip kit items to use for mod -I with ADLs      [facility] 6 Click ADL: 21    Treatment & Education:  Pt educated on role and purpose of therapy  Pt educated on goal setting  Pt educated on benefits of OOB activity  Pt educated on self advocacy   Pt educated on LSO and spinal precautions    Patient left up in chair with all lines intact, call button in reach, nsg notified, and case mgmt present    GOALS:   Multidisciplinary Problems       Occupational Therapy Goals          Problem: Occupational Therapy    Goal Priority Disciplines Outcome Interventions   Occupational Therapy Goal     OT, PT/OT Progressing    Description: Goals to be met by: 10/31/24     Patient will increase functional independence with ADLs by performing:    UE Dressing with Modified [place].  LE Dressing with Modified [place].  Grooming while standing at sink with Modified [place].  Toileting from toilet/bedside commode with Modified [place] for hygiene and clothing management.   Toilet transfer to toilet/bedside commode with Modified [place].                         Time Tracking:     OT Date of Treatment: 10/19/24  OT Start Time: 1202  OT Stop Time: 1210  OT Total Time (min): 8 min + 10 min to return for hip kit training (12:20-12:30); 18 total min    Billable Minutes:Self Care/Home Management 18    OT/SETH: OT          10/19/2024

## 2024-10-19 NOTE — PROGRESS NOTES
The sw met with the pt and her best friend Junie Johnson who was at bedside to discuss her d/c plan. Junie will assist the pt with getting to the [facility] next door and assist her as needed. The sw stressed the importance of the pt going to her hsp f/u's and taking her medications. The pt acknowledged understanding and states she will. The pt has no further Case Management needs or questions and is clear to d/c.     Future Appointments   Date Time Provider Department Center   10/22/2024 11:30 AM Anastacia Ritchie NP [facility] NEUROS8 [facility] - Surgery  Discharge Final Note    Primary Care Provider: No, Primary Doctor    Expected Discharge Date: 10/19/2024    Final Discharge Note (most recent)       Final Note - 10/17/24 1207          Final Note    Assessment Type Discharge Planning Assessment                     Important Message from Medicare             Contact Info       Anastacia Ritchie NP   Specialty: Neurosurgery    [address]  [facility] [address]   Phone: [phone]       Next Steps: Follow up on 10/22/2024    Instructions: 11:30am [facility] post op clear to travel

## 2024-10-21 ENCOUNTER — TELEPHONE (OUTPATIENT)
Dept: NEUROSURGERY | Facility: CLINIC | Age: 54
End: 2024-10-21
Payer: COMMERCIAL

## 2024-10-21 RX ORDER — OXYCODONE HYDROCHLORIDE 5 MG/1
5 TABLET ORAL EVERY 12 HOURS PRN
Qty: 10 TABLET | Refills: 0 | Status: SHIPPED | OUTPATIENT
Start: 2024-10-21 | End: 2024-10-27

## 2024-10-21 RX ORDER — NALOXONE HYDROCHLORIDE 4 MG/.1ML
SPRAY NASAL
Qty: 2 EACH | Refills: 11 | Status: SHIPPED | OUTPATIENT
Start: 2024-10-21

## 2024-10-21 NOTE — TELEPHONE ENCOUNTER
"Called patient to check on her at [facility]. Patient states "im doing well, ready to go home. " Pain under control, patient states she is having "breakthrough pain". Patient on percocet 10/325mg PO Q4, gabapentin, zanaflex. Patient has staples and sutures to surgical incision, no issues reported by patient. Patient ambulating with borrowed rolling walker, passing gas. No BM yet, patient taking mirlax. Instructed patient to make 2 week followup with PCP to remove staples and sutures, will need lumbar pa/lat xray at 6 weeks post op and lumbar CT at 12 weeks post op. Will fax alll clinical instructions to PCP after patient discharges to home. All questions answered, patient verbalizes understanding.    Future Appointments   Date Time Provider Department Center   10/22/2024 11:30 AM Anastacia Ritchie, NP [facility] NEUROS8 [place] Aminata Araiza, BOLIVAR, CMSRN  RN Navigator  [facility]   [phone]  Fax [phone]        "

## 2024-10-21 NOTE — OP NOTE
DATE OF SURGERY: 10/16/24     PREOPERATIVE DIAGNOSIS:  1. Adjacent segment degenerative disc disease at L3-4 above an L4-5 fusion with severe central and foraminal stenosis  2. Class II obesity     POSTOPERATIVE DIAGNOSIS:  Same.     PROCEDURE PERFORMED:  1. Revision and extension of posterior spinal fusion, L3 to L5   2. Removal and reinsertion of posterior spinal hardware, L4-5  3. Posterior segmental spinal fixation, L3 to L5 (DePuy Synthes)  4. Posterior lumbar interbody fusion, L3-4  5.  Application of titanium interbody spacer, L3-4 (DePuy Synthes)  6.  L3-4 laminectomy and bilateral foraminotomy for severe stenosis  7.  Use of intraoperative fluoroscopy  8.  Use of intraoperative neuro-monitoring with triggered EMG  9. Infuse and local bone grafting  10. 10cm complex wound revision with advancement of paraspinal muscle flap     SURGEON: Quentin Meek M.D.     ASSISTANT: Ashley Murillo M.D.     ANESTHESIA: GETA     ESTIMATED BLOOD LOSS: 300mL     COMPLICATIONS: None     DRAINS: Two deep Hemovacs and a Prevena incisional woundvac     SPECIMENS SENT: None     FINDINGS: None     INDICATIONS:     53F with L4-5 fusion presents with debilitating axial low back pain and radicular leg pain that failed conservative measures. Imaging revealed the above diagnosis. I recommended the above procedure. R/B/A/I/M were reviewed in detail and the patient wished to proceed. All questions were answered and no guarantees were made.  .  PROCEDURE:     The patient was brought into the operating room where she was intubated and placed under general anesthesia without difficulty.  All lines were placed. She was repositioned prone onto the operating room table with appropriate padding all pressure points. The region of interest was localized with AP and lateral x-ray.  The skin was marked and the area was prepped and draped in the usual sterile fashion.  A timeout was performed prior to procedure.  10 mL's of lidocaine with  epinephrine were injected in the skin.     A midline linear incision was made with a 10 blade from approximately L3 to L5 incorporating the previous midline scar.  Supra and subfascial midline dissection was carried out with Bovie electrocautery.  Subperiosteal dissection was carried out with Bovie electrocautery and Crowder elevators to expose the posterior elements from L3 to L5, and the prior hardware at L4-5. The L4-5 hardware was removed and replaced with larger diameter pedicle screws. The underlying fusion was explored and found to be solid.  Medial facetectomies were performed bilaterally at L3-4 with the osteotome.       Bilateral L3 pedicle screws were placed freehand and confirmed to be in good position by xray and triggered EMG.    Attention was turned to performance of a posterior lumbar interbody fusion at L3-4 from a right-sided approach. First an L3-4 laminectomy for central stenosis and bilateral L3-4 foraminotomies for foraminal stenosis was performed in standard fashion to decompress the thecal sac and nerve roots at the L3-4 interspace. THIS DECOMPRESSION WAS MORE THAN WHAT WAS REQUIRED FOR AN INTERBODY FUSION. It was performed in standard fashion with the high speed drill and rongeurs. Adequate decompression of the thecal sac and nerve roots were confirmed with Dahlgren palpation.  Next,  a nerve root retractor was used to retract the thecal sac medially and expose the L3-4 disc space.  Epidural veins were cauterized and divided.  An annulotomy was performed with a 15 blade.  A pituitary rongeur was used to remove disc material.  The endplates were prepared with disc kojo, rasps, and curettes. A titanium cage was countersunk into the L3-4 disc space and had snug fit. The L3-4 disc space was pre and post packed with local bone for interbody arthrodesis. Xrays showed good cage position.     Bilateral titanium rods were sized, contoured and reduced into the tulip heads.  Set screws were tightened.  Final xrays showed excellent position of all hardware.  The wound was copiously irrigated with sterile normal saline and a dilute Betadine solution. Exposed bony surfaces from L3 to L5 were decorticated bilaterally with a high-speed drill.  Infuse and local bone were placed posteriorly for arthrodesis from L3 to L5. Two deep Hemovacs placed. A 10cm complex wound revision was performed by identifying lateral feeders and elevating bilateral myofascial flaps with the bovie to obliterate the fascial defect at the midline. A watertight fascial closure was achieved with interrupted 0 Vicryl sutures and a running Stratafix suture.  The soft tissues were closed in layers. Staples and vertical mattress nylon sutures were placed at the skin and a Prevena dressing was applied.     The patient appeared to tolerate the procedure well from a hemodynamic and neuromonitoring standpoint. I was present for all critical portions of the case, and at the end of the case all counts were correct. The patient was repositioned supine onto the hospital bed where she was extubated and allowed to emerge from anesthesia. She was sent to the PACU in stable condition for recovery.    JUSTIFICATION OF MODIFIER 22: This was a complex spinal fusion revision surgery in a patient with Class II obesity and dense epidural scar tissue. Inherent risks were elevated. The case required significantly increased preop planning, mental effort, technical skill and time to perform it safely.

## 2024-10-22 ENCOUNTER — OFFICE VISIT (OUTPATIENT)
Dept: NEUROSURGERY | Facility: CLINIC | Age: 54
End: 2024-10-22
Payer: COMMERCIAL

## 2024-10-22 VITALS
BODY MASS INDEX: 39.54 KG/M2 | WEIGHT: 230.38 LBS | TEMPERATURE: 98 F | DIASTOLIC BLOOD PRESSURE: 77 MMHG | SYSTOLIC BLOOD PRESSURE: 115 MMHG

## 2024-10-22 DIAGNOSIS — M51.369 DEGENERATION OF INTERVERTEBRAL DISC OF LUMBAR REGION, UNSPECIFIED WHETHER PAIN PRESENT: Primary | ICD-10-CM

## 2024-10-22 DIAGNOSIS — Z98.1 S/P SPINAL FUSION: ICD-10-CM

## 2024-10-22 PROCEDURE — 99999 PR PBB SHADOW E&M-EST. PATIENT-LVL III: CPT | Mod: PBBFAC,COE,, | Performed by: NURSE PRACTITIONER

## 2024-10-22 PROCEDURE — 99024 POSTOP FOLLOW-UP VISIT: CPT | Mod: S$GLB,COE,, | Performed by: NURSE PRACTITIONER

## 2024-10-22 NOTE — PROGRESS NOTES
"  Neurosurgery  Established Patient    SUBJECTIVE:     History of Present Illness: Ina Blair is a 53 y.o. female with history of hydrocephalus status post shunt, Codman Hakim set at 100, prior L4-5 TLIF, PCF C3-6 with Dr. Meek on 3/20/24, and most recently revision and extension of posterior spinal fusion, L3 to L5. She is being seen in clinic today with her daughter for her clearance to travel. States that she is doing ok since surgery. She has noticed improvement in her "shocking" leg pain. Reports incisional pain and back pain. Denies fever or chills or new neurological deficits. States that her right foot weakness has not improved yet. She has not had a BM since surgery but is passing gas.     Review of patient's allergies indicates:   Allergen Reactions    Adhesive     Adhesive tape-silicones        Current Outpatient Medications   Medication Sig Dispense Refill    acetaminophen (TYLENOL) 500 MG tablet Take 1,000 mg by mouth every 8 (eight) hours as needed for Pain.      ALPRAZolam (XANAX) 0.5 MG tablet Take 0.5 mg by mouth 2 (two) times daily as needed.      amLODIPine (NORVASC) 2.5 MG tablet TAKE ONE TABLET BY MOUTH DAILY - MAY TAKE 2 TABLETS IF BLOOD PRESSURE IS OVER 160      DULoxetine (CYMBALTA) 60 MG capsule Take 60 mg by mouth.      famotidine (PEPCID) 20 MG tablet Take 1 tablet (20 mg total) by mouth 2 (two) times daily. for 14 days 28 tablet 0    gabapentin (NEURONTIN) 400 MG capsule Take 400 mg by mouth 3 (three) times daily.      losartan-hydrochlorothiazide 50-12.5 mg (HYZAAR) 50-12.5 mg per tablet Take 1 tablet by mouth once daily.      naloxone (NARCAN) 4 mg/actuation Spry Spray 1 spray (4mg) by nasal route as needed for opioid overdose; may repeat every 2-3 minutes in alternating nostrils until medical help arrives. Call 911 2 each 11    ondansetron (ZOFRAN-ODT) 8 MG TbDL Dissolve 1 tablet (8 mg total) by mouth every 6 (six) hours as needed (nausea). 20 tablet 0    oxyCODONE " (ROXICODONE) 5 MG immediate release tablet Take 1 tablet (5 mg total) by mouth every 12 (twelve) hours as needed for Pain (breakthrough pain 8-10). 10 tablet 0    oxyCODONE-acetaminophen (PERCOCET)  mg per tablet Take 1 tablet by mouth every 4 (four) hours as needed for Pain. 84 tablet 0    polyethylene glycol (GLYCOLAX) 17 gram PwPk Take 17 g by mouth once daily.      senna-docusate 8.6-50 mg (PERICOLACE) 8.6-50 mg per tablet Take 2 tablets by mouth once daily.      tiZANidine (ZANAFLEX) 4 MG tablet Take 1 tablet (4 mg total) by mouth every 6 (six) hours as needed (muscle spasms). 56 tablet 0    topiramate (TOPAMAX) 25 MG tablet Take 25 mg by mouth.       No current facility-administered medications for this visit.       Past Medical History:   Diagnosis Date    Enlarged thyroid 2/20/2024    Headache     Hydrocephalus     Hypertension     Neuropathy     Unspecified osteoarthritis, unspecified site      Past Surgical History:   Procedure Laterality Date    APPENDECTOMY      bladder stretch      CARPAL TUNNEL RELEASE Bilateral     DECOMPRESSION OF CERVICAL SPINE BY POSTERIOR APPROACH N/A 3/20/2024    Procedure: DECOMPRESSION, SPINE, CERVICAL, POSTERIOR APPROACH;  Surgeon: Quentin Meek MD;  Location: [facility] OR [address] [place] [address];  Service: Neurosurgery;  Laterality: N/A;  [facility] patient  C3-6 PCF  Anesthesia: General  NM: EMg, SEP, MNEP  Rad: C-arm  Brace: [facility]  [facility]  Bed position: head to core  [facility]    LAMINECTOMY AND MICRODISCECTOMY LUMBAR SPINE  2021    LUMBAR FUSION  2008    SPINAL FUSION N/A 10/16/2024    Procedure: FUSION, SPINE;  Surgeon: Quentin Meek MD;  Location: [facility] OR [address] [place] [address];  Service: Neurosurgery;  Laterality: N/A;  Revision of Fusion L3-L5    TOTAL HIP ARTHROPLASTY Left 10/31/2023    TOTAL KNEE ARTHROPLASTY Bilateral     VENTRICULOPERITONEAL SHUNT       Family History       Problem Relation (Age of Onset)    Cancer Mother, Brother    Heart disease Mother     Hypertension Mother          Social History     Socioeconomic History    Marital status:      Spouse name: Tony    Number of children: 2   Tobacco Use    Smoking status: Former     Types: Cigarettes    Tobacco comments:     Smoked cigarettes 33 years 1/2 pack per day Stopped January 2024   Substance and Sexual Activity    Alcohol use: Yes     Comment: 2 drinks per week    Drug use: Not Currently     Types: Marijuana     Comment: uses for sleep     Social Drivers of Health     Financial Resource Strain: Low Risk  (10/17/2024)    Overall Financial Resource Strain (CARDIA)     Difficulty of Paying Living Expenses: Not hard at all   Food Insecurity: No Food Insecurity (10/17/2024)    Hunger Vital Sign     Worried About Running Out of Food in the Last Year: Never true     Ran Out of Food in the Last Year: Never true   Transportation Needs: No Transportation Needs (10/17/2024)    TRANSPORTATION NEEDS     Transportation : No   Physical Activity: Inactive (10/17/2024)    Exercise Vital Sign     Days of Exercise per Week: 0 days     Minutes of Exercise per Session: 0 min   Stress: No Stress Concern Present (10/17/2024)    [employer] - Occupational Stress Questionnaire     Feeling of Stress : Not at all   Housing Stability: Low Risk  (10/17/2024)    Housing Stability Vital Sign     Unable to Pay for Housing in the Last Year: No     Homeless in the Last Year: No       Review of Systems    OBJECTIVE:     Vital Signs  Temp: 97.9 °F (36.6 °C)  Pulse: (P) 69  BP: 115/77  Pain Score:   2  Weight: 104.5 kg (230 lb 6.1 oz)  Body mass index is 39.54 kg/m².    Neurosurgery Physical Exam  General: well developed, well nourished, no distress.   Head: normocephalic, atraumatic  Neurologic: Alert and oriented. Thought content appropriate.  GCS: Motor: 6/Verbal: 5/Eyes: 4 GCS Total: 15  Mental Status: Awake, Alert, Oriented x 4  Language: No aphasia  Speech: No dysarthria  Cranial nerves: face symmetric,  tongue midline, CN II-XII grossly intact.   Eyes: pupils equal, round, reactive to light with accomodation, EOMI.   Pulmonary: normal respirations, no signs of respiratory distress  Abdomen: soft, non-distended  Skin: Skin is warm, dry and intact. Incision C/D/I no redness or swelling or drainage noted  Sensory: intact to light touch throughout  Motor Strength:Moves all extremities spontaneously with good tone.             ASSESSMENT/PLAN:     Ina Blair is a 53 y.o. female with history of hydrocephalus status post shunt, Codman Hakim set at 100, prior L4-5 TLIF, PCF C3-6 with Dr. Meek on 3/20/24, and most recently revision and extension of posterior spinal fusion, L3 to L5. She was instructed:    Activity Restrictions:  [x]  Return to work will be determined on an individual basis.  [x]  No lifting greater than 5-10 pounds.  [x]  Avoid bending and twisting the area of your surgery more than 45 degrees from neutral position in any direction.  [x]  No driving or operating machinery:  [x]  until cleared by your surgeon.  [x]  while taking narcotic pain medications or muscle relaxants.  [x]  Wear your brace at all times except when lying in bed, showering, eating, using the restroom, or performing hygiene tasks.   [x]  Increase ambulation over the next 2 weeks. Walk on paved surfaces only. It is okay to walk up and down stairs while holding onto a side rail.  [x]  No sexual activity for 2 weeks.     Discharge Medication/Follow-up:  [x]  Please refer to discharge medication reconciliation form.  [x]  Take Tylenol (acetaminophen) 650 mg every 6 hours as needed for additional pain control.  [x]  Do not take ANY Aspirin or Aspirin containing products for 2 weeks after surgery (unless otherwise directed in discharge medication list).   [x]  Do not take ANY herbal supplements for 2 weeks after surgery.    [x]  Do not take ANY non-steroidal anti-inflammatory drugs (NSAIDS), including the following: ibuprofen,  naprosyn, Aleve, Advil, Indocin, Mobic, or Celebrex for 12 weeks after surgery.   [x]  Prescriptions for appropriate medication will be given upon discharge.  [x]  Take the pain medication as prescribed. We recommend to wean use of your pain medication after 1 week of taking as prescribed (Ex: After 1 week of taking every 4 hours as needed, wean down to taking your pain medication every 6 hours as needed).  [x]  Take docusate (Colace 100 mg): take one capsule a day as needed for constipation. You can get this over the counter.     Follow Up Appointments: *It is your responsibility to schedule these appointments*  At 2 weeks: -Wound check with your primary care provider (you have staples an nylon sutures in place). The wound should be well healed at 2 weeks and staples/sutures can be removed.  At 6 weeks: -Lumbar AP and lateral x-rays and appointment with your primary car provider  At 3 months: -Lumbar CT to evaluate for fusion with your primary care provider        Wound Care:  [x]  Remove the small dressings near your incision in 2 days.   [x]  No bandage required. Keep your incision open to the air.   [x]  You may shower on the 2nd day after your surgery. Keep the incision clean and dry at all times. Do not allow the force of water to hit the incision. If the incision gets damp, pat it dry. Do not rub or scrub the incision.  [x]  You cannot take a bath until 8 weeks after surgery.      Call your doctor or go to the Emergency Room for any signs of infection, including: increased redness, drainage, pain, or fever (temperature >=101). Call your doctor or go to the Emergency Room if there are any localized neurological changes; problems with speech, vision, numbness, tingling, weakness, or severe headache; inability to control urination or bowel movements; inability to urinate; or for other concerns.     Special Instructions:  [x]  No use of tobacco products.  [x]  Diet: Please eat a regular diet as tolerated.      HERACLIO Larson  Neurosurgery  [facility].      Note dictated with voice recognition software, please excuse any grammatical errors.

## 2024-10-24 ENCOUNTER — TELEPHONE (OUTPATIENT)
Dept: NEUROSURGERY | Facility: CLINIC | Age: 54
End: 2024-10-24
Payer: COMMERCIAL

## 2024-10-24 NOTE — TELEPHONE ENCOUNTER
faxed all clinical notes and dc summary to [facility] and PCP to followup in 2 weeks for staple/suture removal and and order 6 week post op lumbar xrays, and 12 week post op CT. all appointments linked and bundle closed.    Ashley Araiza, RN, CMSRN  RN Navigator  [facility]   [phone]  Fax [phone]

## 2024-12-02 ENCOUNTER — PATIENT MESSAGE (OUTPATIENT)
Dept: INTERNAL MEDICINE | Facility: CLINIC | Age: 54
End: 2024-12-02
Payer: COMMERCIAL

## 2024-12-02 ENCOUNTER — PATIENT MESSAGE (OUTPATIENT)
Dept: ADMINISTRATIVE | Facility: OTHER | Age: 54
End: 2024-12-02
Payer: COMMERCIAL

## 2024-12-03 ENCOUNTER — TELEPHONE (OUTPATIENT)
Dept: SPINE | Facility: CLINIC | Age: 54
End: 2024-12-03
Payer: COMMERCIAL

## 2024-12-03 NOTE — TELEPHONE ENCOUNTER
Spoke with pt and advised that per VICTORINA Larson, she will need to wear the brace for 3 months. Pt verbalized understanding.

## 2024-12-03 NOTE — TELEPHONE ENCOUNTER
Spoke with pt on 12-3-24. She advised that she is doing very well and that she has done her 6 week xray at [facility] in [facility]. I requested this imaging through [facility] and will have surgeon review as soon as it is received. Pt asked how long she will need to wear her brace. I advised that I will find out from [facility] and give her a call to let her know.

## 2024-12-04 ENCOUNTER — PATIENT MESSAGE (OUTPATIENT)
Dept: ADMINISTRATIVE | Facility: OTHER | Age: 54
End: 2024-12-04
Payer: COMMERCIAL

## 2024-12-19 ENCOUNTER — PATIENT MESSAGE (OUTPATIENT)
Dept: ADMINISTRATIVE | Facility: OTHER | Age: 54
End: 2024-12-19
Payer: COMMERCIAL

## 2025-01-23 ENCOUNTER — TELEPHONE (OUTPATIENT)
Dept: ORTHOPEDICS | Facility: CLINIC | Age: 55
End: 2025-01-23
Payer: COMMERCIAL

## 2025-01-23 NOTE — TELEPHONE ENCOUNTER
Left message for pt advising that Dr. Meek has reviewed her 6 week post-op xray and it looks good. I also advised that her 3 month C scan is now due. I asked that if they have not set that up for her as of yet, that she please call her pcp and have them get that scheduled for her. I asked that she call me once it has been done so we can request a copy of it.

## 2025-01-27 ENCOUNTER — TELEPHONE (OUTPATIENT)
Dept: ORTHOPEDICS | Facility: CLINIC | Age: 55
End: 2025-01-27
Payer: COMMERCIAL

## 2025-01-27 NOTE — TELEPHONE ENCOUNTER
Spoke with pt and she advised that her ct was done and her pcp office said they are mailing to us. If not received in few days, I will call pcp office to f/u.

## 2025-02-06 ENCOUNTER — TELEPHONE (OUTPATIENT)
Dept: ORTHOPEDICS | Facility: CLINIC | Age: 55
End: 2025-02-06
Payer: COMMERCIAL

## 2025-02-06 NOTE — TELEPHONE ENCOUNTER
Left message for pt advising that Dr. Meek has reviewed her CT scan and he said it looks great. I advised that she is now released from restrictions.

## 2025-02-06 NOTE — TELEPHONE ENCOUNTER
Spoke with pt and she advised that she has been having some neck pain for the last month or so that is radiating some into her arms. We advised that she try some physical therapy as she has not yet done any. Pt advised that she will look into that and let us know how it goes.

## 2025-05-08 ENCOUNTER — TELEPHONE (OUTPATIENT)
Dept: NEUROSURGERY | Facility: CLINIC | Age: 55
End: 2025-05-08
Payer: COMMERCIAL

## 2025-05-08 NOTE — TELEPHONE ENCOUNTER
Spoke with patient regarding PHQ Questionnaires. Questionnaires completed during call. All questions answered to patient satisfaction.       Spoke with patient regarding importance of PHQ Questionnaire completion which are set to  on ***. Questionnaires sent to personal email and via text link to number on file      Left message for patient regarding importance of PHQ Questionnaire completion which are set to  on ***. Questionnaires sent to personal email and via text link to number on file.      Email and text sent to contact information on file to complete PHQ Questionnaires that are set to  on ***.      Resent PHQ questionnaires to personal email and text. Spoke with patient on ***. Will follow up next week to view for completion.      Unable to contact patient number on file is not in service at this time. PHQ Questionnaires expiring on *** sent to email on file.

## (undated) DEVICE — SUT ETHILON 2-0 PSLX 30IN

## (undated) DEVICE — DRESSING MEPILEX BORDR AG 4X12

## (undated) DEVICE — TRAY NEURO OMC

## (undated) DEVICE — KIT EVACUATOR 3-SPRING 1/8 DRN

## (undated) DEVICE — CLIP MED TICALL

## (undated) DEVICE — SPONGE COTTON TRAY 4X4IN

## (undated) DEVICE — KIT PREVENA PLUS

## (undated) DEVICE — COVER BACK TBL HD 2-TIER 72IN

## (undated) DEVICE — DRAPE THREE-QTR REINF 53X77IN

## (undated) DEVICE — BUR BONE CUT MICRO TPS 3X3.8MM

## (undated) DEVICE — SUT CTD VICRYL 2-0 CR/CT-2

## (undated) DEVICE — DRAPE ABDOMINAL TIBURON 14X11

## (undated) DEVICE — NDL SPINAL 18GX3.5 SPINOCAN

## (undated) DEVICE — Device

## (undated) DEVICE — TAP SHORT SURGICAL NS 3.5MM

## (undated) DEVICE — TAP EXPEDIUM 5.5X7MM 30-70MM

## (undated) DEVICE — DRAPE C-ARMOR EQUIPMENT COVER

## (undated) DEVICE — SUT VICRYL+ 1 CT1 18IN

## (undated) DEVICE — DRESSING AQUACEL FOAM 5 X 5

## (undated) DEVICE — CANISTER INFOV.A.C WOUND 500ML

## (undated) DEVICE — DRAPE C-ARM ELAS CLIP 42X120IN

## (undated) DEVICE — DRAPE STERI-DRAPE 1000 17X11IN

## (undated) DEVICE — DRESSING MEPILEX BORDER 4 X 4

## (undated) DEVICE — KIT SPINAL PATIENT CARE JACK

## (undated) DEVICE — SPHERE MARKER REFLECTIVE DISP

## (undated) DEVICE — DRAPE TOP 53X102IN

## (undated) DEVICE — SPONGE GAUZE 16PLY 4X4

## (undated) DEVICE — SUT SILK 3-0 BLK BR SH 30IN

## (undated) DEVICE — TUBE FRAZIER 5MM 2FT SOFT TIP

## (undated) DEVICE — BLADE MILL+ BONE MEDIUM DISP

## (undated) DEVICE — MARKER SKIN RULER STERILE

## (undated) DEVICE — DRESSING AQUACEL SACRAL 9 X 9

## (undated) DEVICE — KIT SURGIFLO HEMOSTATIC MATRIX

## (undated) DEVICE — SPONGE LAP 4X18 PREWASHED

## (undated) DEVICE — DRESSING AQUACEL FOAM 4 X 12

## (undated) DEVICE — DRAPE STERI INSTRUMENT 1018

## (undated) DEVICE — SEE MEDLINE ITEM 157148

## (undated) DEVICE — BLADE 4IN EDGE INSULATED

## (undated) DEVICE — SYR IRRIGATION BULB STER 60ML

## (undated) DEVICE — CHLORAPREP 10.5 ML APPLICATOR

## (undated) DEVICE — TIP YANKAUERS BULB NO VENT

## (undated) DEVICE — SUT VICRYL PLUS 0 CT1 18IN

## (undated) DEVICE — SPONGE NEURO 1/4X1/4

## (undated) DEVICE — SEALER AQUAMANTYS 2.3 BIPOLAR

## (undated) DEVICE — DRESSING AQUACEL FOAM 3 X 3

## (undated) DEVICE — CORD BIPOLAR 12 FOOT

## (undated) DEVICE — ROUTER TAPERED 2.3MM

## (undated) DEVICE — SUT VICRYL 2 0 CT 2

## (undated) DEVICE — DRESSING AQUACEL AG 3.5X10IN

## (undated) DEVICE — TRAY CATH 1-LYR URIMTR 16FR

## (undated) DEVICE — SUT STRATAFIX PDS PLUS VIO

## (undated) DEVICE — BURR RND FLUT SFT TOUCH 2.0MM

## (undated) DEVICE — ELECTRODE REM PLYHSV RETURN 9

## (undated) DEVICE — APPLICATOR CHLORAPREP ORN 26ML

## (undated) DEVICE — PENCIL ROCKER SWITCH 10FT CORD

## (undated) DEVICE — TAP 6MM SPINAL POWER

## (undated) DEVICE — BLADE CLIPPER NEURO / MDL 4411